# Patient Record
Sex: FEMALE | Race: WHITE | NOT HISPANIC OR LATINO | URBAN - METROPOLITAN AREA
[De-identification: names, ages, dates, MRNs, and addresses within clinical notes are randomized per-mention and may not be internally consistent; named-entity substitution may affect disease eponyms.]

---

## 2017-01-06 ENCOUNTER — OUTPATIENT (OUTPATIENT)
Dept: OUTPATIENT SERVICES | Facility: HOSPITAL | Age: 77
LOS: 1 days | Discharge: HOME | End: 2017-01-06

## 2017-01-06 DIAGNOSIS — I25.10 ATHEROSCLEROTIC HEART DISEASE OF NATIVE CORONARY ARTERY WITHOUT ANGINA PECTORIS: ICD-10-CM

## 2017-01-06 DIAGNOSIS — E03.9 HYPOTHYROIDISM, UNSPECIFIED: ICD-10-CM

## 2017-01-06 DIAGNOSIS — Z79.84 LONG TERM (CURRENT) USE OF ORAL HYPOGLYCEMIC DRUGS: ICD-10-CM

## 2017-01-06 DIAGNOSIS — E11.9 TYPE 2 DIABETES MELLITUS WITHOUT COMPLICATIONS: ICD-10-CM

## 2017-01-06 DIAGNOSIS — Z01.818 ENCOUNTER FOR OTHER PREPROCEDURAL EXAMINATION: ICD-10-CM

## 2017-01-13 ENCOUNTER — OUTPATIENT (OUTPATIENT)
Dept: OUTPATIENT SERVICES | Facility: HOSPITAL | Age: 77
LOS: 1 days | Discharge: HOME | End: 2017-01-13

## 2017-01-21 DIAGNOSIS — I25.10 ATHEROSCLEROTIC HEART DISEASE OF NATIVE CORONARY ARTERY WITHOUT ANGINA PECTORIS: ICD-10-CM

## 2017-01-21 DIAGNOSIS — Z79.84 LONG TERM (CURRENT) USE OF ORAL HYPOGLYCEMIC DRUGS: ICD-10-CM

## 2017-01-21 DIAGNOSIS — E11.9 TYPE 2 DIABETES MELLITUS WITHOUT COMPLICATIONS: ICD-10-CM

## 2017-01-21 DIAGNOSIS — R94.39 ABNORMAL RESULT OF OTHER CARDIOVASCULAR FUNCTION STUDY: ICD-10-CM

## 2017-01-21 DIAGNOSIS — E03.9 HYPOTHYROIDISM, UNSPECIFIED: ICD-10-CM

## 2017-07-03 ENCOUNTER — OUTPATIENT (OUTPATIENT)
Dept: OUTPATIENT SERVICES | Facility: HOSPITAL | Age: 77
LOS: 1 days | Discharge: HOME | End: 2017-07-03

## 2017-07-03 VITALS
DIASTOLIC BLOOD PRESSURE: 77 MMHG | HEIGHT: 62 IN | TEMPERATURE: 97 F | RESPIRATION RATE: 18 BRPM | OXYGEN SATURATION: 100 % | WEIGHT: 147.27 LBS | SYSTOLIC BLOOD PRESSURE: 137 MMHG | HEART RATE: 73 BPM

## 2017-07-03 DIAGNOSIS — Z98.890 OTHER SPECIFIED POSTPROCEDURAL STATES: Chronic | ICD-10-CM

## 2017-07-03 DIAGNOSIS — M48.06 SPINAL STENOSIS, LUMBAR REGION: ICD-10-CM

## 2017-07-03 DIAGNOSIS — M48.05 SPINAL STENOSIS, THORACOLUMBAR REGION: ICD-10-CM

## 2017-07-03 DIAGNOSIS — M40.15 OTHER SECONDARY KYPHOSIS, THORACOLUMBAR REGION: ICD-10-CM

## 2017-07-03 DIAGNOSIS — M43.16 SPONDYLOLISTHESIS, LUMBAR REGION: ICD-10-CM

## 2017-07-03 DIAGNOSIS — E03.9 HYPOTHYROIDISM, UNSPECIFIED: ICD-10-CM

## 2017-07-03 DIAGNOSIS — Z95.828 PRESENCE OF OTHER VASCULAR IMPLANTS AND GRAFTS: Chronic | ICD-10-CM

## 2017-07-03 DIAGNOSIS — E87.1 HYPO-OSMOLALITY AND HYPONATREMIA: ICD-10-CM

## 2017-07-03 DIAGNOSIS — Z96.659 PRESENCE OF UNSPECIFIED ARTIFICIAL KNEE JOINT: Chronic | ICD-10-CM

## 2017-07-03 DIAGNOSIS — M96.3 POSTLAMINECTOMY KYPHOSIS: ICD-10-CM

## 2017-07-03 DIAGNOSIS — D64.9 ANEMIA, UNSPECIFIED: ICD-10-CM

## 2017-07-03 DIAGNOSIS — Z01.818 ENCOUNTER FOR OTHER PREPROCEDURAL EXAMINATION: ICD-10-CM

## 2017-07-03 DIAGNOSIS — B96.5 PSEUDOMONAS (AERUGINOSA) (MALLEI) (PSEUDOMALLEI) AS THE CAUSE OF DISEASES CLASSIFIED ELSEWHERE: ICD-10-CM

## 2017-07-03 DIAGNOSIS — E11.9 TYPE 2 DIABETES MELLITUS WITHOUT COMPLICATIONS: ICD-10-CM

## 2017-07-03 LAB
ANION GAP SERPL CALC-SCNC: 7 MMOL/L — SIGNIFICANT CHANGE UP (ref 5–17)
APPEARANCE UR: CLEAR — SIGNIFICANT CHANGE UP
APTT BLD: 33 SEC — SIGNIFICANT CHANGE UP (ref 27.5–37.4)
BACTERIA # UR AUTO: (no result)
BASOPHILS # BLD AUTO: 0.1 K/UL — SIGNIFICANT CHANGE UP (ref 0–0.2)
BASOPHILS NFR BLD AUTO: 0.8 % — SIGNIFICANT CHANGE UP (ref 0–2)
BILIRUB UR-MCNC: NEGATIVE — SIGNIFICANT CHANGE UP
BLD GP AB SCN SERPL QL: SIGNIFICANT CHANGE UP
BUN SERPL-MCNC: 28 MG/DL — HIGH (ref 7–23)
CALCIUM SERPL-MCNC: 8.8 MG/DL — SIGNIFICANT CHANGE UP (ref 8.5–10.1)
CHLORIDE SERPL-SCNC: 106 MMOL/L — SIGNIFICANT CHANGE UP (ref 96–108)
CO2 SERPL-SCNC: 27 MMOL/L — SIGNIFICANT CHANGE UP (ref 22–31)
COLOR SPEC: YELLOW — SIGNIFICANT CHANGE UP
CREAT SERPL-MCNC: 0.94 MG/DL — SIGNIFICANT CHANGE UP (ref 0.5–1.3)
DIFF PNL FLD: NEGATIVE — SIGNIFICANT CHANGE UP
EOSINOPHIL # BLD AUTO: 0.1 K/UL — SIGNIFICANT CHANGE UP (ref 0–0.5)
EOSINOPHIL NFR BLD AUTO: 0.8 % — SIGNIFICANT CHANGE UP (ref 0–6)
EPI CELLS # UR: SIGNIFICANT CHANGE UP
GLUCOSE SERPL-MCNC: 107 MG/DL — HIGH (ref 70–99)
GLUCOSE UR QL: NEGATIVE MG/DL — SIGNIFICANT CHANGE UP
HCT VFR BLD CALC: 47.3 % — HIGH (ref 34.5–45)
HGB BLD-MCNC: 15.2 G/DL — SIGNIFICANT CHANGE UP (ref 11.5–15.5)
INR BLD: 1.02 RATIO — SIGNIFICANT CHANGE UP (ref 0.88–1.16)
KETONES UR-MCNC: NEGATIVE — SIGNIFICANT CHANGE UP
LEUKOCYTE ESTERASE UR-ACNC: NEGATIVE — SIGNIFICANT CHANGE UP
LYMPHOCYTES # BLD AUTO: 1.1 K/UL — SIGNIFICANT CHANGE UP (ref 1–3.3)
LYMPHOCYTES # BLD AUTO: 14.4 % — SIGNIFICANT CHANGE UP (ref 13–44)
MCHC RBC-ENTMCNC: 31.1 PG — SIGNIFICANT CHANGE UP (ref 27–34)
MCHC RBC-ENTMCNC: 32.2 GM/DL — SIGNIFICANT CHANGE UP (ref 32–36)
MCV RBC AUTO: 96.6 FL — SIGNIFICANT CHANGE UP (ref 80–100)
MONOCYTES # BLD AUTO: 0.2 K/UL — SIGNIFICANT CHANGE UP (ref 0–0.9)
MONOCYTES NFR BLD AUTO: 2.4 % — SIGNIFICANT CHANGE UP (ref 2–14)
MRSA PCR RESULT.: SIGNIFICANT CHANGE UP
NEUTROPHILS # BLD AUTO: 6 K/UL — SIGNIFICANT CHANGE UP (ref 1.8–7.4)
NEUTROPHILS NFR BLD AUTO: 81.6 % — HIGH (ref 43–77)
NITRITE UR-MCNC: NEGATIVE — SIGNIFICANT CHANGE UP
PH UR: 7 — SIGNIFICANT CHANGE UP (ref 5–8)
PLATELET # BLD AUTO: 414 K/UL — HIGH (ref 150–400)
POTASSIUM SERPL-MCNC: 4.4 MMOL/L — SIGNIFICANT CHANGE UP (ref 3.5–5.3)
POTASSIUM SERPL-SCNC: 4.4 MMOL/L — SIGNIFICANT CHANGE UP (ref 3.5–5.3)
PROT UR-MCNC: 15 MG/DL
PROTHROM AB SERPL-ACNC: 11 SEC — SIGNIFICANT CHANGE UP (ref 9.8–12.7)
RBC # BLD: 4.89 M/UL — SIGNIFICANT CHANGE UP (ref 3.8–5.2)
RBC # FLD: 12.8 % — SIGNIFICANT CHANGE UP (ref 10.3–14.5)
RBC CASTS # UR COMP ASSIST: SIGNIFICANT CHANGE UP /HPF (ref 0–4)
S AUREUS DNA NOSE QL NAA+PROBE: SIGNIFICANT CHANGE UP
SODIUM SERPL-SCNC: 140 MMOL/L — SIGNIFICANT CHANGE UP (ref 135–145)
SP GR SPEC: 1.01 — SIGNIFICANT CHANGE UP (ref 1.01–1.02)
TYPE + AB SCN PNL BLD: SIGNIFICANT CHANGE UP
UROBILINOGEN FLD QL: NEGATIVE MG/DL — SIGNIFICANT CHANGE UP
WBC # BLD: 7.3 K/UL — SIGNIFICANT CHANGE UP (ref 3.8–10.5)
WBC # FLD AUTO: 7.3 K/UL — SIGNIFICANT CHANGE UP (ref 3.8–10.5)
WBC UR QL: SIGNIFICANT CHANGE UP

## 2017-07-03 NOTE — H&P PST ADULT - HISTORY OF PRESENT ILLNESS
This is a 78 y/o female with PMH of hypothyroidism, diabetes, constipation, chronic lower back pain who is scheduled for a revision of laminectomy. She denies any injury ot her back reports pain is all due to arthritis. Pain at its worst is 10/10 and she takes Tylenol and prednisone relief. She also uses a TEMS machine. This is a 78 y/o female with PMH of hypothyroidism, diabetes, constipation, chronic lower back pain who is scheduled for a revision of laminectomy. She denies any injury ot her back and reports pain is all due to arthritis. Pain at its worst is 10/10 and she takes Tylenol and Prednisone for relief. She also uses a TEMS machine for pain relief and a rolling walker for ambulation This is a 78 y/o female with PMH of hypothyroidism, diabetes, constipation, chronic lower back pain who is scheduled for a revision of laminectomy. She denies any injury to her back and reports pain is all due to arthritis. Pain at its worst is 10/10 and she takes Tylenol and Prednisone for relief. She also uses a TEMS machine for pain relief and a rolling walker for ambulation

## 2017-07-03 NOTE — H&P PST ADULT - ASSESSMENT
This is a 76 y/o female who is scheduled for a laminectomy due to chronic back pain      Patient instructed on     1. NPO post midnight of surgery  2. On the use of EZ sponges  3. Mupirocin use  4. Aware that she needs medical clearance  5. May take synthroid with a sip of water on day of procedure

## 2017-07-03 NOTE — H&P PST ADULT - PMH
Cataract    Chronic Back Pain    Constipation    Edema    Hypothyroidism    Lumbar back pain    Osteoarthritis    Spastic colon Cataract    Chronic Back Pain    Constipation    Edema    Hypothyroidism    Lumbar back pain    MRSA colonization    Osteoarthritis    Spastic colon Cataract    Chronic Back Pain    Constipation    Diabetes    Edema    Hypothyroidism    Lumbar back pain    MRSA colonization    Osteoarthritis    Spastic colon

## 2017-07-11 ENCOUNTER — INPATIENT (INPATIENT)
Facility: HOSPITAL | Age: 77
LOS: 4 days | Discharge: HOME | End: 2017-07-16
Attending: ORTHOPAEDIC SURGERY | Admitting: ORTHOPAEDIC SURGERY
Payer: MEDICARE

## 2017-07-11 VITALS
RESPIRATION RATE: 16 BRPM | DIASTOLIC BLOOD PRESSURE: 87 MMHG | OXYGEN SATURATION: 100 % | HEIGHT: 62 IN | WEIGHT: 140.21 LBS | HEART RATE: 75 BPM | SYSTOLIC BLOOD PRESSURE: 145 MMHG | TEMPERATURE: 98 F

## 2017-07-11 DIAGNOSIS — E03.9 HYPOTHYROIDISM, UNSPECIFIED: ICD-10-CM

## 2017-07-11 DIAGNOSIS — Z98.890 OTHER SPECIFIED POSTPROCEDURAL STATES: Chronic | ICD-10-CM

## 2017-07-11 DIAGNOSIS — E11.9 TYPE 2 DIABETES MELLITUS WITHOUT COMPLICATIONS: ICD-10-CM

## 2017-07-11 DIAGNOSIS — Z95.828 PRESENCE OF OTHER VASCULAR IMPLANTS AND GRAFTS: Chronic | ICD-10-CM

## 2017-07-11 DIAGNOSIS — Z98.890 OTHER SPECIFIED POSTPROCEDURAL STATES: ICD-10-CM

## 2017-07-11 DIAGNOSIS — Z96.659 PRESENCE OF UNSPECIFIED ARTIFICIAL KNEE JOINT: Chronic | ICD-10-CM

## 2017-07-11 DIAGNOSIS — D72.829 ELEVATED WHITE BLOOD CELL COUNT, UNSPECIFIED: ICD-10-CM

## 2017-07-11 DIAGNOSIS — Z22.39 CARRIER OF OTHER SPECIFIED BACTERIAL DISEASES: ICD-10-CM

## 2017-07-11 LAB
ANION GAP SERPL CALC-SCNC: 8 MMOL/L — SIGNIFICANT CHANGE UP (ref 5–17)
BASOPHILS # BLD AUTO: 0 K/UL — SIGNIFICANT CHANGE UP (ref 0–0.2)
BASOPHILS NFR BLD AUTO: 0.3 % — SIGNIFICANT CHANGE UP (ref 0–2)
BUN SERPL-MCNC: 27 MG/DL — HIGH (ref 7–23)
CALCIUM SERPL-MCNC: 8.2 MG/DL — LOW (ref 8.5–10.1)
CHLORIDE SERPL-SCNC: 103 MMOL/L — SIGNIFICANT CHANGE UP (ref 96–108)
CO2 SERPL-SCNC: 28 MMOL/L — SIGNIFICANT CHANGE UP (ref 22–31)
CREAT SERPL-MCNC: 0.66 MG/DL — SIGNIFICANT CHANGE UP (ref 0.5–1.3)
EOSINOPHIL # BLD AUTO: 0 K/UL — SIGNIFICANT CHANGE UP (ref 0–0.5)
EOSINOPHIL NFR BLD AUTO: 0.1 % — SIGNIFICANT CHANGE UP (ref 0–6)
GLUCOSE SERPL-MCNC: 155 MG/DL — HIGH (ref 70–99)
HCT VFR BLD CALC: 37.6 % — SIGNIFICANT CHANGE UP (ref 34.5–45)
HGB BLD-MCNC: 12.5 G/DL — SIGNIFICANT CHANGE UP (ref 11.5–15.5)
LYMPHOCYTES # BLD AUTO: 1.1 K/UL — SIGNIFICANT CHANGE UP (ref 1–3.3)
LYMPHOCYTES # BLD AUTO: 6.5 % — LOW (ref 13–44)
MCHC RBC-ENTMCNC: 31.5 PG — SIGNIFICANT CHANGE UP (ref 27–34)
MCHC RBC-ENTMCNC: 33.4 GM/DL — SIGNIFICANT CHANGE UP (ref 32–36)
MCV RBC AUTO: 94.6 FL — SIGNIFICANT CHANGE UP (ref 80–100)
MONOCYTES # BLD AUTO: 0.9 K/UL — SIGNIFICANT CHANGE UP (ref 0–0.9)
MONOCYTES NFR BLD AUTO: 5.3 % — SIGNIFICANT CHANGE UP (ref 2–14)
NEUTROPHILS # BLD AUTO: 14.4 K/UL — HIGH (ref 1.8–7.4)
NEUTROPHILS NFR BLD AUTO: 87.8 % — HIGH (ref 43–77)
PLATELET # BLD AUTO: 346 K/UL — SIGNIFICANT CHANGE UP (ref 150–400)
POTASSIUM SERPL-MCNC: 3.3 MMOL/L — LOW (ref 3.5–5.3)
POTASSIUM SERPL-SCNC: 3.3 MMOL/L — LOW (ref 3.5–5.3)
RBC # BLD: 3.98 M/UL — SIGNIFICANT CHANGE UP (ref 3.8–5.2)
RBC # FLD: 12.4 % — SIGNIFICANT CHANGE UP (ref 10.3–14.5)
SODIUM SERPL-SCNC: 139 MMOL/L — SIGNIFICANT CHANGE UP (ref 135–145)
WBC # BLD: 16.4 K/UL — HIGH (ref 3.8–10.5)
WBC # FLD AUTO: 16.4 K/UL — HIGH (ref 3.8–10.5)

## 2017-07-11 RX ORDER — INSULIN LISPRO 100/ML
VIAL (ML) SUBCUTANEOUS
Qty: 0 | Refills: 0 | Status: DISCONTINUED | OUTPATIENT
Start: 2017-07-11 | End: 2017-07-16

## 2017-07-11 RX ORDER — MAGNESIUM HYDROXIDE 400 MG/1
30 TABLET, CHEWABLE ORAL EVERY 12 HOURS
Qty: 0 | Refills: 0 | Status: DISCONTINUED | OUTPATIENT
Start: 2017-07-11 | End: 2017-07-16

## 2017-07-11 RX ORDER — DOCUSATE SODIUM 100 MG
100 CAPSULE ORAL THREE TIMES A DAY
Qty: 0 | Refills: 0 | Status: DISCONTINUED | OUTPATIENT
Start: 2017-07-11 | End: 2017-07-16

## 2017-07-11 RX ORDER — CEFTAZIDIME 6 G/30ML
2 INJECTION, POWDER, FOR SOLUTION INTRAVENOUS ONCE
Qty: 0 | Refills: 0 | Status: COMPLETED | OUTPATIENT
Start: 2017-07-11 | End: 2017-07-11

## 2017-07-11 RX ORDER — DEXTROSE 50 % IN WATER 50 %
1 SYRINGE (ML) INTRAVENOUS ONCE
Qty: 0 | Refills: 0 | Status: DISCONTINUED | OUTPATIENT
Start: 2017-07-11 | End: 2017-07-16

## 2017-07-11 RX ORDER — DEXTROSE 50 % IN WATER 50 %
25 SYRINGE (ML) INTRAVENOUS ONCE
Qty: 0 | Refills: 0 | Status: DISCONTINUED | OUTPATIENT
Start: 2017-07-11 | End: 2017-07-16

## 2017-07-11 RX ORDER — NALOXONE HYDROCHLORIDE 4 MG/.1ML
0.1 SPRAY NASAL
Qty: 0 | Refills: 0 | Status: DISCONTINUED | OUTPATIENT
Start: 2017-07-11 | End: 2017-07-16

## 2017-07-11 RX ORDER — DEXTROSE 50 % IN WATER 50 %
12.5 SYRINGE (ML) INTRAVENOUS ONCE
Qty: 0 | Refills: 0 | Status: DISCONTINUED | OUTPATIENT
Start: 2017-07-11 | End: 2017-07-16

## 2017-07-11 RX ORDER — CEFEPIME 1 G/1
2000 INJECTION, POWDER, FOR SOLUTION INTRAMUSCULAR; INTRAVENOUS ONCE
Qty: 0 | Refills: 0 | Status: DISCONTINUED | OUTPATIENT
Start: 2017-07-11 | End: 2017-07-11

## 2017-07-11 RX ORDER — CEFEPIME 1 G/1
2000 INJECTION, POWDER, FOR SOLUTION INTRAMUSCULAR; INTRAVENOUS EVERY 8 HOURS
Qty: 0 | Refills: 0 | Status: DISCONTINUED | OUTPATIENT
Start: 2017-07-11 | End: 2017-07-11

## 2017-07-11 RX ORDER — CEFEPIME 1 G/1
2000 INJECTION, POWDER, FOR SOLUTION INTRAMUSCULAR; INTRAVENOUS EVERY 12 HOURS
Qty: 0 | Refills: 0 | Status: COMPLETED | OUTPATIENT
Start: 2017-07-11 | End: 2017-07-12

## 2017-07-11 RX ORDER — SENNA PLUS 8.6 MG/1
2 TABLET ORAL AT BEDTIME
Qty: 0 | Refills: 0 | Status: DISCONTINUED | OUTPATIENT
Start: 2017-07-11 | End: 2017-07-16

## 2017-07-11 RX ORDER — SODIUM CHLORIDE 9 MG/ML
1000 INJECTION, SOLUTION INTRAVENOUS
Qty: 0 | Refills: 0 | Status: DISCONTINUED | OUTPATIENT
Start: 2017-07-11 | End: 2017-07-11

## 2017-07-11 RX ORDER — SODIUM CHLORIDE 9 MG/ML
1000 INJECTION, SOLUTION INTRAVENOUS
Qty: 0 | Refills: 0 | Status: DISCONTINUED | OUTPATIENT
Start: 2017-07-11 | End: 2017-07-16

## 2017-07-11 RX ORDER — ONDANSETRON 8 MG/1
4 TABLET, FILM COATED ORAL EVERY 6 HOURS
Qty: 0 | Refills: 0 | Status: DISCONTINUED | OUTPATIENT
Start: 2017-07-11 | End: 2017-07-16

## 2017-07-11 RX ORDER — ACETAMINOPHEN 500 MG
650 TABLET ORAL EVERY 6 HOURS
Qty: 0 | Refills: 0 | Status: DISCONTINUED | OUTPATIENT
Start: 2017-07-11 | End: 2017-07-16

## 2017-07-11 RX ORDER — HYDROMORPHONE HYDROCHLORIDE 2 MG/ML
30 INJECTION INTRAMUSCULAR; INTRAVENOUS; SUBCUTANEOUS
Qty: 0 | Refills: 0 | Status: DISCONTINUED | OUTPATIENT
Start: 2017-07-11 | End: 2017-07-14

## 2017-07-11 RX ORDER — HYDROMORPHONE HYDROCHLORIDE 2 MG/ML
0.5 INJECTION INTRAMUSCULAR; INTRAVENOUS; SUBCUTANEOUS
Qty: 0 | Refills: 0 | Status: DISCONTINUED | OUTPATIENT
Start: 2017-07-11 | End: 2017-07-14

## 2017-07-11 RX ORDER — GLUCAGON INJECTION, SOLUTION 0.5 MG/.1ML
1 INJECTION, SOLUTION SUBCUTANEOUS ONCE
Qty: 0 | Refills: 0 | Status: DISCONTINUED | OUTPATIENT
Start: 2017-07-11 | End: 2017-07-16

## 2017-07-11 RX ORDER — LEVOTHYROXINE SODIUM 125 MCG
100 TABLET ORAL DAILY
Qty: 0 | Refills: 0 | Status: DISCONTINUED | OUTPATIENT
Start: 2017-07-11 | End: 2017-07-16

## 2017-07-11 RX ORDER — DEXTROSE MONOHYDRATE, SODIUM CHLORIDE, AND POTASSIUM CHLORIDE 50; .745; 4.5 G/1000ML; G/1000ML; G/1000ML
1000 INJECTION, SOLUTION INTRAVENOUS
Qty: 0 | Refills: 0 | Status: DISCONTINUED | OUTPATIENT
Start: 2017-07-11 | End: 2017-07-16

## 2017-07-11 RX ORDER — CEFAZOLIN SODIUM 1 G
2000 VIAL (EA) INJECTION ONCE
Qty: 0 | Refills: 0 | Status: DISCONTINUED | OUTPATIENT
Start: 2017-07-11 | End: 2017-07-11

## 2017-07-11 RX ADMIN — HYDROMORPHONE HYDROCHLORIDE 30 MILLILITER(S): 2 INJECTION INTRAMUSCULAR; INTRAVENOUS; SUBCUTANEOUS at 15:50

## 2017-07-11 RX ADMIN — SODIUM CHLORIDE 75 MILLILITER(S): 9 INJECTION, SOLUTION INTRAVENOUS at 17:20

## 2017-07-11 RX ADMIN — DEXTROSE MONOHYDRATE, SODIUM CHLORIDE, AND POTASSIUM CHLORIDE 120 MILLILITER(S): 50; .745; 4.5 INJECTION, SOLUTION INTRAVENOUS at 18:45

## 2017-07-11 RX ADMIN — ONDANSETRON 4 MILLIGRAM(S): 8 TABLET, FILM COATED ORAL at 16:26

## 2017-07-11 RX ADMIN — CEFTAZIDIME 100 GRAM(S): 6 INJECTION, POWDER, FOR SOLUTION INTRAVENOUS at 09:08

## 2017-07-11 RX ADMIN — CEFEPIME 100 MILLIGRAM(S): 1 INJECTION, POWDER, FOR SOLUTION INTRAMUSCULAR; INTRAVENOUS at 18:06

## 2017-07-11 NOTE — PATIENT PROFILE ADULT. - PMH
Cataract    Chronic Back Pain    Constipation    Diabetes    Edema    Hypothyroidism    Lumbar back pain    MRSA colonization    Osteoarthritis    Spastic colon

## 2017-07-11 NOTE — BRIEF OPERATIVE NOTE - PROCEDURE
Thoracic laminectomy with fusion at 3 or more levels  07/11/2017  T11-L1 laminectomy, L4-5 osteotomy, T10 to S1, S2 iliac fusion/ instrumentation  Active  LMERMEL

## 2017-07-11 NOTE — CONSULT NOTE ADULT - SUBJECTIVE AND OBJECTIVE BOX
HPI: 76 y/o female with hx of pseudomonas colonization, hypothyroidism, Diabetes, OA, previous lumbar spine surgeries s/p lumbar lami/ fusion revision.  Medical consult requested for post op medical management.  17: Pt is currently in PACU and easily arousable - states she is nauseated. Denies any cp, sob, She c/o back pain - able to move her toes and lower extremities.  Reports some back spasms      PAST MEDICAL & SURGICAL HISTORY:  Diabetes  MRSA colonization  Osteoarthritis  Constipation  Lumbar back pain  Spastic colon  Cataract  Edema  Hypothyroidism  Chronic Back Pain  [place] filter in place:   H/O sinus surgery: 2017  H/O Spinal surgery:   S/P TKR (total knee replacement): Left   S/P TKR (Total Knee Replacement): Right   S/P Appendectomy:   S/P Cholecystectomy:   S/P Hysterectomy:       FAMILY HISTORY:     No pertinent family history in first degree relatives      SOCIAL HISTORY:  no smoking, no alcohol, no drugs    REVIEW OF SYSTEMS:   All 10 systems reviewed in detailed and found to be negative with the exception of what has already been described above    MEDICATIONS  (STANDING):  HYDROmorphone PCA (1 mG/mL) 30 milliLiter(s) PCA Continuous PCA Continuous  lactated ringers. 1000 milliLiter(s) (75 mL/Hr) IV Continuous <Continuous>  cefTAZidime  IVPB 2 Gram(s) IV Intermittent once  cefepime  IVPB 2000 milliGRAM(s) IV Intermittent every 12 hours    MEDICATIONS  (PRN):  HYDROmorphone PCA (1 mG/mL) Rescue Clinician Bolus 0.5 milliGRAM(s) IV Push every 15 minutes PRN for Pain Scale GREATER THAN 6  naloxone Injectable 0.1 milliGRAM(s) IV Push every 3 minutes PRN For ANY of the following changes in patient status:  A. RR LESS THAN 10 breaths per minute, B. Oxygen saturation LESS THAN 90%, C. Sedation score of 6  ondansetron Injectable 4 milliGRAM(s) IV Push every 6 hours PRN Nausea      Allergies    Advil (Unknown)  allergic to food coloring (Unknown)  Ceclor (Other)  cephalosporins (Other)  ciprofloxacin (Other)  Compazine (Other)  food coloring red/blue, wheat, tide, morphine, ceclor, compazine, advil, mortim, iv contrast, shellfish, oregano, tomoatoes, pork, peas, mold/spores (Unknown)  morphine (Unknown)  Motrin (Unknown)  Originally Entered as [Rash] reaction to [oregano spice] (Unknown)  peanuts (Other)  pork (Unknown)  shellfish (Unknown)    Intolerances          PHYSICAL EXAM:    Vital Signs Last 24 Hrs  T(C): 37.2 (2017 17:00), Max: 37.2 (2017 17:00)  T(F): 99 (2017 17:00), Max: 99 (2017 17:00)  HR: 77 (2017 18:00) (72 - 77)  BP: 115/62 (2017 18:00) (114/50 - 145/87)  BP(mean): --  RR: 12 (2017 18:00) (12 - 16)  SpO2: 99% (2017 18:00) (97% - 100%)    GEN: EASILY AROUSABLE, NAD, mood stable  HEENT:   NC/AT, EOMI, no oropharyngeal lesions, erythema, exudates, oral thrush    NECK:   supple,  no palpable lymph nodes, no thyromegaly    CV:  +S1, +S2, regular, no murmurs or rubs    RESP:   lungs clear to auscultation bilaterally, no wheezing, rales, rhonchi, good air entry bilaterally    GI:  abdomen soft, non-tender, non-distended, normal BS, no bruits, no abdominal masses, no palpable masses    BACK: DRESSING NOT EXAMINED - BUT HEMOVAC IN PLACE    RECTAL:  not examined    :  POS HOOKS    MSK:   normal muscle tone, no atrophy, no rigidity, no contractions    EXT:   no clubbing, no cyanosis, no edema, no calf pain, swelling or erythema    VASCULAR:  pulses equal and symmetric in the upper and lower extremities    NEURO:  AAOX3, no focal neurological deficits, follows all commands, able to move extremities spontaneously    SKIN:  no ulcers, lesions or rashes    LABS/IMAGIN.5   16.4  )-----------( 346      ( 2017 15:45 )             37.6     07-11    139  |  103  |  27<H>  ----------------------------<  155<H>  3.3<L>   |  28  |  0.66    Ca    8.2<L>      2017 15:45          EKG: NSR@80BPM

## 2017-07-11 NOTE — BRIEF OPERATIVE NOTE - POST-OP DX
Post laminectomy syndrome  07/11/2017  With sagittal imbalance, kyphosis, adjacent level stenosis T11-L1  Active  Radha Barnett

## 2017-07-11 NOTE — PATIENT PROFILE ADULT. - PSH
[place] filter in place  2009  H/O sinus surgery  2/2017  H/O Spinal surgery  2009  S/P Appendectomy  1968  S/P Cholecystectomy  1968  S/P Hysterectomy  1987  S/P TKR (total knee replacement)  Left 2009  S/P TKR (Total Knee Replacement)  Right 2015

## 2017-07-11 NOTE — CONSULT NOTE ADULT - PROBLEM SELECTOR RECOMMENDATION 9
POD# 0  PAIN COTNROL WITH PCA  CLOSE MONITORING - TO GO TO SDU  INCENTIVE SPIROMETRY ONCE MORE AWAKE  DVT PROPHY - VENODYNES ONLY IN THE SETTING OF RECENT SPIEN SURGERY  MONITOR HEMOVAC OUTPUT

## 2017-07-12 DIAGNOSIS — D50.0 IRON DEFICIENCY ANEMIA SECONDARY TO BLOOD LOSS (CHRONIC): ICD-10-CM

## 2017-07-12 DIAGNOSIS — M25.551 PAIN IN RIGHT HIP: ICD-10-CM

## 2017-07-12 LAB
ANION GAP SERPL CALC-SCNC: 4 MMOL/L — LOW (ref 5–17)
BASOPHILS # BLD AUTO: 0.1 K/UL — SIGNIFICANT CHANGE UP (ref 0–0.2)
BASOPHILS NFR BLD AUTO: 0.5 % — SIGNIFICANT CHANGE UP (ref 0–2)
BUN SERPL-MCNC: 18 MG/DL — SIGNIFICANT CHANGE UP (ref 7–23)
CALCIUM SERPL-MCNC: 8 MG/DL — LOW (ref 8.5–10.1)
CHLORIDE SERPL-SCNC: 103 MMOL/L — SIGNIFICANT CHANGE UP (ref 96–108)
CO2 SERPL-SCNC: 30 MMOL/L — SIGNIFICANT CHANGE UP (ref 22–31)
CREAT SERPL-MCNC: 0.75 MG/DL — SIGNIFICANT CHANGE UP (ref 0.5–1.3)
EOSINOPHIL # BLD AUTO: 0 K/UL — SIGNIFICANT CHANGE UP (ref 0–0.5)
EOSINOPHIL NFR BLD AUTO: 0.1 % — SIGNIFICANT CHANGE UP (ref 0–6)
GLUCOSE SERPL-MCNC: 168 MG/DL — HIGH (ref 70–99)
HBA1C BLD-MCNC: 5.5 % — SIGNIFICANT CHANGE UP (ref 4–5.6)
HCT VFR BLD CALC: 35.1 % — SIGNIFICANT CHANGE UP (ref 34.5–45)
HGB BLD-MCNC: 11.2 G/DL — LOW (ref 11.5–15.5)
LYMPHOCYTES # BLD AUTO: 1.2 K/UL — SIGNIFICANT CHANGE UP (ref 1–3.3)
LYMPHOCYTES # BLD AUTO: 9.9 % — LOW (ref 13–44)
MCHC RBC-ENTMCNC: 31.2 PG — SIGNIFICANT CHANGE UP (ref 27–34)
MCHC RBC-ENTMCNC: 31.9 GM/DL — LOW (ref 32–36)
MCV RBC AUTO: 97.7 FL — SIGNIFICANT CHANGE UP (ref 80–100)
MONOCYTES # BLD AUTO: 1.2 K/UL — HIGH (ref 0–0.9)
MONOCYTES NFR BLD AUTO: 10 % — SIGNIFICANT CHANGE UP (ref 2–14)
NEUTROPHILS # BLD AUTO: 9.8 K/UL — HIGH (ref 1.8–7.4)
NEUTROPHILS NFR BLD AUTO: 79.4 % — HIGH (ref 43–77)
PLATELET # BLD AUTO: 267 K/UL — SIGNIFICANT CHANGE UP (ref 150–400)
POTASSIUM SERPL-MCNC: 4 MMOL/L — SIGNIFICANT CHANGE UP (ref 3.5–5.3)
POTASSIUM SERPL-SCNC: 4 MMOL/L — SIGNIFICANT CHANGE UP (ref 3.5–5.3)
RBC # BLD: 3.59 M/UL — LOW (ref 3.8–5.2)
RBC # FLD: 12.6 % — SIGNIFICANT CHANGE UP (ref 10.3–14.5)
SODIUM SERPL-SCNC: 137 MMOL/L — SIGNIFICANT CHANGE UP (ref 135–145)
WBC # BLD: 12.4 K/UL — HIGH (ref 3.8–10.5)
WBC # FLD AUTO: 12.4 K/UL — HIGH (ref 3.8–10.5)

## 2017-07-12 PROCEDURE — 93970 EXTREMITY STUDY: CPT | Mod: 26

## 2017-07-12 RX ORDER — ONDANSETRON 8 MG/1
4 TABLET, FILM COATED ORAL ONCE
Qty: 0 | Refills: 0 | Status: COMPLETED | OUTPATIENT
Start: 2017-07-12 | End: 2017-07-12

## 2017-07-12 RX ADMIN — ONDANSETRON 4 MILLIGRAM(S): 8 TABLET, FILM COATED ORAL at 00:13

## 2017-07-12 RX ADMIN — DEXTROSE MONOHYDRATE, SODIUM CHLORIDE, AND POTASSIUM CHLORIDE 120 MILLILITER(S): 50; .745; 4.5 INJECTION, SOLUTION INTRAVENOUS at 05:44

## 2017-07-12 RX ADMIN — DEXTROSE MONOHYDRATE, SODIUM CHLORIDE, AND POTASSIUM CHLORIDE 120 MILLILITER(S): 50; .745; 4.5 INJECTION, SOLUTION INTRAVENOUS at 11:37

## 2017-07-12 RX ADMIN — Medication 20 MILLIGRAM(S): at 05:45

## 2017-07-12 RX ADMIN — ONDANSETRON 4 MILLIGRAM(S): 8 TABLET, FILM COATED ORAL at 05:44

## 2017-07-12 RX ADMIN — Medication 100 MICROGRAM(S): at 05:46

## 2017-07-12 RX ADMIN — CEFEPIME 100 MILLIGRAM(S): 1 INJECTION, POWDER, FOR SOLUTION INTRAMUSCULAR; INTRAVENOUS at 05:44

## 2017-07-12 NOTE — PHYSICAL THERAPY INITIAL EVALUATION ADULT - ADDITIONAL COMMENTS
Pt. resides in house w/  and son in one level house w/ CEASAR. Pt. has a hospital bed and wheelchair. Pt. drives.

## 2017-07-12 NOTE — PROGRESS NOTE ADULT - ASSESSMENT
A/P: s/p T11-L1 laminectomy, T10-S2 fusion - stable  1.  STAT b/l LE Dopplers to r/o DVT  2.  Continue PCA/rene  3.  PT/mobilization pending Doppler results and pending delivery of TLSO (confirmed to be delivered this am by [facility])

## 2017-07-12 NOTE — PHYSICAL THERAPY INITIAL EVALUATION ADULT - MANUAL MUSCLE TESTING RESULTS, REHAB EVAL
5/5 except L shoulder flexion 0/5 due to rotator cuff tear and RLE not tested due to pt. in pain. Dopplers (-) for DVT.

## 2017-07-12 NOTE — PROGRESS NOTE ADULT - SUBJECTIVE AND OBJECTIVE BOX
[facility]                                                           Orthopedic Spine Progress Note      POST OPERATIVE DAY # 1  STATUS POST: T11-L1 laminectomy, T10-S2 fusion    Pre-Op Dx: Post laminectomy syndrome    Post-Op Dx:  Post laminectomy syndrome    SUBJECTIVE: Patient seen and examined, AOx3, c/o new onset moderate to severe right groin pain.     Pain (0-10): 10  Current Pain Management:  [x] PCA   [ ] Po Analgesics [ ] IM /IV Anagesics     Vital Signs Last 24 Hrs  T(C): 36.4 (12 Jul 2017 09:16), Max: 37.2 (11 Jul 2017 17:00)  T(F): 97.6 (12 Jul 2017 09:16), Max: 99 (11 Jul 2017 17:00)  HR: 74 (12 Jul 2017 06:00) (70 - 78)  BP: 111/53 (12 Jul 2017 06:00) (98/80 - 121/57)  BP(mean): 69 (12 Jul 2017 06:00) (56 - 84)  RR: 13 (12 Jul 2017 06:00) (11 - 19)  SpO2: 98% (12 Jul 2017 06:00) (97% - 100%)  I&O's Detail    11 Jul 2017 07:01  -  12 Jul 2017 07:00  --------------------------------------------------------  IN:    dextrose 5% + sodium chloride 0.45% with potassium chloride 20 mEq/L: 1260 mL    IV PiggyBack: 50 mL    lactated ringers.: 226 mL    Other: 2500 mL  Total IN: 4036 mL    OUT:    Accordian: 390 mL    Indwelling Catheter - Urethral: 845 mL  Total OUT: 1235 mL    Total NET: 2801 mL          OBJECTIVE:         Wound /Dressing: dressing intact, drain 390cc - kept  Cervical ROM: full  Lumbar: ROM : not tested  Neurological: A/O x 3              Sensation: [x] intact to light touch  [ ] decreased:          Motor exam: [x]                 [x] Lower ext.     Hip Flx   Quad   Hamstrg   TA       EHL      GS                                 R        4+/5      4+/5       5/5        5/5      5/5        5/5                                        L         5/5        5/5        5/5        5/5      5/5        5/5                                                                [x] Vascular:  intact           Tension Signs: none          Long Tract Findings: none     RADIOLOGY & ADDITIONAL STUDIES:                                               LABS:                        11.2   12.4  )-----------( 267      ( 12 Jul 2017 06:16 )             35.1     07-12    137  |  103  |  18  ----------------------------<  168<H>  4.0   |  30  |  0.75    Ca    8.0<L>      12 Jul 2017 06:16          Blood Culture: n/a  Wound Culture: n/a

## 2017-07-12 NOTE — PROGRESS NOTE ADULT - SUBJECTIVE AND OBJECTIVE BOX
HPI: 78 y/o female with hx of pseudomonas colonization, hypothyroidism, Diabetes, OA, previous lumbar spine surgeries s/p lumbar lami/ fusion revision.  Medical consult requested for post op medical management.    17: Pt is currently in PACU and easily arousable - states she is nauseated. Denies any cp, sob, She c/o back pain - able to move her toes and lower extremities.  Reports some back spasms  17: Nausea resolved nowl c/o some right hip/pelvic pain when moves; no cp,s ob, n/v/f/c; back pain at site of surgery      REVIEW OF SYSTEMS:   All 10 systems reviewed in detailed and found to be negative with the exception of what has already been described above      PHYSICAL EXAM:    Vital Signs Last 24 Hrs  T(C): 36.4 (2017 09:16), Max: 37.2 (2017 17:00)  T(F): 97.6 (2017 09:16), Max: 99 (2017 17:00)  HR: 75 (2017 09:00) (70 - 78)  BP: 132/45 (2017 09:00) (98/80 - 132/45)  BP(mean): 68 (2017 09:00) (56 - 84)  RR: 16 (2017 09:00) (11 - 22)  SpO2: 99% (2017 09:00) (97% - 100%)    GEN: A AND O, NAD, mood stable  HEENT:   NC/AT, EOMI, no oropharyngeal lesions    NECK:   supple,  no palpable lymph nodes, no thyromegaly    CV:  +S1, +S2, regular, no murmurs or rubs    RESP:   lungs clear to auscultation bilaterally, no wheezing, rales, rhonchi, good air entry bilaterally    GI:  abdomen soft, non-tender, non-distended, normal BS,  no abdominal masses, no palpable masses    BACK: DRESSING C/D/I  - HEMOVAC IN PLACE    RECTAL:  not examined    :  POS HOOKS    MSK:   normal muscle tone, no atrophy, no rigidity, no contractions, PAIN WITH MOVEMENT OF RIGHT LE    EXT:   no clubbing, no cyanosis, no edema, no calf pain, swelling or erythema    VASCULAR:  pulses equal and symmetric in the upper and lower extremities    NEURO:  AAOX3, no focal neurological deficits, follows all commands, able to move extremities spontaneously    SKIN:  no ulcers, lesions or rashes    LABS/IMAGIN.2   12.4  )-----------( 267      ( 2017 06:16 )             35.1     07-12    137  |  103  |  18  ----------------------------<  168<H>  4.0   |  30  |  0.75    Ca    8.0<L>      2017 06:16                MEDICATIONS  (STANDING):  HYDROmorphone PCA (1 mG/mL) 30 milliLiter(s) PCA Continuous PCA Continuous  predniSONE   Tablet 20 milliGRAM(s) Oral daily  levothyroxine 100 MICROGram(s) Oral daily  dextrose 5% + sodium chloride 0.45% with potassium chloride 20 mEq/L 1000 milliLiter(s) (120 mL/Hr) IV Continuous <Continuous>  docusate sodium 100 milliGRAM(s) Oral three times a day  senna 2 Tablet(s) Oral at bedtime  insulin lispro (HumaLOG) corrective regimen sliding scale   SubCutaneous three times a day before meals  dextrose 5%. 1000 milliLiter(s) (50 mL/Hr) IV Continuous <Continuous>  dextrose 50% Injectable 12.5 Gram(s) IV Push once  dextrose 50% Injectable 25 Gram(s) IV Push once  dextrose 50% Injectable 25 Gram(s) IV Push once    MEDICATIONS  (PRN):  HYDROmorphone PCA (1 mG/mL) Rescue Clinician Bolus 0.5 milliGRAM(s) IV Push every 15 minutes PRN for Pain Scale GREATER THAN 6  naloxone Injectable 0.1 milliGRAM(s) IV Push every 3 minutes PRN For ANY of the following changes in patient status:  A. RR LESS THAN 10 breaths per minute, B. Oxygen saturation LESS THAN 90%, C. Sedation score of 6  ondansetron Injectable 4 milliGRAM(s) IV Push every 6 hours PRN Nausea  acetaminophen   Tablet 650 milliGRAM(s) Oral every 6 hours PRN For Temp greater than 38.5 C (101.3 F)  acetaminophen   Tablet. 650 milliGRAM(s) Oral every 6 hours PRN Mild Pain (1 - 3)  aluminum hydroxide/magnesium hydroxide/simethicone Suspension 30 milliLiter(s) Oral every 12 hours PRN Indigestion  magnesium hydroxide Suspension 30 milliLiter(s) Oral every 12 hours PRN Constipation  dextrose Gel 1 Dose(s) Oral once PRN Blood Glucose LESS THAN 70 milliGRAM(s)/deciliter  glucagon  Injectable 1 milliGRAM(s) IntraMuscular once PRN Glucose LESS THAN 70 milligrams/deciliter

## 2017-07-12 NOTE — PHYSICAL THERAPY INITIAL EVALUATION ADULT - ACTIVE RANGE OF MOTION EXAMINATION, REHAB EVAL
deficits as listed below/except L shoulder flexion and RLE knee and hip flexion due to pain/Left UE Active ROM was WFL (within functional limits)/Left LE Active ROM was WFL (within functional limits)/Right UE Active ROM was WFL (within functional limits)

## 2017-07-13 LAB
ANION GAP SERPL CALC-SCNC: 5 MMOL/L — SIGNIFICANT CHANGE UP (ref 5–17)
BASOPHILS # BLD AUTO: 0.1 K/UL — SIGNIFICANT CHANGE UP (ref 0–0.2)
BASOPHILS NFR BLD AUTO: 0.9 % — SIGNIFICANT CHANGE UP (ref 0–2)
BUN SERPL-MCNC: 8 MG/DL — SIGNIFICANT CHANGE UP (ref 7–23)
CALCIUM SERPL-MCNC: 8.2 MG/DL — LOW (ref 8.5–10.1)
CHLORIDE SERPL-SCNC: 99 MMOL/L — SIGNIFICANT CHANGE UP (ref 96–108)
CO2 SERPL-SCNC: 30 MMOL/L — SIGNIFICANT CHANGE UP (ref 22–31)
CREAT SERPL-MCNC: 0.51 MG/DL — SIGNIFICANT CHANGE UP (ref 0.5–1.3)
EOSINOPHIL # BLD AUTO: 0 K/UL — SIGNIFICANT CHANGE UP (ref 0–0.5)
EOSINOPHIL NFR BLD AUTO: 0.1 % — SIGNIFICANT CHANGE UP (ref 0–6)
GLUCOSE SERPL-MCNC: 132 MG/DL — HIGH (ref 70–99)
HCT VFR BLD CALC: 34 % — LOW (ref 34.5–45)
HGB BLD-MCNC: 10.7 G/DL — LOW (ref 11.5–15.5)
LYMPHOCYTES # BLD AUTO: 1.5 K/UL — SIGNIFICANT CHANGE UP (ref 1–3.3)
LYMPHOCYTES # BLD AUTO: 10.8 % — LOW (ref 13–44)
MCHC RBC-ENTMCNC: 30.9 PG — SIGNIFICANT CHANGE UP (ref 27–34)
MCHC RBC-ENTMCNC: 31.4 GM/DL — LOW (ref 32–36)
MCV RBC AUTO: 98.2 FL — SIGNIFICANT CHANGE UP (ref 80–100)
MONOCYTES # BLD AUTO: 1.6 K/UL — HIGH (ref 0–0.9)
MONOCYTES NFR BLD AUTO: 11.6 % — SIGNIFICANT CHANGE UP (ref 2–14)
NEUTROPHILS # BLD AUTO: 10.5 K/UL — HIGH (ref 1.8–7.4)
NEUTROPHILS NFR BLD AUTO: 76.7 % — SIGNIFICANT CHANGE UP (ref 43–77)
PLATELET # BLD AUTO: 251 K/UL — SIGNIFICANT CHANGE UP (ref 150–400)
POTASSIUM SERPL-MCNC: 4.1 MMOL/L — SIGNIFICANT CHANGE UP (ref 3.5–5.3)
POTASSIUM SERPL-SCNC: 4.1 MMOL/L — SIGNIFICANT CHANGE UP (ref 3.5–5.3)
RBC # BLD: 3.46 M/UL — LOW (ref 3.8–5.2)
RBC # FLD: 11.9 % — SIGNIFICANT CHANGE UP (ref 10.3–14.5)
SODIUM SERPL-SCNC: 134 MMOL/L — LOW (ref 135–145)
WBC # BLD: 13.7 K/UL — HIGH (ref 3.8–10.5)
WBC # FLD AUTO: 13.7 K/UL — HIGH (ref 3.8–10.5)

## 2017-07-13 PROCEDURE — 72128 CT CHEST SPINE W/O DYE: CPT | Mod: 26

## 2017-07-13 PROCEDURE — 72131 CT LUMBAR SPINE W/O DYE: CPT | Mod: 26

## 2017-07-13 RX ADMIN — SENNA PLUS 2 TABLET(S): 8.6 TABLET ORAL at 21:33

## 2017-07-13 RX ADMIN — Medication 100 MILLIGRAM(S): at 21:33

## 2017-07-13 RX ADMIN — Medication 100 MICROGRAM(S): at 05:02

## 2017-07-13 RX ADMIN — DEXTROSE MONOHYDRATE, SODIUM CHLORIDE, AND POTASSIUM CHLORIDE 120 MILLILITER(S): 50; .745; 4.5 INJECTION, SOLUTION INTRAVENOUS at 05:02

## 2017-07-13 RX ADMIN — Medication 1: at 08:06

## 2017-07-13 RX ADMIN — DEXTROSE MONOHYDRATE, SODIUM CHLORIDE, AND POTASSIUM CHLORIDE 120 MILLILITER(S): 50; .745; 4.5 INJECTION, SOLUTION INTRAVENOUS at 12:06

## 2017-07-13 RX ADMIN — Medication 650 MILLIGRAM(S): at 11:18

## 2017-07-13 RX ADMIN — Medication 1: at 12:57

## 2017-07-13 RX ADMIN — Medication 20 MILLIGRAM(S): at 05:02

## 2017-07-13 RX ADMIN — Medication 650 MILLIGRAM(S): at 08:07

## 2017-07-13 NOTE — PROGRESS NOTE ADULT - SUBJECTIVE AND OBJECTIVE BOX
HPI: 76 y/o female with hx of pseudomonas colonization, hypothyroidism, Diabetes, OA, previous lumbar spine surgeries s/p lumbar lami/ fusion revision.  Medical consult requested for post op medical management.    7/11/17: Pt is currently in PACU and easily arousable - states she is nauseated. Denies any cp, sob, She c/o back pain - able to move her toes and lower extremities.  Reports some back spasms  7/12/17: Nausea resolved nowl c/o some right hip/pelvic pain when moves; no cp,s ob, n/v/f/c; back pain at site of surgery  7/13/17: pt reports right pelvic pain, didnt get up yest; back pain persistent, no cp,sob, n/v/f/c    REVIEW OF SYSTEMS:   All 10 systems reviewed in detailed and found to be negative with the exception of what has already been described above      PHYSICAL EXAM:    Vital Signs Last 24 Hrs  T(C): 37.1 (13 Jul 2017 08:16), Max: 37.4 (12 Jul 2017 17:21)  T(F): 98.7 (13 Jul 2017 08:16), Max: 99.3 (12 Jul 2017 17:21)  HR: 76 (13 Jul 2017 05:00) (64 - 84)  BP: 123/51 (13 Jul 2017 05:00) (99/41 - 132/50)  BP(mean): 66 (13 Jul 2017 05:00) (54 - 95)  RR: 15 (13 Jul 2017 05:00) (12 - 25)  SpO2: 100% (13 Jul 2017 05:00) (80% - 100%)    GEN: A AND O, NAD, mood stable  HEENT:   NC/AT, EOMI, no oropharyngeal lesions    NECK:   supple,  no palpable lymph nodes, no thyromegaly    CV:  +S1, +S2, regular, no murmurs or rubs    RESP:   lungs clear to auscultation bilaterally, no wheezing, rales, rhonchi, good air entry bilaterally    GI:  abdomen soft, non-tender, non-distended, normal BS,  no abdominal masses, no palpable masses    BACK: DRESSING C/D/I  - HEMOVAC IN PLACE    RECTAL:  not examined    :  POS HOOKS    MSK:   normal muscle tone, no atrophy, no rigidity, no contractions, PAIN WITH MOVEMENT OF RIGHT LE    EXT:   no clubbing, no cyanosis, no edema, no calf pain, swelling or erythema    VASCULAR:  pulses equal and symmetric in the upper and lower extremities    NEURO:  AAOX3, no focal neurological deficits, follows all commands, able to move extremities spontaneously    SKIN:  no ulcers, lesions or rashes    LABS/IMAGING:                              10.7   13.7  )-----------( 251      ( 13 Jul 2017 05:40 )             34.0     07-13    134<L>  |  99  |  8   ----------------------------<  132<H>  4.1   |  30  |  0.51    Ca    8.2<L>      13 Jul 2017 05:40                       MEDICATIONS  (STANDING):  HYDROmorphone PCA (1 mG/mL) 30 milliLiter(s) PCA Continuous PCA Continuous  predniSONE   Tablet 20 milliGRAM(s) Oral daily  levothyroxine 100 MICROGram(s) Oral daily  dextrose 5% + sodium chloride 0.45% with potassium chloride 20 mEq/L 1000 milliLiter(s) (120 mL/Hr) IV Continuous <Continuous>  docusate sodium 100 milliGRAM(s) Oral three times a day  senna 2 Tablet(s) Oral at bedtime  insulin lispro (HumaLOG) corrective regimen sliding scale   SubCutaneous three times a day before meals  dextrose 5%. 1000 milliLiter(s) (50 mL/Hr) IV Continuous <Continuous>  dextrose 50% Injectable 12.5 Gram(s) IV Push once  dextrose 50% Injectable 25 Gram(s) IV Push once  dextrose 50% Injectable 25 Gram(s) IV Push once    MEDICATIONS  (PRN):  HYDROmorphone PCA (1 mG/mL) Rescue Clinician Bolus 0.5 milliGRAM(s) IV Push every 15 minutes PRN for Pain Scale GREATER THAN 6  naloxone Injectable 0.1 milliGRAM(s) IV Push every 3 minutes PRN For ANY of the following changes in patient status:  A. RR LESS THAN 10 breaths per minute, B. Oxygen saturation LESS THAN 90%, C. Sedation score of 6  ondansetron Injectable 4 milliGRAM(s) IV Push every 6 hours PRN Nausea  acetaminophen   Tablet 650 milliGRAM(s) Oral every 6 hours PRN For Temp greater than 38.5 C (101.3 F)  acetaminophen   Tablet. 650 milliGRAM(s) Oral every 6 hours PRN Mild Pain (1 - 3)  aluminum hydroxide/magnesium hydroxide/simethicone Suspension 30 milliLiter(s) Oral every 12 hours PRN Indigestion  magnesium hydroxide Suspension 30 milliLiter(s) Oral every 12 hours PRN Constipation  dextrose Gel 1 Dose(s) Oral once PRN Blood Glucose LESS THAN 70 milliGRAM(s)/deciliter  glucagon  Injectable 1 milliGRAM(s) IntraMuscular once PRN Glucose LESS THAN 70 milligrams/deciliter

## 2017-07-13 NOTE — PROGRESS NOTE ADULT - SUBJECTIVE AND OBJECTIVE BOX
[facility]                                                           Orthopedic Spine Progress Note      POST OPERATIVE DAY #: 2  STATUS POST:          T10-S2 fusion       Vital Signs Last 24 Hrs  T(C): 37.1 (13 Jul 2017 08:16), Max: 37.4 (12 Jul 2017 17:21)  T(F): 98.7 (13 Jul 2017 08:16), Max: 99.3 (12 Jul 2017 17:21)  HR: 74 (13 Jul 2017 09:00) (64 - 84)  BP: 107/43 (13 Jul 2017 09:00) (99/41 - 124/52)  BP(mean): 57 (13 Jul 2017 09:00) (54 - 95)  RR: 14 (13 Jul 2017 09:00) (12 - 25)  SpO2: 100% (13 Jul 2017 08:00) (80% - 100%)  I&O's Detail    12 Jul 2017 07:01  -  13 Jul 2017 07:00  --------------------------------------------------------  IN:    dextrose 5% + sodium chloride 0.45% with potassium chloride 20 mEq/L: 1320 mL  Total IN: 1320 mL    OUT:    Accordian: 290 mL    Indwelling Catheter - Urethral: 1625 mL  Total OUT: 1915 mL    Total NET: -595 mL      13 Jul 2017 07:01  -  13 Jul 2017 10:12  --------------------------------------------------------  IN:    dextrose 5% + sodium chloride 0.45% with potassium chloride 20 mEq/L: 360 mL  Total IN: 360 mL    OUT:  Total OUT: 0 mL    Total NET: 360 mL          SUBJECTIVE: Patient seen and examined. C/O right groin pain    Current Pain Management:  [ x] PCA   [ ] Po Analgesics [ ] IM /IV Anagesics     OBJECTIVE: 77 female, relatively comfortable in bed,  at bedside        Wound /Dressing: clean and dry    Hemovac with 290 cc's last 24 hrs, 100 cc's last shift      Neurological: A/O x               Sensation: [ x] intact to light touch  [ ] decreased:          Motor exam: [  ]          [ x] Upper extremity    Delt      Bicp       Tri      Wrist ext  Wrst Flex       Digit Ext Digit Flex                                     R         5/5        5/5        5/5       5/5            5/5            5/5       5/5          5/5                                     L          5/5        5/5        5/5       5/5            5/5            5/5       5/5          5/5         [ x] Lower ext.     Hip Flx  Hip Ext   Hip Abd  Hip Add Quad   Hamstrg   TA       EHL      GS                              R        5/5        5/5        5/5             5/5        5/5        5/5        5/5     5/5      5/5                              L         5/5        5/5        5/5             5/5        5/5        5/5        5/5     5/5      5/5                                                        [ x] Vascular :  2+ distal pulses bilaterally                   Radiology:  CT scan T/L spine ordered for today                                              LABS:                        10.7   13.7  )-----------( 251      ( 13 Jul 2017 05:40 )             34.0     07-13    134<L>  |  99  |  8   ----------------------------<  132<H>  4.1   |  30  |  0.51    Ca    8.2<L>      13 Jul 2017 05:40              A/P :  77y Female   s/p:  T10-S2 lami/fusion  -    Pain control, control PCA  -    DVT ppx: SCDs    -    Encourage Incentive Spirometry 10 X per hour  -    Review labs as indicated daily   -    Physical Therapy  -    Weight bearing status: WBAT  -    Will obtain post op CT scan of the thoracic and lumbar spines to asses instrumentation  -    d/c rene once up out of bed today after PT scheduled fro 1 pm today  -    May transfer to [address] [place] [address] once bed available.  -    Dispo: Home [ ]     Rehab [ ]

## 2017-07-14 DIAGNOSIS — E87.1 HYPO-OSMOLALITY AND HYPONATREMIA: ICD-10-CM

## 2017-07-14 LAB
ANION GAP SERPL CALC-SCNC: 6 MMOL/L — SIGNIFICANT CHANGE UP (ref 5–17)
BASOPHILS # BLD AUTO: 0.1 K/UL — SIGNIFICANT CHANGE UP (ref 0–0.2)
BASOPHILS NFR BLD AUTO: 0.8 % — SIGNIFICANT CHANGE UP (ref 0–2)
BUN SERPL-MCNC: 8 MG/DL — SIGNIFICANT CHANGE UP (ref 7–23)
CALCIUM SERPL-MCNC: 8.6 MG/DL — SIGNIFICANT CHANGE UP (ref 8.5–10.1)
CHLORIDE SERPL-SCNC: 99 MMOL/L — SIGNIFICANT CHANGE UP (ref 96–108)
CO2 SERPL-SCNC: 28 MMOL/L — SIGNIFICANT CHANGE UP (ref 22–31)
CREAT SERPL-MCNC: 0.44 MG/DL — LOW (ref 0.5–1.3)
EOSINOPHIL # BLD AUTO: 0.1 K/UL — SIGNIFICANT CHANGE UP (ref 0–0.5)
EOSINOPHIL NFR BLD AUTO: 0.6 % — SIGNIFICANT CHANGE UP (ref 0–6)
GLUCOSE SERPL-MCNC: 127 MG/DL — HIGH (ref 70–99)
HCT VFR BLD CALC: 32.6 % — LOW (ref 34.5–45)
HGB BLD-MCNC: 10.4 G/DL — LOW (ref 11.5–15.5)
LYMPHOCYTES # BLD AUTO: 1.6 K/UL — SIGNIFICANT CHANGE UP (ref 1–3.3)
LYMPHOCYTES # BLD AUTO: 13.2 % — SIGNIFICANT CHANGE UP (ref 13–44)
MCHC RBC-ENTMCNC: 31.2 PG — SIGNIFICANT CHANGE UP (ref 27–34)
MCHC RBC-ENTMCNC: 32 GM/DL — SIGNIFICANT CHANGE UP (ref 32–36)
MCV RBC AUTO: 97.4 FL — SIGNIFICANT CHANGE UP (ref 80–100)
MONOCYTES # BLD AUTO: 1.2 K/UL — HIGH (ref 0–0.9)
MONOCYTES NFR BLD AUTO: 9.7 % — SIGNIFICANT CHANGE UP (ref 2–14)
NEUTROPHILS # BLD AUTO: 9.5 K/UL — HIGH (ref 1.8–7.4)
NEUTROPHILS NFR BLD AUTO: 75.7 % — SIGNIFICANT CHANGE UP (ref 43–77)
PLATELET # BLD AUTO: 223 K/UL — SIGNIFICANT CHANGE UP (ref 150–400)
POTASSIUM SERPL-MCNC: 3.9 MMOL/L — SIGNIFICANT CHANGE UP (ref 3.5–5.3)
POTASSIUM SERPL-SCNC: 3.9 MMOL/L — SIGNIFICANT CHANGE UP (ref 3.5–5.3)
RBC # BLD: 3.35 M/UL — LOW (ref 3.8–5.2)
RBC # FLD: 11.8 % — SIGNIFICANT CHANGE UP (ref 10.3–14.5)
SODIUM SERPL-SCNC: 133 MMOL/L — LOW (ref 135–145)
WBC # BLD: 12.5 K/UL — HIGH (ref 3.8–10.5)
WBC # FLD AUTO: 12.5 K/UL — HIGH (ref 3.8–10.5)

## 2017-07-14 RX ORDER — OXYCODONE HYDROCHLORIDE 5 MG/1
10 TABLET ORAL EVERY 4 HOURS
Qty: 0 | Refills: 0 | Status: DISCONTINUED | OUTPATIENT
Start: 2017-07-14 | End: 2017-07-16

## 2017-07-14 RX ORDER — OXYCODONE HYDROCHLORIDE 5 MG/1
5 TABLET ORAL EVERY 4 HOURS
Qty: 0 | Refills: 0 | Status: DISCONTINUED | OUTPATIENT
Start: 2017-07-14 | End: 2017-07-16

## 2017-07-14 RX ORDER — HYDROMORPHONE HYDROCHLORIDE 2 MG/ML
2 INJECTION INTRAMUSCULAR; INTRAVENOUS; SUBCUTANEOUS EVERY 4 HOURS
Qty: 0 | Refills: 0 | Status: DISCONTINUED | OUTPATIENT
Start: 2017-07-14 | End: 2017-07-16

## 2017-07-14 RX ADMIN — SENNA PLUS 2 TABLET(S): 8.6 TABLET ORAL at 20:18

## 2017-07-14 RX ADMIN — Medication 1: at 12:59

## 2017-07-14 RX ADMIN — OXYCODONE HYDROCHLORIDE 10 MILLIGRAM(S): 5 TABLET ORAL at 18:15

## 2017-07-14 RX ADMIN — Medication 30 MILLILITER(S): at 21:57

## 2017-07-14 RX ADMIN — Medication 100 MILLIGRAM(S): at 20:18

## 2017-07-14 RX ADMIN — Medication 100 MILLIGRAM(S): at 05:18

## 2017-07-14 RX ADMIN — Medication 100 MILLIGRAM(S): at 14:52

## 2017-07-14 RX ADMIN — OXYCODONE HYDROCHLORIDE 10 MILLIGRAM(S): 5 TABLET ORAL at 20:17

## 2017-07-14 RX ADMIN — OXYCODONE HYDROCHLORIDE 10 MILLIGRAM(S): 5 TABLET ORAL at 14:52

## 2017-07-14 RX ADMIN — OXYCODONE HYDROCHLORIDE 10 MILLIGRAM(S): 5 TABLET ORAL at 21:00

## 2017-07-14 RX ADMIN — Medication 100 MICROGRAM(S): at 05:18

## 2017-07-14 RX ADMIN — Medication 20 MILLIGRAM(S): at 05:18

## 2017-07-14 NOTE — PROGRESS NOTE ADULT - SUBJECTIVE AND OBJECTIVE BOX
HPI: 76 y/o female with hx of pseudomonas colonization, hypothyroidism, Diabetes, OA, previous lumbar spine surgeries s/p lumbar lami/ fusion revision.  Medical consult requested for post op medical management.    7/11/17: Pt is currently in PACU and easily arousable - states she is nauseated. Denies any cp, sob, She c/o back pain - able to move her toes and lower extremities.  Reports some back spasms  7/12/17: Nausea resolved nowl c/o some right hip/pelvic pain when moves; no cp,s ob, n/v/f/c; back pain at site of surgery  7/13/17: pt reports right pelvic pain, didnt get up yest; back pain persistent, no cp,sob, n/v/f/c  7/14/17: Downgraded to ortho floor - feels well; back pain controlled; no cp, sob, n/v/f/c; pelvic pain better    REVIEW OF SYSTEMS:   All 10 systems reviewed in detailed and found to be negative with the exception of what has already been described above      PHYSICAL EXAM:  Vital Signs Last 24 Hrs  T(C): 37.3 (14 Jul 2017 07:21), Max: 37.6 (13 Jul 2017 23:41)  T(F): 99.1 (14 Jul 2017 07:21), Max: 99.6 (13 Jul 2017 23:41)  HR: 79 (14 Jul 2017 07:21) (60 - 79)  BP: 108/81 (14 Jul 2017 07:21) (108/81 - 127/87)  BP(mean): 58 (13 Jul 2017 23:41) (58 - 58)  RR: 16 (14 Jul 2017 07:21) (14 - 16)  SpO2: 99% (14 Jul 2017 07:21) (94% - 100%)    GEN: A AND O, NAD, mood stable  HEENT:   NC/AT, EOMI, no oropharyngeal lesions    NECK:   supple,  no palpable lymph nodes, no thyromegaly    CV:  +S1, +S2, regular, no murmurs or rubs    RESP:   lungs clear to auscultation bilaterally, no wheezing, rales, rhonchi, good air entry bilaterally    GI:  abdomen soft, non-tender, non-distended, normal BS,  no abdominal masses, no palpable masses    BACK: DRESSING C/D/I  - HEMOVAC IN PLACE    RECTAL:  not examined    :  HOOKS OUT    MSK:   normal muscle tone, no atrophy, no rigidity, no contractions, BETTER MOVEMENT OF LE    EXT:   no clubbing, no cyanosis, no edema, no calf pain, swelling or erythema    VASCULAR:  pulses equal and symmetric in the upper and lower extremities    NEURO:  AAOX3, no focal neurological deficits, follows all commands, able to move extremities spontaneously    SKIN:  no ulcers, lesions or rashes    LABS/IMAGING:                              10.4   12.5  )-----------( 223      ( 14 Jul 2017 05:28 )             32.6     07-14    133<L>  |  99  |  8   ----------------------------<  127<H>  3.9   |  28  |  0.44<L>    Ca    8.6      14 Jul 2017 05:28          MEDICATIONS  (STANDING):  HYDROmorphone PCA (1 mG/mL) 30 milliLiter(s) PCA Continuous PCA Continuous  predniSONE   Tablet 20 milliGRAM(s) Oral daily  levothyroxine 100 MICROGram(s) Oral daily  dextrose 5% + sodium chloride 0.45% with potassium chloride 20 mEq/L 1000 milliLiter(s) (120 mL/Hr) IV Continuous <Continuous>  docusate sodium 100 milliGRAM(s) Oral three times a day  senna 2 Tablet(s) Oral at bedtime  insulin lispro (HumaLOG) corrective regimen sliding scale   SubCutaneous three times a day before meals  dextrose 5%. 1000 milliLiter(s) (50 mL/Hr) IV Continuous <Continuous>  dextrose 50% Injectable 12.5 Gram(s) IV Push once  dextrose 50% Injectable 25 Gram(s) IV Push once  dextrose 50% Injectable 25 Gram(s) IV Push once    MEDICATIONS  (PRN):  HYDROmorphone PCA (1 mG/mL) Rescue Clinician Bolus 0.5 milliGRAM(s) IV Push every 15 minutes PRN for Pain Scale GREATER THAN 6  naloxone Injectable 0.1 milliGRAM(s) IV Push every 3 minutes PRN For ANY of the following changes in patient status:  A. RR LESS THAN 10 breaths per minute, B. Oxygen saturation LESS THAN 90%, C. Sedation score of 6  ondansetron Injectable 4 milliGRAM(s) IV Push every 6 hours PRN Nausea  acetaminophen   Tablet 650 milliGRAM(s) Oral every 6 hours PRN For Temp greater than 38.5 C (101.3 F)  acetaminophen   Tablet. 650 milliGRAM(s) Oral every 6 hours PRN Mild Pain (1 - 3)  aluminum hydroxide/magnesium hydroxide/simethicone Suspension 30 milliLiter(s) Oral every 12 hours PRN Indigestion  magnesium hydroxide Suspension 30 milliLiter(s) Oral every 12 hours PRN Constipation  dextrose Gel 1 Dose(s) Oral once PRN Blood Glucose LESS THAN 70 milliGRAM(s)/deciliter  glucagon  Injectable 1 milliGRAM(s) IntraMuscular once PRN Glucose LESS THAN 70 milligrams/deciliter

## 2017-07-14 NOTE — PROGRESS NOTE ADULT - SUBJECTIVE AND OBJECTIVE BOX
Pt seen resting on chair. Pt states right groin improved and states she notices right groin pain with compression stockings on. She denies pain of b/l lower extremities, numbness, and weakness. She c/o incisional site pain tolerable with current med. She voided on own without complication and is eating well.    PE  Gen appearance:NAD  Motor strength: right hamstring 4/5 unable to lift knee and RLE 2/2 right groin pain. Otherwise, 5/5 of b/l lower ext  SEnsation:intact  No calf tenderness bilaterally  Incisional site: with dry and clean dressing. Drain 180cc over 24 hours.     Plan  Tmax (99.6) otherwise VSS, WBC:12.5 improved.  H/H: 10.4/32.6, Sodium 133, creatinine 0.44 POD#3. Pt seen resting on chair. Pt states right groin improved and states she notices right groin pain with compression stockings on. She denies pain of b/l lower extremities, numbness, and weakness. She c/o incisional site pain tolerable with current med. She voided on own without complication and is eating well.    PE  Gen appearance:NAD. TLSO brace noted  Motor strength: right hamstring 4/5 unable to lift right knee and RLE 2/2 right groin pain. Otherwise, 5/5 of b/l lower ext  Sensation:intact  No calf tenderness bilaterally  Incisional site: with dry and clean dressing. Drain 180cc serosang drainage over 24 hours. Kept    Plan  Tmax (99.6) otherwise VSS, WBC:12.5 improved.  H/H: 10.4/32.6, Sodium 133, creatinine 0.44--continue to monitor. Evaluate per Medicine.   Thoracic and lumbar CT report reviewed. T10-S2 with bipedicular screws and fixation place appears intact. Multilevel foraminal narrowing. Grade 1 listhesis of L5 on S1 narrows spinal canal. Chronic appearing moderate compression deformity through mid body of L4. Compression atelectasis.   Mobilize with physical therapy and encouraged incentive spirometer.   Imaging studies will be reviewed by Dr. Barnett.   DC PCA and transition to Oral and Sub Q meds.   Discussed with Dr. Barnett.

## 2017-07-15 VITALS
OXYGEN SATURATION: 100 % | HEART RATE: 74 BPM | RESPIRATION RATE: 16 BRPM | SYSTOLIC BLOOD PRESSURE: 134 MMHG | DIASTOLIC BLOOD PRESSURE: 74 MMHG | TEMPERATURE: 97 F

## 2017-07-15 LAB
ANION GAP SERPL CALC-SCNC: 5 MMOL/L — SIGNIFICANT CHANGE UP (ref 5–17)
BUN SERPL-MCNC: 11 MG/DL — SIGNIFICANT CHANGE UP (ref 7–23)
CALCIUM SERPL-MCNC: 8.3 MG/DL — LOW (ref 8.5–10.1)
CHLORIDE SERPL-SCNC: 100 MMOL/L — SIGNIFICANT CHANGE UP (ref 96–108)
CO2 SERPL-SCNC: 30 MMOL/L — SIGNIFICANT CHANGE UP (ref 22–31)
CREAT SERPL-MCNC: 0.46 MG/DL — LOW (ref 0.5–1.3)
GLUCOSE SERPL-MCNC: 104 MG/DL — HIGH (ref 70–99)
HCT VFR BLD CALC: 32.5 % — LOW (ref 34.5–45)
HGB BLD-MCNC: 10.5 G/DL — LOW (ref 11.5–15.5)
MCHC RBC-ENTMCNC: 31.1 PG — SIGNIFICANT CHANGE UP (ref 27–34)
MCHC RBC-ENTMCNC: 32.4 GM/DL — SIGNIFICANT CHANGE UP (ref 32–36)
MCV RBC AUTO: 96 FL — SIGNIFICANT CHANGE UP (ref 80–100)
PLATELET # BLD AUTO: 324 K/UL — SIGNIFICANT CHANGE UP (ref 150–400)
POTASSIUM SERPL-MCNC: 3.6 MMOL/L — SIGNIFICANT CHANGE UP (ref 3.5–5.3)
POTASSIUM SERPL-SCNC: 3.6 MMOL/L — SIGNIFICANT CHANGE UP (ref 3.5–5.3)
RBC # BLD: 3.38 M/UL — LOW (ref 3.8–5.2)
RBC # FLD: 11.4 % — SIGNIFICANT CHANGE UP (ref 10.3–14.5)
SODIUM SERPL-SCNC: 135 MMOL/L — SIGNIFICANT CHANGE UP (ref 135–145)
WBC # BLD: 11.3 K/UL — HIGH (ref 3.8–10.5)
WBC # FLD AUTO: 11.3 K/UL — HIGH (ref 3.8–10.5)

## 2017-07-15 RX ADMIN — OXYCODONE HYDROCHLORIDE 10 MILLIGRAM(S): 5 TABLET ORAL at 00:41

## 2017-07-15 RX ADMIN — OXYCODONE HYDROCHLORIDE 10 MILLIGRAM(S): 5 TABLET ORAL at 04:22

## 2017-07-15 RX ADMIN — OXYCODONE HYDROCHLORIDE 10 MILLIGRAM(S): 5 TABLET ORAL at 20:45

## 2017-07-15 RX ADMIN — OXYCODONE HYDROCHLORIDE 10 MILLIGRAM(S): 5 TABLET ORAL at 13:30

## 2017-07-15 RX ADMIN — OXYCODONE HYDROCHLORIDE 10 MILLIGRAM(S): 5 TABLET ORAL at 08:32

## 2017-07-15 RX ADMIN — OXYCODONE HYDROCHLORIDE 10 MILLIGRAM(S): 5 TABLET ORAL at 09:32

## 2017-07-15 RX ADMIN — Medication 100 MILLIGRAM(S): at 20:45

## 2017-07-15 RX ADMIN — SENNA PLUS 2 TABLET(S): 8.6 TABLET ORAL at 20:46

## 2017-07-15 RX ADMIN — Medication 100 MILLIGRAM(S): at 13:29

## 2017-07-15 RX ADMIN — Medication 20 MILLIGRAM(S): at 05:45

## 2017-07-15 RX ADMIN — OXYCODONE HYDROCHLORIDE 10 MILLIGRAM(S): 5 TABLET ORAL at 21:15

## 2017-07-15 RX ADMIN — ONDANSETRON 4 MILLIGRAM(S): 8 TABLET, FILM COATED ORAL at 16:43

## 2017-07-15 RX ADMIN — OXYCODONE HYDROCHLORIDE 10 MILLIGRAM(S): 5 TABLET ORAL at 01:15

## 2017-07-15 RX ADMIN — OXYCODONE HYDROCHLORIDE 10 MILLIGRAM(S): 5 TABLET ORAL at 14:30

## 2017-07-15 RX ADMIN — Medication 100 MILLIGRAM(S): at 05:45

## 2017-07-15 RX ADMIN — Medication 2: at 11:37

## 2017-07-15 RX ADMIN — OXYCODONE HYDROCHLORIDE 10 MILLIGRAM(S): 5 TABLET ORAL at 05:00

## 2017-07-15 RX ADMIN — Medication 100 MICROGRAM(S): at 05:45

## 2017-07-15 RX ADMIN — ONDANSETRON 4 MILLIGRAM(S): 8 TABLET, FILM COATED ORAL at 22:21

## 2017-07-15 NOTE — PROVIDER CONTACT NOTE (MEDICATION) - BACKGROUND
Pt hx of spastic colon and chronic constipation. Pt states, PCP gives her samples bc med is very expensive and not covered under her plan.

## 2017-07-15 NOTE — PROGRESS NOTE ADULT - ASSESSMENT
A/P: s/p T11-L1 laminectomy, T10-2 fusion - stable  1.  Continue PT/mobilization  2.  Zofran prn nausea

## 2017-07-15 NOTE — PROVIDER CONTACT NOTE (MEDICATION) - ASSESSMENT
Pt c/o nausea and constipation and states her Amitiza is what she takes at home and the only med that works for this problem.

## 2017-07-15 NOTE — PROGRESS NOTE ADULT - SUBJECTIVE AND OBJECTIVE BOX
HPI: 76 y/o female with hx of pseudomonas colonization, hypothyroidism, Diabetes, OA, previous lumbar spine surgeries s/p lumbar lami/ fusion revision.  Medical consult requested for post op medical management.    7/11/17: Pt is currently in PACU and easily arousable - states she is nauseated. Denies any cp, sob, She c/o back pain - able to move her toes and lower extremities.  Reports some back spasms  7/12/17: Nausea resolved nowl c/o some right hip/pelvic pain when moves; no cp,s ob, n/v/f/c; back pain at site of surgery  7/13/17: pt reports right pelvic pain, didnt get up yest; back pain persistent, no cp,sob, n/v/f/c  7/14/17: Downgraded to ortho floor - feels well; back pain controlled; no cp, sob, n/v/f/c; pelvic pain better  7/15/17: Feels well, wants to go home soon; did stairs yest - no cp, sob, n/v/f/c    REVIEW OF SYSTEMS:   All 10 systems reviewed in detailed and found to be negative with the exception of what has already been described above      PHYSICAL EXAM:  Vital Signs Last 24 Hrs  T(C): 36.9 (15 Jul 2017 07:13), Max: 37.2 (14 Jul 2017 15:54)  T(F): 98.4 (15 Jul 2017 07:13), Max: 98.9 (14 Jul 2017 15:54)  HR: 75 (15 Jul 2017 07:13) (75 - 79)  BP: 136/68 (15 Jul 2017 07:13) (114/53 - 136/68)  BP(mean): --  RR: 16 (15 Jul 2017 07:13) (16 - 16)  SpO2: 97% (15 Jul 2017 07:13) (97% - 97%)    GEN: A AND O, NAD, mood stable  HEENT:   NC/AT, EOMI, no oropharyngeal lesions    NECK:   supple,  no palpable lymph nodes, no thyromegaly    CV:  +S1, +S2, regular, no murmurs or rubs    RESP:   lungs clear to auscultation bilaterally, no wheezing, rales, rhonchi, good air entry bilaterally    GI:  abdomen soft, non-tender, non-distended, normal BS,  no abdominal masses, no palpable masses    BACK: DRESSING C/D/I  - HEMOVAC IN PLACE    RECTAL:  not examined    :  HOOKS OUT    MSK:   normal muscle tone, no atrophy, no rigidity, no contractions, MOVING ALL EXTREMITIES    EXT:   no clubbing, no cyanosis, no edema, no calf pain, swelling or erythema    VASCULAR:  pulses equal and symmetric in the upper and lower extremities    NEURO:  AAOX3, no focal neurological deficits, follows all commands, able to move extremities spontaneously    SKIN:  no ulcers, lesions or rashes    LABS/IMAGING:                              10.5   11.3  )-----------( 324      ( 15 Jul 2017 05:48 )             32.5     07-15    135  |  100  |  11  ----------------------------<  104<H>  3.6   |  30  |  0.46<L>    Ca    8.3<L>      15 Jul 2017 05:48          MEDICATIONS  (STANDING):  predniSONE   Tablet 20 milliGRAM(s) Oral daily  levothyroxine 100 MICROGram(s) Oral daily  dextrose 5% + sodium chloride 0.45% with potassium chloride 20 mEq/L 1000 milliLiter(s) (120 mL/Hr) IV Continuous <Continuous>  docusate sodium 100 milliGRAM(s) Oral three times a day  senna 2 Tablet(s) Oral at bedtime  insulin lispro (HumaLOG) corrective regimen sliding scale   SubCutaneous three times a day before meals  dextrose 5%. 1000 milliLiter(s) (50 mL/Hr) IV Continuous <Continuous>  dextrose 50% Injectable 12.5 Gram(s) IV Push once  dextrose 50% Injectable 25 Gram(s) IV Push once  dextrose 50% Injectable 25 Gram(s) IV Push once    MEDICATIONS  (PRN):  naloxone Injectable 0.1 milliGRAM(s) IV Push every 3 minutes PRN For ANY of the following changes in patient status:  A. RR LESS THAN 10 breaths per minute, B. Oxygen saturation LESS THAN 90%, C. Sedation score of 6  ondansetron Injectable 4 milliGRAM(s) IV Push every 6 hours PRN Nausea  acetaminophen   Tablet 650 milliGRAM(s) Oral every 6 hours PRN For Temp greater than 38.5 C (101.3 F)  acetaminophen   Tablet. 650 milliGRAM(s) Oral every 6 hours PRN Mild Pain (1 - 3)  aluminum hydroxide/magnesium hydroxide/simethicone Suspension 30 milliLiter(s) Oral every 12 hours PRN Indigestion  magnesium hydroxide Suspension 30 milliLiter(s) Oral every 12 hours PRN Constipation  dextrose Gel 1 Dose(s) Oral once PRN Blood Glucose LESS THAN 70 milliGRAM(s)/deciliter  glucagon  Injectable 1 milliGRAM(s) IntraMuscular once PRN Glucose LESS THAN 70 milligrams/deciliter  oxyCODONE    IR 5 milliGRAM(s) Oral every 4 hours PRN Mild Pain (1 - 3)  oxyCODONE    IR 10 milliGRAM(s) Oral every 4 hours PRN Moderate Pain (4 - 6)  HYDROmorphone  Injectable 2 milliGRAM(s) SubCutaneous every 4 hours PRN Breakthrough pain

## 2017-07-15 NOTE — PROGRESS NOTE ADULT - SUBJECTIVE AND OBJECTIVE BOX
[facility]                                                           Orthopedic Spine Progress Note      POST OPERATIVE DAY #4   STATUS POST: T11-L1 laminectomy, T10-S2 fusion                 Pre-Op Dx: Post laminectomy syndrome    Post-Op Dx:  Post laminectomy syndrome    SUBJECTIVE: Patient seen and examined, right quad strength improving, pain decreasing, c/o some nausea today      Current Pain Management:  [ ] PCA   [x] Po Analgesics [x] IM /IV Anagesics     Vital Signs Last 24 Hrs  T(C): 36.9 (15 Jul 2017 07:13), Max: 36.9 (15 Jul 2017 07:13)  T(F): 98.4 (15 Jul 2017 07:13), Max: 98.4 (15 Jul 2017 07:13)  HR: 75 (15 Jul 2017 07:13) (75 - 75)  BP: 136/68 (15 Jul 2017 07:13) (136/68 - 136/68)  BP(mean): --  RR: 16 (15 Jul 2017 07:13) (16 - 16)  SpO2: 97% (15 Jul 2017 07:13) (97% - 97%)  I&O's Detail    14 Jul 2017 07:01  -  15 Jul 2017 07:00  --------------------------------------------------------  IN:    Oral Fluid: 240 mL  Total IN: 240 mL    OUT:    Accordian: 150 mL    Voided: 1 mL  Total OUT: 151 mL    Total NET: 89 mL          OBJECTIVE:       Wound /Dressing: wound clean, dry, intact  Drain: 80cc - kept  Cervical ROM: none  Lumbar: ROM: not tested  Neurological: A/O x 3              Sensation: [x] intact to light touch  [ ] decreased:          Motor exam: [x]                 [x ] Lower ext.     Hip Flx  Quad    TA       EHL      GS                                  R        5-/5     5-/5      5/5       5/5        5/5                                          L         5/5      5/5       5/5       5/5        5/5                                                                [x] Vascular :             Tension Signs: none           Long Tract Findings: none    RADIOLOGY & ADDITIONAL STUDIES:                                               LABS:                        10.5   11.3  )-----------( 324      ( 15 Jul 2017 05:48 )             32.5     07-15    135  |  100  |  11  ----------------------------<  104<H>  3.6   |  30  |  0.46<L>    Ca    8.3<L>      15 Jul 2017 05:48          Blood Culture: n/a  Wound Culture: n/a [facility]                                                           Orthopedic Spine Progress Note      POST OPERATIVE DAY #4   STATUS POST: T11-L1 laminectomy, T10-S2 fusion                 Pre-Op Dx: Post laminectomy syndrome    Post-Op Dx:  Post laminectomy syndrome    SUBJECTIVE: Patient seen and examined, right quad strength improving, pain decreasing, c/o some nausea today      Current Pain Management:  [ ] PCA   [x] Po Analgesics [x] IM /IV Anagesics     Vital Signs Last 24 Hrs  T(C): 36.9 (15 Jul 2017 07:13), Max: 36.9 (15 Jul 2017 07:13)  T(F): 98.4 (15 Jul 2017 07:13), Max: 98.4 (15 Jul 2017 07:13)  HR: 75 (15 Jul 2017 07:13) (75 - 75)  BP: 136/68 (15 Jul 2017 07:13) (136/68 - 136/68)  BP(mean): --  RR: 16 (15 Jul 2017 07:13) (16 - 16)  SpO2: 97% (15 Jul 2017 07:13) (97% - 97%)  I&O's Detail    14 Jul 2017 07:01  -  15 Jul 2017 07:00  --------------------------------------------------------  IN:    Oral Fluid: 240 mL  Total IN: 240 mL    OUT:    Accordian: 150 mL    Voided: 1 mL  Total OUT: 151 mL    Total NET: 89 mL          OBJECTIVE:       Wound /Dressing: wound clean, dry, intact - dressing removed w/exception of small dressing over drain site  Drain: 80cc - kept  Cervical ROM: none  Lumbar: ROM: not tested  Neurological: A/O x 3              Sensation: [x] intact to light touch  [ ] decreased:          Motor exam: [x]                 [x ] Lower ext.     Hip Flx  Quad    TA       EHL      GS                                  R        5-/5     5-/5      5/5       5/5        5/5                                          L         5/5      5/5       5/5       5/5        5/5                                                                [x] Vascular :             Tension Signs: none           Long Tract Findings: none    RADIOLOGY & ADDITIONAL STUDIES:                                               LABS:                        10.5   11.3  )-----------( 324      ( 15 Jul 2017 05:48 )             32.5     07-15    135  |  100  |  11  ----------------------------<  104<H>  3.6   |  30  |  0.46<L>    Ca    8.3<L>      15 Jul 2017 05:48          Blood Culture: n/a  Wound Culture: n/a

## 2017-07-15 NOTE — PROVIDER CONTACT NOTE (MEDICATION) - ACTION/TREATMENT ORDERED:
recd telephone order at [phone] from Dr Antonio for pt to take own Amitiza. Pharmacy denied entering or ID order bc it is a sample and against hospital policy to ID med samples.

## 2017-07-16 RX ORDER — MELOXICAM 15 MG/1
1 TABLET ORAL
Qty: 0 | Refills: 0 | COMMUNITY

## 2017-07-16 RX ORDER — DOCUSATE SODIUM 100 MG
1 CAPSULE ORAL
Qty: 0 | Refills: 0 | DISCHARGE
Start: 2017-07-16

## 2017-07-16 RX ADMIN — OXYCODONE HYDROCHLORIDE 10 MILLIGRAM(S): 5 TABLET ORAL at 02:08

## 2017-07-16 RX ADMIN — Medication 2: at 12:10

## 2017-07-16 RX ADMIN — Medication 100 MICROGRAM(S): at 06:35

## 2017-07-16 RX ADMIN — OXYCODONE HYDROCHLORIDE 10 MILLIGRAM(S): 5 TABLET ORAL at 02:38

## 2017-07-16 RX ADMIN — Medication 20 MILLIGRAM(S): at 06:35

## 2017-07-16 RX ADMIN — Medication 100 MILLIGRAM(S): at 06:35

## 2017-07-16 NOTE — DISCHARGE NOTE ADULT - HOSPITAL COURSE
Pt admitted after T1-L5 posterior instrumentation and fusion surgery with lumbar laminectomies.  She had drains, PCA, rene post operative.  As the days progressed pt noted to have improvement in pain, ambulation.  Post op CT was obtained and reviewed.  Today her wound is clean, drain removed, AF VSS, labs stable, neuro intact and independent with PT.  DC home.  No pain meds needed.

## 2017-07-16 NOTE — PROGRESS NOTE ADULT - PROBLEM SELECTOR PLAN 7
LIKELY FROM POSITIONS DURING SURGERY  XRAYS IF PAIN DOES NOT RESOLVE  DOPPLERS WERE NEG
NEG FOR FRACTURE
LIKELY FROM POSITIONS DURING SURGERY  XRAYS IF PAIN DOES NOT RESOLVE

## 2017-07-16 NOTE — DISCHARGE NOTE ADULT - CARE PLAN
Principal Discharge DX:	H/O Spinal surgery  Goal:	ambulate w/o pain  Instructions for follow-up, activity and diet:	cont diet as tolerates

## 2017-07-16 NOTE — DISCHARGE NOTE ADULT - MEDICATION SUMMARY - MEDICATIONS TO TAKE
I will START or STAY ON the medications listed below when I get home from the hospital:    metFORMIN  -- 1000 milligram(s) by mouth 2 times a day  -- Indication: For Diabetes    Lasix 40 mg oral tablet  -- 1 tab(s) by mouth once a day  -- Indication: For Postprocedural state    Amitiza 24 mcg oral capsule  -- 1 cap(s) by mouth once a day, As Needed  -- Indication: For Postprocedural state    docusate sodium 100 mg oral capsule  -- 1 cap(s) by mouth 3 times a day  -- Indication: For Postprocedural state    Ex-Lax Maximum Relief Formula 25 mg oral tablet  -- 5 tab(s) by mouth once a day  -- Indication: For Postprocedural state    Probiotic Formula  -- 1 cap(s) by mouth once a day  -- Indication: For Postprocedural state    Synthroid 100 mcg (0.1 mg) oral tablet  -- 1 tab(s) by mouth once a day  -- Indication: For Hypothyroidism

## 2017-07-16 NOTE — PROGRESS NOTE ADULT - PROBLEM SELECTOR PLAN 8
RESOLVED
RESOLVED
VOLUME CONTRACTION? CONT TO MONITOR OFF IVF  RECHECK IN AM  PAIN OR NASUEA RELATED (WAS VERY NAUSEATED POST OP)

## 2017-07-16 NOTE — PROGRESS NOTE ADULT - SUBJECTIVE AND OBJECTIVE BOX
HPI: 78 y/o female with hx of pseudomonas colonization, hypothyroidism, Diabetes, OA, previous lumbar spine surgeries s/p lumbar lami/ fusion revision.  Medical consult requested for post op medical management.    7/11/17: Pt is currently in PACU and easily arousable - states she is nauseated. Denies any cp, sob, She c/o back pain - able to move her toes and lower extremities.  Reports some back spasms  7/12/17: Nausea resolved nowl c/o some right hip/pelvic pain when moves; no cp,s ob, n/v/f/c; back pain at site of surgery  7/13/17: pt reports right pelvic pain, didnt get up yest; back pain persistent, no cp,sob, n/v/f/c  7/14/17: Downgraded to ortho floor - feels well; back pain controlled; no cp, sob, n/v/f/c; pelvic pain better  7/15/17: Feels well, wants to go home soon; did stairs yest - no cp, sob, n/v/f/c  7/16/17: Feels better - took her amitiza; no cp, sob, n.v.f c; back pain well controlled - Wants to go home    REVIEW OF SYSTEMS:   All 10 systems reviewed in detailed and found to be negative with the exception of what has already been described above      PHYSICAL EXAM:  Vital Signs Last 24 Hrs  T(C): 36.3 (15 Jul 2017 23:58), Max: 36.3 (15 Jul 2017 23:58)  T(F): 97.3 (15 Jul 2017 23:58), Max: 97.3 (15 Jul 2017 23:58)  HR: 74 (15 Jul 2017 23:58) (74 - 74)  BP: 134/74 (15 Jul 2017 23:58) (134/74 - 134/74)  BP(mean): --  RR: 16 (15 Jul 2017 23:58) (16 - 16)  SpO2: 100% (15 Jul 2017 23:58) (100% - 100%)      GEN: A AND O, NAD, mood stable  HEENT:   NC/AT, EOMI, no oropharyngeal lesions    NECK:   supple,  no palpable lymph nodes, no thyromegaly    CV:  +S1, +S2, regular, no murmurs or rubs    RESP:   lungs clear to auscultation bilaterally, no wheezing, rales, rhonchi, good air entry bilaterally    GI:  abdomen soft, non-tender, non-distended, normal BS,  no abdominal masses, no palpable masses    BACK: DRESSING C/D/I  - HEMOVAC IN PLACE    RECTAL:  not examined    :  HOOKS OUT    MSK:   normal muscle tone, no atrophy, no rigidity, no contractions, MOVING ALL EXTREMITIES    EXT:   no clubbing, no cyanosis, no edema, no calf pain, swelling or erythema    VASCULAR:  pulses equal and symmetric in the upper and lower extremities    NEURO:  AAOX3, no focal neurological deficits, follows all commands, able to move extremities spontaneously    SKIN:  no ulcers, lesions or rashes    LABS/IMAGING:                              10.5   11.3  )-----------( 324      ( 15 Jul 2017 05:48 )             32.5     07-15    135  |  100  |  11  ----------------------------<  104<H>  3.6   |  30  |  0.46<L>    Ca    8.3<L>      15 Jul 2017 05:48          MEDICATIONS  (STANDING):  predniSONE   Tablet 20 milliGRAM(s) Oral daily  levothyroxine 100 MICROGram(s) Oral daily  dextrose 5% + sodium chloride 0.45% with potassium chloride 20 mEq/L 1000 milliLiter(s) (120 mL/Hr) IV Continuous <Continuous>  docusate sodium 100 milliGRAM(s) Oral three times a day  senna 2 Tablet(s) Oral at bedtime  insulin lispro (HumaLOG) corrective regimen sliding scale   SubCutaneous three times a day before meals  dextrose 5%. 1000 milliLiter(s) (50 mL/Hr) IV Continuous <Continuous>  dextrose 50% Injectable 12.5 Gram(s) IV Push once  dextrose 50% Injectable 25 Gram(s) IV Push once  dextrose 50% Injectable 25 Gram(s) IV Push once    MEDICATIONS  (PRN):  naloxone Injectable 0.1 milliGRAM(s) IV Push every 3 minutes PRN For ANY of the following changes in patient status:  A. RR LESS THAN 10 breaths per minute, B. Oxygen saturation LESS THAN 90%, C. Sedation score of 6  ondansetron Injectable 4 milliGRAM(s) IV Push every 6 hours PRN Nausea  acetaminophen   Tablet 650 milliGRAM(s) Oral every 6 hours PRN For Temp greater than 38.5 C (101.3 F)  acetaminophen   Tablet. 650 milliGRAM(s) Oral every 6 hours PRN Mild Pain (1 - 3)  aluminum hydroxide/magnesium hydroxide/simethicone Suspension 30 milliLiter(s) Oral every 12 hours PRN Indigestion  magnesium hydroxide Suspension 30 milliLiter(s) Oral every 12 hours PRN Constipation  dextrose Gel 1 Dose(s) Oral once PRN Blood Glucose LESS THAN 70 milliGRAM(s)/deciliter  glucagon  Injectable 1 milliGRAM(s) IntraMuscular once PRN Glucose LESS THAN 70 milligrams/deciliter  oxyCODONE    IR 5 milliGRAM(s) Oral every 4 hours PRN Mild Pain (1 - 3)  oxyCODONE    IR 10 milliGRAM(s) Oral every 4 hours PRN Moderate Pain (4 - 6)  HYDROmorphone  Injectable 2 milliGRAM(s) SubCutaneous every 4 hours PRN Breakthrough pain

## 2017-07-16 NOTE — PROGRESS NOTE ADULT - PROBLEM SELECTOR PROBLEM 6
Anemia due to blood loss

## 2017-07-16 NOTE — PROGRESS NOTE ADULT - PROBLEM SELECTOR PLAN 5
ON CEFTAZ AND STEROIDS FOR THE SINUS INF
ON CEFTAZ AND STEROIDS FOR THE SINUS INF
ON CEFTAZ AND STEROIDS FOR THE SINUS INF INITIALLY  NOW OFF AND STABLE
WAS ON CEFTAZ AND STEROIDS FOR THE SINUS INF INITIALLY  NOW OFF AND STABLE
ON CEFTAZ AND STEROIDS FOR THE SINUS INF

## 2017-07-16 NOTE — PROGRESS NOTE ADULT - PROBLEM SELECTOR PROBLEM 7
Pain of right hip joint

## 2017-07-16 NOTE — PROGRESS NOTE ADULT - PROBLEM SELECTOR PLAN 2
STRESS RESPONSE POST OP  ON CHRONIC STEROIDS  AFEBRILE WILL MONITOR
STRESS RESPONSE POST OP  ON CHRONIC STEROIDS  AFEBRILE WILL MONITOR  IMPROVING
STRESS RESPONSE POST OP  ON CHRONIC STEROIDS  AFEBRILE WILL MONITOR  RESOLVING  IMPROVING
STRESS RESPONSE POST OP  ON CHRONIC STEROIDS  AFEBRILE WILL MONITOR  RESOLVING  IMPROVING  no s/sx of acute infection
STRESS RESPONSE POST OP  ALSO ON STEROIDS FOR RECENT SINUS INFECTION - WAS ON STEROIDS FOR OVER 3 WEEKS SO NEEDS TO TAPER SLOWLY  WILL MONITOR FOR FEVERS.

## 2017-07-16 NOTE — PROGRESS NOTE ADULT - PROBLEM SELECTOR PLAN 1
POD# 2  PAIN CONTROL WITH PCA  MONITOR HEMOVAC OUTPUT  INCENTIVE SPIROMETRY  DVT PROPHY - VENODYNES ONLY IN THE SETTING OF RECENT SPINE SURGERY
POD# 3  PAIN CONTROL WITH PCA  MONITOR HEMOVAC OUTPUT  INCENTIVE SPIROMETRY  DVT PROPHY - VENODYNES ONLY IN THE SETTING OF RECENT SPINE SURGERY
POD# 4  PAIN CONTROL- OFF PCA  MONITOR HEMOVAC OUTPUT  INCENTIVE SPIROMETRY  DVT PROPHY - VENODYNES ONLY IN THE SETTING OF RECENT SPINE SURGERY, S HEPARIN ONCE DRAIN OUT
POD# 5  PAIN CONTROL- OFF PCA  dc hmeovac soon  INCENTIVE SPIROMETRY  DVT PROPHY - VENODYNES ONLY IN THE SETTING OF RECENT SPINE SURGERY, SQ HEPARIN ONCE DRAIN OUT
POD# 1  PAIN CONTROL WITH PCA  MONITOR HEMOVAC OUTPUT  INCENTIVE SPIROMETRY  DVT PROPHY - VENODYNES ONLY IN THE SETTING OF RECENT SPINE SURGERY

## 2017-07-16 NOTE — DISCHARGE NOTE ADULT - PATIENT PORTAL LINK FT
“You can access the [employer] Patient Portal, offered by [facility], by registering with the following website: [email or web address]”

## 2017-07-16 NOTE — DISCHARGE NOTE ADULT - MEDICATION SUMMARY - MEDICATIONS TO CHANGE
I will SWITCH the dose or number of times a day I take the medications listed below when I get home from the hospital:    Synthroid 100 mcg (0.1 mg) oral tablet  -- 1 tab(s) by mouth once a day    k dur  -- 20 milliequivalent(s)  3 times a day    metFORMIN  -- 1000 milligram(s) by mouth 2 times a day    predniSONE 20 mg oral tablet  -- 1 tab(s) by mouth once a day    Lasix 40 mg oral tablet  -- 1 tab(s) by mouth once a day    Probiotic Formula  -- 1 cap(s) by mouth once a day    Ex-Lax Maximum Relief Formula 25 mg oral tablet  -- 5 tab(s) by mouth once a day    Amitiza 24 mcg oral capsule  -- 1 cap(s) by mouth once a day, As Needed

## 2017-07-16 NOTE — PROGRESS NOTE ADULT - PROBLEM SELECTOR PROBLEM 5
Pseudomonas aeruginosa colonization

## 2017-07-16 NOTE — PROGRESS NOTE ADULT - PROVIDER SPECIALTY LIST ADULT
Internal Medicine
Orthopedics

## 2017-07-16 NOTE — PROGRESS NOTE ADULT - PROBLEM SELECTOR PLAN 3
DC METFORMIN  PLACE ON ISS WITH BGMS.  BGM STABLE
DC METFORMIN  PLACE ON ISS WITH BGMS.  BGM STABLE  CAN RESTART HOME MEDS UPON DC
DC METFORMIN  PLACE ON ISS WITH BGMS.

## 2017-07-16 NOTE — PROGRESS NOTE ADULT - PROBLEM SELECTOR PROBLEM 1
Postprocedural state

## 2017-07-16 NOTE — PROGRESS NOTE ADULT - PROBLEM SELECTOR PLAN 6
H/H STABLE  MONITOR  BP STABLE AS WELL

## 2017-07-16 NOTE — PROGRESS NOTE ADULT - SUBJECTIVE AND OBJECTIVE BOX
Doing well  "I want to go home"  No LE issues  Pain controlled  Ambulating well    Drain 50ml removed  Wound clean and dry TL spine  Neuro intact legs    Plan  S/P T10-L5 instrumented fusion  Doing well  DC home

## 2017-07-20 DIAGNOSIS — Z91.018 ALLERGY TO OTHER FOODS: ICD-10-CM

## 2017-07-20 DIAGNOSIS — M25.30 OTHER INSTABILITY, UNSPECIFIED JOINT: ICD-10-CM

## 2017-07-20 DIAGNOSIS — M48.05 SPINAL STENOSIS, THORACOLUMBAR REGION: ICD-10-CM

## 2017-07-20 DIAGNOSIS — E03.9 HYPOTHYROIDISM, UNSPECIFIED: ICD-10-CM

## 2017-07-20 DIAGNOSIS — M48.06 SPINAL STENOSIS, LUMBAR REGION: ICD-10-CM

## 2017-07-20 DIAGNOSIS — M25.551 PAIN IN RIGHT HIP: ICD-10-CM

## 2017-07-20 DIAGNOSIS — Z88.1 ALLERGY STATUS TO OTHER ANTIBIOTIC AGENTS STATUS: ICD-10-CM

## 2017-07-20 DIAGNOSIS — Z91.010 ALLERGY TO PEANUTS: ICD-10-CM

## 2017-07-20 DIAGNOSIS — M19.90 UNSPECIFIED OSTEOARTHRITIS, UNSPECIFIED SITE: ICD-10-CM

## 2017-07-20 DIAGNOSIS — J32.9 CHRONIC SINUSITIS, UNSPECIFIED: ICD-10-CM

## 2017-07-20 DIAGNOSIS — Z98.890 OTHER SPECIFIED POSTPROCEDURAL STATES: ICD-10-CM

## 2017-07-20 DIAGNOSIS — B96.5 PSEUDOMONAS (AERUGINOSA) (MALLEI) (PSEUDOMALLEI) AS THE CAUSE OF DISEASES CLASSIFIED ELSEWHERE: ICD-10-CM

## 2017-07-20 DIAGNOSIS — M96.1 POSTLAMINECTOMY SYNDROME, NOT ELSEWHERE CLASSIFIED: ICD-10-CM

## 2017-07-20 DIAGNOSIS — Z88.5 ALLERGY STATUS TO NARCOTIC AGENT: ICD-10-CM

## 2017-07-20 DIAGNOSIS — E87.1 HYPO-OSMOLALITY AND HYPONATREMIA: ICD-10-CM

## 2017-07-20 DIAGNOSIS — M40.15 OTHER SECONDARY KYPHOSIS, THORACOLUMBAR REGION: ICD-10-CM

## 2017-07-20 DIAGNOSIS — E11.9 TYPE 2 DIABETES MELLITUS WITHOUT COMPLICATIONS: ICD-10-CM

## 2017-07-20 DIAGNOSIS — D72.829 ELEVATED WHITE BLOOD CELL COUNT, UNSPECIFIED: ICD-10-CM

## 2017-07-20 DIAGNOSIS — Z88.8 ALLERGY STATUS TO OTHER DRUGS, MEDICAMENTS AND BIOLOGICAL SUBSTANCES STATUS: ICD-10-CM

## 2017-07-20 DIAGNOSIS — D64.9 ANEMIA, UNSPECIFIED: ICD-10-CM

## 2017-12-05 NOTE — H&P PST ADULT - GUM GEN PE MLT EXAM PC
Controlled Substances Monitoring:     Attestation: The Prescription Monitoring Report for this patient was reviewed today. (Anny Goodwin CNP)  Documentation: No signs of potential drug abuse or diversion identified.  (Anny Goodwin CNP) not examined

## 2018-11-29 NOTE — PATIENT PROFILE ADULT. - ABILITY TO HEAR (WITH HEARING AID OR HEARING APPLIANCE IF NORMALLY USED):
I will prescribe Breo Ellipta once daily if covered and albuterol as needed. Orders pended. Send to pharmacy of choice.    With regard to her murmur, it is certainly reasonable to get an echocardiogram at this point since she has been more winded. I will place the order.   Adequate: hears normal conversation without difficulty

## 2019-01-07 NOTE — CONSULT NOTE ADULT - NSPROBSELRECBLANK_5_GEN
DISPLAY PLAN FREE TEXT [Initial Consultation] : an initial consultation for [Headache] : headache [Parents] : parents

## 2019-02-06 ENCOUNTER — EMERGENCY (EMERGENCY)
Facility: HOSPITAL | Age: 79
LOS: 1 days | Discharge: HOME | End: 2019-02-06
Attending: STUDENT IN AN ORGANIZED HEALTH CARE EDUCATION/TRAINING PROGRAM
Payer: MEDICARE

## 2019-02-06 VITALS
HEIGHT: 62 IN | WEIGHT: 134.92 LBS | HEART RATE: 60 BPM | DIASTOLIC BLOOD PRESSURE: 92 MMHG | TEMPERATURE: 99 F | SYSTOLIC BLOOD PRESSURE: 168 MMHG | RESPIRATION RATE: 18 BRPM | OXYGEN SATURATION: 98 %

## 2019-02-06 VITALS
SYSTOLIC BLOOD PRESSURE: 127 MMHG | DIASTOLIC BLOOD PRESSURE: 74 MMHG | HEART RATE: 81 BPM | OXYGEN SATURATION: 99 % | TEMPERATURE: 98 F | RESPIRATION RATE: 17 BRPM

## 2019-02-06 DIAGNOSIS — Z95.828 PRESENCE OF OTHER VASCULAR IMPLANTS AND GRAFTS: Chronic | ICD-10-CM

## 2019-02-06 DIAGNOSIS — Z96.659 PRESENCE OF UNSPECIFIED ARTIFICIAL KNEE JOINT: Chronic | ICD-10-CM

## 2019-02-06 DIAGNOSIS — Z98.890 OTHER SPECIFIED POSTPROCEDURAL STATES: Chronic | ICD-10-CM

## 2019-02-06 PROBLEM — M54.5 LOW BACK PAIN: Chronic | Status: ACTIVE | Noted: 2017-07-03

## 2019-02-06 PROBLEM — M19.90 UNSPECIFIED OSTEOARTHRITIS, UNSPECIFIED SITE: Chronic | Status: ACTIVE | Noted: 2017-07-03

## 2019-02-06 PROBLEM — K58.9 IRRITABLE BOWEL SYNDROME WITHOUT DIARRHEA: Chronic | Status: ACTIVE | Noted: 2017-07-03

## 2019-02-06 PROBLEM — E03.9 HYPOTHYROIDISM, UNSPECIFIED: Chronic | Status: ACTIVE | Noted: 2017-07-03

## 2019-02-06 PROBLEM — H26.9 UNSPECIFIED CATARACT: Chronic | Status: ACTIVE | Noted: 2017-07-03

## 2019-02-06 PROBLEM — K59.00 CONSTIPATION, UNSPECIFIED: Chronic | Status: ACTIVE | Noted: 2017-07-03

## 2019-02-06 PROBLEM — R60.9 EDEMA, UNSPECIFIED: Chronic | Status: ACTIVE | Noted: 2017-07-03

## 2019-02-06 PROBLEM — Z22.322 CARRIER OR SUSPECTED CARRIER OF METHICILLIN RESISTANT STAPHYLOCOCCUS AUREUS: Chronic | Status: ACTIVE | Noted: 2017-07-03

## 2019-02-06 PROBLEM — E11.9 TYPE 2 DIABETES MELLITUS WITHOUT COMPLICATIONS: Chronic | Status: ACTIVE | Noted: 2017-07-03

## 2019-02-06 LAB
ALBUMIN SERPL ELPH-MCNC: 3.7 G/DL — SIGNIFICANT CHANGE UP (ref 3.5–5)
ALP SERPL-CCNC: 99 U/L — SIGNIFICANT CHANGE UP (ref 40–120)
ALT FLD-CCNC: 25 U/L DA — SIGNIFICANT CHANGE UP (ref 10–60)
ANION GAP SERPL CALC-SCNC: 9 MMOL/L — SIGNIFICANT CHANGE UP (ref 5–17)
APTT BLD: 25.7 SEC — LOW (ref 27.5–36.3)
AST SERPL-CCNC: 13 U/L — SIGNIFICANT CHANGE UP (ref 10–40)
BASOPHILS # BLD AUTO: 0.1 K/UL — SIGNIFICANT CHANGE UP (ref 0–0.2)
BASOPHILS NFR BLD AUTO: 0.4 % — SIGNIFICANT CHANGE UP (ref 0–2)
BILIRUB SERPL-MCNC: 0.7 MG/DL — SIGNIFICANT CHANGE UP (ref 0.2–1.2)
BUN SERPL-MCNC: 17 MG/DL — SIGNIFICANT CHANGE UP (ref 7–18)
CALCIUM SERPL-MCNC: 9 MG/DL — SIGNIFICANT CHANGE UP (ref 8.4–10.5)
CHLORIDE SERPL-SCNC: 106 MMOL/L — SIGNIFICANT CHANGE UP (ref 96–108)
CO2 SERPL-SCNC: 24 MMOL/L — SIGNIFICANT CHANGE UP (ref 22–31)
CREAT SERPL-MCNC: 0.64 MG/DL — SIGNIFICANT CHANGE UP (ref 0.5–1.3)
EOSINOPHIL # BLD AUTO: 0 K/UL — SIGNIFICANT CHANGE UP (ref 0–0.5)
EOSINOPHIL NFR BLD AUTO: 0 % — SIGNIFICANT CHANGE UP (ref 0–6)
GLUCOSE SERPL-MCNC: 130 MG/DL — HIGH (ref 70–99)
HCT VFR BLD CALC: 46.5 % — HIGH (ref 34.5–45)
HGB BLD-MCNC: 14.1 G/DL — SIGNIFICANT CHANGE UP (ref 11.5–15.5)
INR BLD: 1.07 RATIO — SIGNIFICANT CHANGE UP (ref 0.88–1.16)
LYMPHOCYTES # BLD AUTO: 0.5 K/UL — LOW (ref 1–3.3)
LYMPHOCYTES # BLD AUTO: 3.6 % — LOW (ref 13–44)
MCHC RBC-ENTMCNC: 30.2 PG — SIGNIFICANT CHANGE UP (ref 27–34)
MCHC RBC-ENTMCNC: 30.4 GM/DL — LOW (ref 32–36)
MCV RBC AUTO: 99.5 FL — SIGNIFICANT CHANGE UP (ref 80–100)
MONOCYTES # BLD AUTO: 0.3 K/UL — SIGNIFICANT CHANGE UP (ref 0–0.9)
MONOCYTES NFR BLD AUTO: 2 % — SIGNIFICANT CHANGE UP (ref 2–14)
NEUTROPHILS # BLD AUTO: 13.6 K/UL — HIGH (ref 1.8–7.4)
NEUTROPHILS NFR BLD AUTO: 93.9 % — HIGH (ref 43–77)
PLATELET # BLD AUTO: 323 K/UL — SIGNIFICANT CHANGE UP (ref 150–400)
POTASSIUM SERPL-MCNC: 4.3 MMOL/L — SIGNIFICANT CHANGE UP (ref 3.5–5.3)
POTASSIUM SERPL-SCNC: 4.3 MMOL/L — SIGNIFICANT CHANGE UP (ref 3.5–5.3)
PROT SERPL-MCNC: 7 G/DL — SIGNIFICANT CHANGE UP (ref 6–8.3)
PROTHROM AB SERPL-ACNC: 11.9 SEC — SIGNIFICANT CHANGE UP (ref 10–12.9)
RBC # BLD: 4.68 M/UL — SIGNIFICANT CHANGE UP (ref 3.8–5.2)
RBC # FLD: 12 % — SIGNIFICANT CHANGE UP (ref 10.3–14.5)
SODIUM SERPL-SCNC: 139 MMOL/L — SIGNIFICANT CHANGE UP (ref 135–145)
WBC # BLD: 14.5 K/UL — HIGH (ref 3.8–10.5)
WBC # FLD AUTO: 14.5 K/UL — HIGH (ref 3.8–10.5)

## 2019-02-06 PROCEDURE — 85610 PROTHROMBIN TIME: CPT

## 2019-02-06 PROCEDURE — 70450 CT HEAD/BRAIN W/O DYE: CPT | Mod: 26

## 2019-02-06 PROCEDURE — 70450 CT HEAD/BRAIN W/O DYE: CPT

## 2019-02-06 PROCEDURE — 99284 EMERGENCY DEPT VISIT MOD MDM: CPT

## 2019-02-06 PROCEDURE — 96374 THER/PROPH/DIAG INJ IV PUSH: CPT

## 2019-02-06 PROCEDURE — 36415 COLL VENOUS BLD VENIPUNCTURE: CPT

## 2019-02-06 PROCEDURE — 85027 COMPLETE CBC AUTOMATED: CPT

## 2019-02-06 PROCEDURE — 80053 COMPREHEN METABOLIC PANEL: CPT

## 2019-02-06 PROCEDURE — 85730 THROMBOPLASTIN TIME PARTIAL: CPT

## 2019-02-06 PROCEDURE — 99284 EMERGENCY DEPT VISIT MOD MDM: CPT | Mod: 25

## 2019-02-06 PROCEDURE — 96375 TX/PRO/DX INJ NEW DRUG ADDON: CPT

## 2019-02-06 RX ORDER — DIPHENHYDRAMINE HCL 50 MG
50 CAPSULE ORAL ONCE
Qty: 0 | Refills: 0 | Status: COMPLETED | OUTPATIENT
Start: 2019-02-06 | End: 2019-02-06

## 2019-02-06 RX ORDER — METOCLOPRAMIDE HCL 10 MG
10 TABLET ORAL ONCE
Qty: 0 | Refills: 0 | Status: COMPLETED | OUTPATIENT
Start: 2019-02-06 | End: 2019-02-06

## 2019-02-06 RX ORDER — SODIUM CHLORIDE 9 MG/ML
1000 INJECTION INTRAMUSCULAR; INTRAVENOUS; SUBCUTANEOUS ONCE
Qty: 0 | Refills: 0 | Status: COMPLETED | OUTPATIENT
Start: 2019-02-06 | End: 2019-02-06

## 2019-02-06 RX ADMIN — SODIUM CHLORIDE 1000 MILLILITER(S): 9 INJECTION INTRAMUSCULAR; INTRAVENOUS; SUBCUTANEOUS at 11:00

## 2019-02-06 RX ADMIN — SODIUM CHLORIDE 1000 MILLILITER(S): 9 INJECTION INTRAMUSCULAR; INTRAVENOUS; SUBCUTANEOUS at 10:15

## 2019-02-06 RX ADMIN — Medication 50 MILLIGRAM(S): at 10:15

## 2019-02-06 RX ADMIN — Medication 10 MILLIGRAM(S): at 10:15

## 2019-02-06 NOTE — ED PROVIDER NOTE - PROGRESS NOTE DETAILS
patient ct negative for bleed, discussed verbally with radiologist. patient headache improved. remains neurologically intact. patient endorses wanting trial of abx for sinusitis. patient instructed to f.u pmd and return for new or worsening symptoms. patient ct negative for bleed, discussed verbally with radiologist. patient headache improved. remains neurologically intact, afebrile, no nuchalk rigidity. patient endorses wanting trial of abx for sinusitis. patient instructed to f.u pmd and return for new or worsening symptoms.

## 2019-02-06 NOTE — ED PROVIDER NOTE - MEDICAL DECISION MAKING DETAILS
77 y/o F pt w/ headache x 3 days, afebrile, unlikely meningitis, of gradual onset, likely subarachnoid, CT Head, treat headache, and reassess

## 2019-02-06 NOTE — ED ADULT NURSE NOTE - CHPI ED NUR SYMPTOMS NEG
no blurred vision/no change in level of consciousness/no confusion/no dizziness/no loss of consciousness/no fever/no numbness/no weakness

## 2019-02-06 NOTE — ED ADULT NURSE NOTE - NSIMPLEMENTINTERV_GEN_ALL_ED
Implemented All Universal Safety Interventions:  [place] to call system. Call bell, personal items and telephone within reach. Instruct patient to call for assistance. Room bathroom lighting operational. Non-slip footwear when patient is off stretcher. Physically safe environment: no spills, clutter or unnecessary equipment. Stretcher in lowest position, wheels locked, appropriate side rails in place.

## 2019-02-06 NOTE — ED PROVIDER NOTE - PSH
[place] filter in place  2009  H/O sinus surgery  2/2017  H/O Spinal surgery  2009  S/P Appendectomy  1968  S/P Cholecystectomy  1968  S/P Hysterectomy  1987  S/P TKR (total knee replacement)  Left 2009  S/P TKR (Total Knee Replacement)  Right 2015

## 2019-02-06 NOTE — ED PROVIDER NOTE - OBJECTIVE STATEMENT
77 y/o F pt w/ PMHx of DM presents to ED c/o headache x 3 days. Pt reports headache is right sided, of gradual onset, and worsening. Pt also c/o nausea, vomiting x 1 day. Pt denies focal weakness or numbness, neck stiffness, fever and all other complaints. NKDA

## 2019-02-06 NOTE — ED ADULT NURSE NOTE - OBJECTIVE STATEMENT
Pt states has a headache on and off x 3 days,   last week  Denies any medical problems, doesn't take any meds, c/o nausea and vomiting

## 2020-06-24 ENCOUNTER — APPOINTMENT (OUTPATIENT)
Dept: SURGERY | Facility: CLINIC | Age: 80
End: 2020-06-24

## 2021-09-07 ENCOUNTER — INPATIENT (INPATIENT)
Facility: HOSPITAL | Age: 81
LOS: 6 days | Discharge: HOME | DRG: 871 | End: 2021-09-14
Attending: INTERNAL MEDICINE | Admitting: INTERNAL MEDICINE
Payer: MEDICARE

## 2021-09-07 VITALS
WEIGHT: 139.99 LBS | RESPIRATION RATE: 22 BRPM | HEART RATE: 88 BPM | DIASTOLIC BLOOD PRESSURE: 70 MMHG | HEIGHT: 62 IN | TEMPERATURE: 99 F | SYSTOLIC BLOOD PRESSURE: 143 MMHG | OXYGEN SATURATION: 93 %

## 2021-09-07 DIAGNOSIS — Z95.828 PRESENCE OF OTHER VASCULAR IMPLANTS AND GRAFTS: Chronic | ICD-10-CM

## 2021-09-07 DIAGNOSIS — Z98.890 OTHER SPECIFIED POSTPROCEDURAL STATES: Chronic | ICD-10-CM

## 2021-09-07 DIAGNOSIS — U07.1 COVID-19: ICD-10-CM

## 2021-09-07 DIAGNOSIS — Z96.659 PRESENCE OF UNSPECIFIED ARTIFICIAL KNEE JOINT: Chronic | ICD-10-CM

## 2021-09-07 LAB
ALBUMIN SERPL ELPH-MCNC: 2.8 G/DL — LOW (ref 3.3–5)
ALP SERPL-CCNC: 116 U/L — SIGNIFICANT CHANGE UP (ref 40–120)
ALT FLD-CCNC: 67 U/L — SIGNIFICANT CHANGE UP (ref 12–78)
ANION GAP SERPL CALC-SCNC: 5 MMOL/L — SIGNIFICANT CHANGE UP (ref 5–17)
APTT BLD: 34.1 SEC — SIGNIFICANT CHANGE UP (ref 27.5–35.5)
AST SERPL-CCNC: 49 U/L — HIGH (ref 15–37)
BASOPHILS # BLD AUTO: 0.02 K/UL — SIGNIFICANT CHANGE UP (ref 0–0.2)
BASOPHILS NFR BLD AUTO: 0.2 % — SIGNIFICANT CHANGE UP (ref 0–2)
BILIRUB SERPL-MCNC: 0.4 MG/DL — SIGNIFICANT CHANGE UP (ref 0.2–1.2)
BUN SERPL-MCNC: 16 MG/DL — SIGNIFICANT CHANGE UP (ref 7–23)
CALCIUM SERPL-MCNC: 8.8 MG/DL — SIGNIFICANT CHANGE UP (ref 8.5–10.1)
CHLORIDE SERPL-SCNC: 98 MMOL/L — SIGNIFICANT CHANGE UP (ref 96–108)
CO2 SERPL-SCNC: 33 MMOL/L — HIGH (ref 22–31)
CREAT SERPL-MCNC: 0.82 MG/DL — SIGNIFICANT CHANGE UP (ref 0.5–1.3)
EOSINOPHIL # BLD AUTO: 0.01 K/UL — SIGNIFICANT CHANGE UP (ref 0–0.5)
EOSINOPHIL NFR BLD AUTO: 0.1 % — SIGNIFICANT CHANGE UP (ref 0–6)
GLUCOSE SERPL-MCNC: 105 MG/DL — HIGH (ref 70–99)
HCT VFR BLD CALC: 45.5 % — HIGH (ref 34.5–45)
HGB BLD-MCNC: 14.7 G/DL — SIGNIFICANT CHANGE UP (ref 11.5–15.5)
IMM GRANULOCYTES NFR BLD AUTO: 0.5 % — SIGNIFICANT CHANGE UP (ref 0–1.5)
INR BLD: 1.13 RATIO — SIGNIFICANT CHANGE UP (ref 0.88–1.16)
LACTATE SERPL-SCNC: 1.1 MMOL/L — SIGNIFICANT CHANGE UP (ref 0.7–2)
LYMPHOCYTES # BLD AUTO: 0.93 K/UL — LOW (ref 1–3.3)
LYMPHOCYTES # BLD AUTO: 8.6 % — LOW (ref 13–44)
MCHC RBC-ENTMCNC: 30.6 PG — SIGNIFICANT CHANGE UP (ref 27–34)
MCHC RBC-ENTMCNC: 32.3 GM/DL — SIGNIFICANT CHANGE UP (ref 32–36)
MCV RBC AUTO: 94.6 FL — SIGNIFICANT CHANGE UP (ref 80–100)
MONOCYTES # BLD AUTO: 0.37 K/UL — SIGNIFICANT CHANGE UP (ref 0–0.9)
MONOCYTES NFR BLD AUTO: 3.4 % — SIGNIFICANT CHANGE UP (ref 2–14)
NEUTROPHILS # BLD AUTO: 9.38 K/UL — HIGH (ref 1.8–7.4)
NEUTROPHILS NFR BLD AUTO: 87.2 % — HIGH (ref 43–77)
NRBC # BLD: 0 /100 WBCS — SIGNIFICANT CHANGE UP (ref 0–0)
PLATELET # BLD AUTO: 351 K/UL — SIGNIFICANT CHANGE UP (ref 150–400)
POTASSIUM SERPL-MCNC: 4.5 MMOL/L — SIGNIFICANT CHANGE UP (ref 3.5–5.3)
POTASSIUM SERPL-SCNC: 4.5 MMOL/L — SIGNIFICANT CHANGE UP (ref 3.5–5.3)
PROT SERPL-MCNC: 7.2 G/DL — SIGNIFICANT CHANGE UP (ref 6–8.3)
PROTHROM AB SERPL-ACNC: 13.1 SEC — SIGNIFICANT CHANGE UP (ref 10.6–13.6)
RBC # BLD: 4.81 M/UL — SIGNIFICANT CHANGE UP (ref 3.8–5.2)
RBC # FLD: 13.2 % — SIGNIFICANT CHANGE UP (ref 10.3–14.5)
SARS-COV-2 RNA SPEC QL NAA+PROBE: DETECTED
SODIUM SERPL-SCNC: 136 MMOL/L — SIGNIFICANT CHANGE UP (ref 135–145)
WBC # BLD: 10.76 K/UL — HIGH (ref 3.8–10.5)
WBC # FLD AUTO: 10.76 K/UL — HIGH (ref 3.8–10.5)

## 2021-09-07 PROCEDURE — 99285 EMERGENCY DEPT VISIT HI MDM: CPT | Mod: CS

## 2021-09-07 PROCEDURE — 71045 X-RAY EXAM CHEST 1 VIEW: CPT | Mod: 26

## 2021-09-07 PROCEDURE — 93010 ELECTROCARDIOGRAM REPORT: CPT

## 2021-09-07 PROCEDURE — 99223 1ST HOSP IP/OBS HIGH 75: CPT

## 2021-09-07 RX ORDER — POTASSIUM CHLORIDE 20 MEQ
0 PACKET (EA) ORAL
Qty: 0 | Refills: 0 | DISCHARGE

## 2021-09-07 RX ORDER — LEVOTHYROXINE SODIUM 125 MCG
100 TABLET ORAL DAILY
Refills: 0 | Status: DISCONTINUED | OUTPATIENT
Start: 2021-09-07 | End: 2021-09-14

## 2021-09-07 RX ORDER — ONDANSETRON 8 MG/1
4 TABLET, FILM COATED ORAL ONCE
Refills: 0 | Status: COMPLETED | OUTPATIENT
Start: 2021-09-07 | End: 2021-09-07

## 2021-09-07 RX ORDER — LINACLOTIDE 145 UG/1
0 CAPSULE, GELATIN COATED ORAL
Qty: 0 | Refills: 0 | DISCHARGE

## 2021-09-07 RX ORDER — METFORMIN HYDROCHLORIDE 850 MG/1
1000 TABLET ORAL
Qty: 0 | Refills: 0 | DISCHARGE

## 2021-09-07 RX ORDER — LUBIPROSTONE 24 UG/1
1 CAPSULE, GELATIN COATED ORAL
Qty: 0 | Refills: 0 | DISCHARGE

## 2021-09-07 RX ORDER — ACETAMINOPHEN 500 MG
650 TABLET ORAL EVERY 6 HOURS
Refills: 0 | Status: DISCONTINUED | OUTPATIENT
Start: 2021-09-07 | End: 2021-09-14

## 2021-09-07 RX ORDER — ENOXAPARIN SODIUM 100 MG/ML
40 INJECTION SUBCUTANEOUS DAILY
Refills: 0 | Status: DISCONTINUED | OUTPATIENT
Start: 2021-09-07 | End: 2021-09-14

## 2021-09-07 RX ORDER — DEXAMETHASONE 0.5 MG/5ML
6 ELIXIR ORAL ONCE
Refills: 0 | Status: COMPLETED | OUTPATIENT
Start: 2021-09-07 | End: 2021-09-07

## 2021-09-07 RX ORDER — SENNA PLUS 8.6 MG/1
5 TABLET ORAL
Qty: 0 | Refills: 0 | DISCHARGE

## 2021-09-07 RX ORDER — SODIUM CHLORIDE 9 MG/ML
1000 INJECTION INTRAMUSCULAR; INTRAVENOUS; SUBCUTANEOUS ONCE
Refills: 0 | Status: COMPLETED | OUTPATIENT
Start: 2021-09-07 | End: 2021-09-07

## 2021-09-07 RX ORDER — FUROSEMIDE 40 MG
1 TABLET ORAL
Qty: 0 | Refills: 0 | DISCHARGE

## 2021-09-07 RX ORDER — L.ACIDOPH/B.ANIMALIS/B.LONGUM 15B CELL
1 CAPSULE ORAL
Qty: 0 | Refills: 0 | DISCHARGE

## 2021-09-07 RX ADMIN — ENOXAPARIN SODIUM 40 MILLIGRAM(S): 100 INJECTION SUBCUTANEOUS at 13:05

## 2021-09-07 RX ADMIN — Medication 650 MILLIGRAM(S): at 21:18

## 2021-09-07 RX ADMIN — Medication 650 MILLIGRAM(S): at 22:47

## 2021-09-07 RX ADMIN — SODIUM CHLORIDE 1000 MILLILITER(S): 9 INJECTION INTRAMUSCULAR; INTRAVENOUS; SUBCUTANEOUS at 05:17

## 2021-09-07 RX ADMIN — Medication 6 MILLIGRAM(S): at 05:17

## 2021-09-07 RX ADMIN — ONDANSETRON 4 MILLIGRAM(S): 8 TABLET, FILM COATED ORAL at 05:17

## 2021-09-07 RX ADMIN — SODIUM CHLORIDE 1000 MILLILITER(S): 9 INJECTION INTRAMUSCULAR; INTRAVENOUS; SUBCUTANEOUS at 06:17

## 2021-09-07 RX ADMIN — Medication 650 MILLIGRAM(S): at 13:05

## 2021-09-07 NOTE — ED ADULT NURSE NOTE - NSICDXPASTSURGICALHX_GEN_ALL_CORE_FT
PAST SURGICAL HISTORY:  [place] filter in place 2009    H/O sinus surgery 2/2017    H/O Spinal surgery 2009    S/P Appendectomy 1968    S/P Cholecystectomy 1968    S/P Hysterectomy 1987    S/P TKR (Total Knee Replacement) Right 2015    S/P TKR (total knee replacement) Left 2009

## 2021-09-07 NOTE — ED ADULT NURSE NOTE - NSICDXPASTMEDICALHX_GEN_ALL_CORE_FT
PAST MEDICAL HISTORY:  Cataract     Chronic Back Pain     Constipation     Diabetes     Edema     Hypothyroidism     Lumbar back pain     MRSA colonization     Osteoarthritis     Spastic colon

## 2021-09-07 NOTE — H&P ADULT - NSHPPHYSICALEXAM_GEN_ALL_CORE
T(C): 37.2 (09-07-21 @ 07:45), Max: 37.4 (09-07-21 @ 04:02)  HR: 78 (09-07-21 @ 07:45) (77 - 88)  BP: 141/77 (09-07-21 @ 07:45) (141/77 - 151/76)  RR: 16 (09-07-21 @ 07:45) (16 - 22)  SpO2: 96% (09-07-21 @ 07:45) (91% - 97%)  Wt(kg): --    Physical Exam:   GENERAL: well-groomed, well-developed, NAD  HEENT: head NC/AT; EOM intact, conjunctiva & sclera clear; hearing grossly intact, moist mucous membranes  NECK: supple, no JVD  RESPIRATORY: rhonchi b/l lung bases, no rales or wheezing  CARDIOVASCULAR: S1&S2, RRR, no murmurs or gallops  ABDOMEN: soft, non-tender, non-distended, + Bowel sounds x4 quadrants, no guarding, rebound or rigidity  MUSCULOSKELETAL:  no clubbing, cyanosis or edema of all 4 extremities  LYMPH: no cervical lymphadenopathy  VASCULAR: Radial pulses 2+ bilaterally, no varicose veins   SKIN: warm and dry, color normal  NEUROLOGIC: AA&O X3, CN2-12 intact w/ no focal deficits, no sensory loss, motor Strength 5/5 in UE & LE B/L  Psych: Normal mood and affect, normal behavior

## 2021-09-07 NOTE — H&P ADULT - ASSESSMENT
Ms Ferrer is a 82 yo F presenting from home with SOB with a recent COVID-19 exposure [her son]. PMH Hypothyroidism, Irritable Bowel Syndrome      NOTE IN PROGRESS.   Hypothyroidism: Continue Levothyroxine.     Irritable Bowel Syndrome: no active symptoms or flare.   -continue to monitor    DVT ppx: Lovenox Ms Ferrer is a 82 yo F presenting from home with SOB found to have COVID-19 infection. PMH Hypothyroidism, Irritable Bowel Syndrome    COVID-19 Infection: Patient is not currently hypoxic, saturating well on 2L nasal cannula. Patient was not hypoxic. Will wean off oxygen and monitor on continuous pulse oximeter.  -at this time no role for Remdesivir or Decadron.   -CXR shows Right upper lobe infiltrate. Thoracolumbar hardware. No indication for abx as this is a viral process. Do not suspect bacterial superinfection at this time.   -remainder as above.      Hypothyroidism: Continue Levothyroxine.     Irritable Bowel Syndrome: no active symptoms or flare.   -continue to monitor    DVT ppx: Lovenox

## 2021-09-07 NOTE — ED ADULT NURSE NOTE - OBJECTIVE STATEMENT
Patient A/Ox4. C/o fever and vomiting. Son recently had Covid. Patient placed on telemetry monitor and in isolation room. Call light in reach. 2L NC placed as patient was 90% on room air.

## 2021-09-07 NOTE — H&P ADULT - NSHPROSALLOTHERNEGRD_GEN_ALL_CORE
All other review of systems negative, except as noted in HPI Skin Substitute Units (Will Override Primary Defect Units If Greater Than 0): 0

## 2021-09-07 NOTE — H&P ADULT - HISTORY OF PRESENT ILLNESS
Ms Ferrer is a 82 yo F presenting from home with SOB with a recent COVID-19 exposure [her son]. PMH Hypothyroidism, Irritable Bowel Syndrome  Patient seen and examined at bedside. She reports having SOB and cough for about 4 days now. Her son was COVID-19 positive recently and was deemed "not contagious" as of August 29. Patient developed SOB and non-productive cough about 5 days ago, she also had a fever of 101 F yesterday. She is not vaccinated against COVID-19 because she has many allergies and feels she will be allergic to vaccine components. Denies chills, light-headedness, dizziness, headaches, nausea, vomiting, chest pain, palpitations, abdominal pain, constipation, diarrhea, melena, hematochezia, dysuria.   Patients COVID-19 PCR is still pending from the ER. CXR shows elevated R hemidiaphragm, R upper perihilar infiltrate [?] [official read pending]

## 2021-09-07 NOTE — ED PROVIDER NOTE - NSICDXPASTSURGICALHX_GEN_ALL_CORE_FT
PAST SURGICAL HISTORY:  [place] filter in place 2009    H/O sinus surgery 2/2017    H/O Spinal surgery 2009    S/P Appendectomy 1968    S/P Cholecystectomy 1968    S/P Hysterectomy 1987    S/P TKR (Total Knee Replacement) Right 2015    S/P TKR (total knee replacement) Left 2009

## 2021-09-07 NOTE — H&P ADULT - NSICDXPASTSURGICALHX_GEN_ALL_CORE_FT
PAST SURGICAL HISTORY:  [place] filter in place 2009    H/O sinus surgery 2/2017    H/O Spinal surgery 2009    S/P Appendectomy 1968    S/P Cholecystectomy 1968    S/P Hysterectomy 1987    S/P TKR (Total Knee Replacement) Right 2015    S/P TKR (total knee replacement) Left 2009

## 2021-09-07 NOTE — H&P ADULT - NSHPREVIEWOFSYSTEMS_GEN_ALL_CORE
CONSTITUTIONAL: denies chills, admits fatigue and weakness  HEENT: denies, sore throat  CARDIOVASCULAR: denies chest pain, chest pressure, palpitations  RESPIRATORY: see HPI  GASTROINTESTINAL: denies nausea, vomiting, diarrhea, abdominal pain, melena, hematochezia  GENITOURINARY: denies dysuria  NEUROLOGICAL: denies numbness, headache, focal weakness  LYMPHATICS: denies  extremity swelling

## 2021-09-07 NOTE — ED PROVIDER NOTE - OBJECTIVE STATEMENT
82yo female who presents with cough, fever for the last 4-5 days. pt  states her son had covid several weeks ago and was cleared on aug 29 and pt has been sick for the last 4-5 days, dry cough, fever tonite of 101 and vomiting no sob, pt denies any other complaints, pt is not vaccinated due to her multiple allergies

## 2021-09-07 NOTE — ED PROVIDER NOTE - CARE PLAN
Principal Discharge DX:	2019 novel coronavirus disease (COVID-19)  Secondary Diagnosis:	Hypoxemia requiring supplemental oxygen   1

## 2021-09-08 LAB
APTT BLD: 40.6 SEC — HIGH (ref 27.5–35.5)
BASOPHILS # BLD AUTO: 0.01 K/UL — SIGNIFICANT CHANGE UP (ref 0–0.2)
BASOPHILS NFR BLD AUTO: 0.1 % — SIGNIFICANT CHANGE UP (ref 0–2)
EOSINOPHIL # BLD AUTO: 0.01 K/UL — SIGNIFICANT CHANGE UP (ref 0–0.5)
EOSINOPHIL NFR BLD AUTO: 0.1 % — SIGNIFICANT CHANGE UP (ref 0–6)
HCT VFR BLD CALC: 41.8 % — SIGNIFICANT CHANGE UP (ref 34.5–45)
HGB BLD-MCNC: 13.7 G/DL — SIGNIFICANT CHANGE UP (ref 11.5–15.5)
IMM GRANULOCYTES NFR BLD AUTO: 0.5 % — SIGNIFICANT CHANGE UP (ref 0–1.5)
INR BLD: 1.4 RATIO — HIGH (ref 0.88–1.16)
LYMPHOCYTES # BLD AUTO: 0.87 K/UL — LOW (ref 1–3.3)
LYMPHOCYTES # BLD AUTO: 11.7 % — LOW (ref 13–44)
MCHC RBC-ENTMCNC: 30.6 PG — SIGNIFICANT CHANGE UP (ref 27–34)
MCHC RBC-ENTMCNC: 32.8 GM/DL — SIGNIFICANT CHANGE UP (ref 32–36)
MCV RBC AUTO: 93.3 FL — SIGNIFICANT CHANGE UP (ref 80–100)
MONOCYTES # BLD AUTO: 0.41 K/UL — SIGNIFICANT CHANGE UP (ref 0–0.9)
MONOCYTES NFR BLD AUTO: 5.5 % — SIGNIFICANT CHANGE UP (ref 2–14)
NEUTROPHILS # BLD AUTO: 6.11 K/UL — SIGNIFICANT CHANGE UP (ref 1.8–7.4)
NEUTROPHILS NFR BLD AUTO: 82.1 % — HIGH (ref 43–77)
NRBC # BLD: 0 /100 WBCS — SIGNIFICANT CHANGE UP (ref 0–0)
PLATELET # BLD AUTO: 297 K/UL — SIGNIFICANT CHANGE UP (ref 150–400)
PROTHROM AB SERPL-ACNC: 16.1 SEC — HIGH (ref 10.6–13.6)
RBC # BLD: 4.48 M/UL — SIGNIFICANT CHANGE UP (ref 3.8–5.2)
RBC # FLD: 13.2 % — SIGNIFICANT CHANGE UP (ref 10.3–14.5)
WBC # BLD: 7.45 K/UL — SIGNIFICANT CHANGE UP (ref 3.8–10.5)
WBC # FLD AUTO: 7.45 K/UL — SIGNIFICANT CHANGE UP (ref 3.8–10.5)

## 2021-09-08 PROCEDURE — 99233 SBSQ HOSP IP/OBS HIGH 50: CPT

## 2021-09-08 RX ORDER — ONDANSETRON 8 MG/1
4 TABLET, FILM COATED ORAL ONCE
Refills: 0 | Status: COMPLETED | OUTPATIENT
Start: 2021-09-08 | End: 2021-09-08

## 2021-09-08 RX ORDER — ONDANSETRON 8 MG/1
4 TABLET, FILM COATED ORAL EVERY 8 HOURS
Refills: 0 | Status: DISCONTINUED | OUTPATIENT
Start: 2021-09-08 | End: 2021-09-14

## 2021-09-08 RX ORDER — PANTOPRAZOLE SODIUM 20 MG/1
40 TABLET, DELAYED RELEASE ORAL
Refills: 0 | Status: DISCONTINUED | OUTPATIENT
Start: 2021-09-08 | End: 2021-09-14

## 2021-09-08 RX ORDER — REMDESIVIR 5 MG/ML
INJECTION INTRAVENOUS
Refills: 0 | Status: COMPLETED | OUTPATIENT
Start: 2021-09-08 | End: 2021-09-12

## 2021-09-08 RX ORDER — LINACLOTIDE 145 UG/1
290 CAPSULE, GELATIN COATED ORAL
Refills: 0 | Status: DISCONTINUED | OUTPATIENT
Start: 2021-09-09 | End: 2021-09-14

## 2021-09-08 RX ORDER — REMDESIVIR 5 MG/ML
200 INJECTION INTRAVENOUS EVERY 24 HOURS
Refills: 0 | Status: COMPLETED | OUTPATIENT
Start: 2021-09-08 | End: 2021-09-08

## 2021-09-08 RX ORDER — REMDESIVIR 5 MG/ML
100 INJECTION INTRAVENOUS EVERY 24 HOURS
Refills: 0 | Status: COMPLETED | OUTPATIENT
Start: 2021-09-09 | End: 2021-09-12

## 2021-09-08 RX ORDER — DEXAMETHASONE 0.5 MG/5ML
6 ELIXIR ORAL DAILY
Refills: 0 | Status: DISCONTINUED | OUTPATIENT
Start: 2021-09-08 | End: 2021-09-09

## 2021-09-08 RX ADMIN — Medication 100 MICROGRAM(S): at 05:22

## 2021-09-08 RX ADMIN — ENOXAPARIN SODIUM 40 MILLIGRAM(S): 100 INJECTION SUBCUTANEOUS at 13:14

## 2021-09-08 RX ADMIN — Medication 650 MILLIGRAM(S): at 05:22

## 2021-09-08 RX ADMIN — Medication 6 MILLIGRAM(S): at 13:13

## 2021-09-08 RX ADMIN — REMDESIVIR 200 MILLIGRAM(S): 5 INJECTION INTRAVENOUS at 13:13

## 2021-09-08 RX ADMIN — ONDANSETRON 4 MILLIGRAM(S): 8 TABLET, FILM COATED ORAL at 05:21

## 2021-09-08 NOTE — PROGRESS NOTE ADULT - SUBJECTIVE AND OBJECTIVE BOX
Patient is a 81y old  Female who presents with a chief complaint of SOB, Hypoxia, suspected COVID-19 infection (07 Sep 2021 08:38)      INTERVAL HPI: Pt seen and examined. States she still has some sob, dry throat and cough. Has not had a BM and has not received home dose of linzess. Has some b/l knee achiness which she typically gets when she is ill. Denies any other acute complaints at this time. Pt still does not want to vaccinate herself when recovers because she states she has allergies and would want to wait out the three year approval period. States her allergy doctor is hesitant about vaccination as well, this writer reinforced importance of mortality reduction with vaccinated vs nonvaccinated however pt remains reserved on getting vaccinated.     OVERNIGHT EVENTS: none noted  T(F): 100.5 (09-08-21 @ 05:19), Max: 102.8 (09-07-21 @ 21:17)  HR: 99 (09-08-21 @ 05:19) (77 - 99)  BP: 122/66 (09-08-21 @ 05:19) (122/66 - 153/67)  RR: 18 (09-08-21 @ 05:19) (16 - 18)  SpO2: 93% (09-08-21 @ 05:19) (90% - 97%)  I&O's Summary      REVIEW OF SYSTEMS:  comprehensive ros negative except as above in hpi     PHYSICAL EXAM:  GENERAL: NAD, thin, elder  HEAD:  Atraumatic, Normocephalic  EYES: EOMI, PERRLA, conjunctiva and sclera clear  ENMT: No tonsillar erythema, exudates, or enlargement; Moist mucous membranes, Good dentition, No lesions  NERVOUS SYSTEM:  Alert & Oriented X3, Good concentration; Motor Strength 4/5 B/L upper and lower extremities  CHEST/LUNG: diminished b/l bases on auscultation, some sob with extended conversations  HEART: Regular rate and rhythm; No murmurs, rubs, or gallops  ABDOMEN: Soft, Nontender, Nondistended; Bowel sounds present  EXTREMITIES:  2+ Peripheral Pulses, No clubbing, cyanosis, or edema  SKIN: No rashes or lesions    LABS:                        14.7   10.76 )-----------( 351      ( 07 Sep 2021 05:19 )             45.5     09-07    136  |  98  |  16  ----------------------------<  105<H>  4.5   |  33<H>  |  0.82    Ca    8.8      07 Sep 2021 05:19    TPro  7.2  /  Alb  2.8<L>  /  TBili  0.4  /  DBili  x   /  AST  49<H>  /  ALT  67  /  AlkPhos  116  09-07    PT/INR - ( 07 Sep 2021 05:19 )   PT: 13.1 sec;   INR: 1.13 ratio         PTT - ( 07 Sep 2021 05:19 )  PTT:34.1 sec    CAPILLARY BLOOD GLUCOSE      POCT Blood Glucose.: 84 mg/dL (08 Sep 2021 07:34)  POCT Blood Glucose.: 104 mg/dL (07 Sep 2021 21:45)      09-07 @ 08:53   No growth to date.  --  --          MEDICATIONS  (STANDING):  enoxaparin Injectable 40 milliGRAM(s) SubCutaneous daily  levothyroxine 100 MICROGram(s) Oral daily    MEDICATIONS  (PRN):  acetaminophen   Tablet .. 650 milliGRAM(s) Oral every 6 hours PRN Temp greater or equal to 38.5C (101.3F), Mild Pain (1 - 3)       Patient is a 81y old  Female who presents with a chief complaint of SOB, Hypoxia, suspected COVID-19 infection (07 Sep 2021 08:38)      INTERVAL HPI: Pt seen and examined. States she still has some sob, dry throat and cough. Has not had a BM and has not received home dose of linzess. Has some b/l knee achiness which she typically gets when she is ill. Denies any other acute complaints at this time. Pt still does not want to vaccinate herself when recovers because she states she has allergies and would want to wait out the three year approval period. States her allergy doctor is hesitant about vaccination as well, this writer reinforced importance of mortality reduction with vaccinated vs nonvaccinated however pt remains reserved on getting vaccinated.     RN weaned this AM and pt desaturated to 85% on room air at rest.     OVERNIGHT EVENTS: none noted  T(F): 100.5 (09-08-21 @ 05:19), Max: 102.8 (09-07-21 @ 21:17)  HR: 99 (09-08-21 @ 05:19) (77 - 99)  BP: 122/66 (09-08-21 @ 05:19) (122/66 - 153/67)  RR: 18 (09-08-21 @ 05:19) (16 - 18)  SpO2: 93% (09-08-21 @ 05:19) (90% - 97%)  I&O's Summary      REVIEW OF SYSTEMS:  comprehensive ros negative except as above in hpi     PHYSICAL EXAM:  GENERAL: NAD, thin, elder  HEAD:  Atraumatic, Normocephalic  EYES: EOMI, PERRLA, conjunctiva and sclera clear  ENMT: No tonsillar erythema, exudates, or enlargement; Moist mucous membranes, Good dentition, No lesions  NERVOUS SYSTEM:  Alert & Oriented X3, Good concentration; Motor Strength 4/5 B/L upper and lower extremities  CHEST/LUNG: diminished b/l bases on auscultation, some sob with extended conversations  HEART: Regular rate and rhythm; No murmurs, rubs, or gallops  ABDOMEN: Soft, Nontender, Nondistended; Bowel sounds present  EXTREMITIES:  2+ Peripheral Pulses, No clubbing, cyanosis, or edema  SKIN: No rashes or lesions    LABS:                        14.7   10.76 )-----------( 351      ( 07 Sep 2021 05:19 )             45.5     09-07    136  |  98  |  16  ----------------------------<  105<H>  4.5   |  33<H>  |  0.82    Ca    8.8      07 Sep 2021 05:19    TPro  7.2  /  Alb  2.8<L>  /  TBili  0.4  /  DBili  x   /  AST  49<H>  /  ALT  67  /  AlkPhos  116  09-07    PT/INR - ( 07 Sep 2021 05:19 )   PT: 13.1 sec;   INR: 1.13 ratio         PTT - ( 07 Sep 2021 05:19 )  PTT:34.1 sec    CAPILLARY BLOOD GLUCOSE      POCT Blood Glucose.: 84 mg/dL (08 Sep 2021 07:34)  POCT Blood Glucose.: 104 mg/dL (07 Sep 2021 21:45)      09-07 @ 08:53   No growth to date.  --  --          MEDICATIONS  (STANDING):  enoxaparin Injectable 40 milliGRAM(s) SubCutaneous daily  levothyroxine 100 MICROGram(s) Oral daily    MEDICATIONS  (PRN):  acetaminophen   Tablet .. 650 milliGRAM(s) Oral every 6 hours PRN Temp greater or equal to 38.5C (101.3F), Mild Pain (1 - 3)

## 2021-09-08 NOTE — PROGRESS NOTE ADULT - ASSESSMENT
Ms Ferrer is a 82 yo F presenting from home with SOB found to have COVID-19 infection. PMH Hypothyroidism, Irritable Bowel Syndrome    COVID-19 Infection: Patient is not currently hypoxic, saturating well on 2L nasal cannula. Patient was not hypoxic. Will wean off oxygen and monitor on continuous pulse oximeter.  -at this time no role for Remdesivir or Decadron, may consider now starting pending on wean from O2, will f/u with RN, will consider ID and pulm consult if desats  -CXR shows Right upper lobe infiltrate. Thoracolumbar hardware. No indication for abx as this is a viral process. Do not suspect bacterial superinfection at this time.   -remainder as above.      Hypothyroidism:   chronic stable  Continue Levothyroxine.     Irritable Bowel Syndrome: no active symptoms or flare.   -restart linzess, home dose, pt to bring in her own  -continue to monitor    DVT ppx: Lovenox Ms Ferrer is a 80 yo F presenting from home with SOB found to have COVID-19 infection. PMH Hypothyroidism, Irritable Bowel Syndrome    COVID-19 Infection: Sepsis criteria met evolving from admission, with acute respiratory failure with hypoxia  -pt is day 6 from onset of symptoms  -NLR pending labs today, yest was 10.08  -start remdesmivir and decadron  -apprec ID recs Dr Pratt  -Dr Cedeno pulm  -CXR shows Right upper lobe infiltrate. Thoracolumbar hardware. No indication for abx as this is a viral process. Do not suspect bacterial superinfection at this time.   -remainder as above.      Hypothyroidism:   chronic stable  Continue Levothyroxine.     Irritable Bowel Syndrome: no active symptoms or flare.   -restart linzess, home dose, pt to bring in her own  -continue to monitor    DVT ppx: Lovenox

## 2021-09-09 LAB
ALBUMIN SERPL ELPH-MCNC: 2.1 G/DL — LOW (ref 3.3–5)
ALP SERPL-CCNC: 110 U/L — SIGNIFICANT CHANGE UP (ref 40–120)
ALT FLD-CCNC: 172 U/L — HIGH (ref 12–78)
ANION GAP SERPL CALC-SCNC: 9 MMOL/L — SIGNIFICANT CHANGE UP (ref 5–17)
AST SERPL-CCNC: 121 U/L — HIGH (ref 15–37)
BASOPHILS # BLD AUTO: 0.01 K/UL — SIGNIFICANT CHANGE UP (ref 0–0.2)
BASOPHILS NFR BLD AUTO: 0.1 % — SIGNIFICANT CHANGE UP (ref 0–2)
BILIRUB SERPL-MCNC: 0.4 MG/DL — SIGNIFICANT CHANGE UP (ref 0.2–1.2)
BUN SERPL-MCNC: 28 MG/DL — HIGH (ref 7–23)
CALCIUM SERPL-MCNC: 8.5 MG/DL — SIGNIFICANT CHANGE UP (ref 8.5–10.1)
CHLORIDE SERPL-SCNC: 104 MMOL/L — SIGNIFICANT CHANGE UP (ref 96–108)
CO2 SERPL-SCNC: 20 MMOL/L — LOW (ref 22–31)
CREAT SERPL-MCNC: 0.81 MG/DL — SIGNIFICANT CHANGE UP (ref 0.5–1.3)
D DIMER BLD IA.RAPID-MCNC: 231 NG/ML DDU — HIGH
EOSINOPHIL # BLD AUTO: 0 K/UL — SIGNIFICANT CHANGE UP (ref 0–0.5)
EOSINOPHIL NFR BLD AUTO: 0 % — SIGNIFICANT CHANGE UP (ref 0–6)
FERRITIN SERPL-MCNC: 812 NG/ML — HIGH (ref 15–150)
GLUCOSE SERPL-MCNC: 208 MG/DL — HIGH (ref 70–99)
HCT VFR BLD CALC: 42.5 % — SIGNIFICANT CHANGE UP (ref 34.5–45)
HGB BLD-MCNC: 14.1 G/DL — SIGNIFICANT CHANGE UP (ref 11.5–15.5)
IMM GRANULOCYTES NFR BLD AUTO: 0.3 % — SIGNIFICANT CHANGE UP (ref 0–1.5)
INR BLD: 1.28 RATIO — HIGH (ref 0.88–1.16)
LYMPHOCYTES # BLD AUTO: 0.79 K/UL — LOW (ref 1–3.3)
LYMPHOCYTES # BLD AUTO: 6.9 % — LOW (ref 13–44)
MCHC RBC-ENTMCNC: 30.7 PG — SIGNIFICANT CHANGE UP (ref 27–34)
MCHC RBC-ENTMCNC: 33.2 GM/DL — SIGNIFICANT CHANGE UP (ref 32–36)
MCV RBC AUTO: 92.4 FL — SIGNIFICANT CHANGE UP (ref 80–100)
MONOCYTES # BLD AUTO: 0.46 K/UL — SIGNIFICANT CHANGE UP (ref 0–0.9)
MONOCYTES NFR BLD AUTO: 4 % — SIGNIFICANT CHANGE UP (ref 2–14)
NEUTROPHILS # BLD AUTO: 10.23 K/UL — HIGH (ref 1.8–7.4)
NEUTROPHILS NFR BLD AUTO: 88.7 % — HIGH (ref 43–77)
NRBC # BLD: 0 /100 WBCS — SIGNIFICANT CHANGE UP (ref 0–0)
PLATELET # BLD AUTO: 340 K/UL — SIGNIFICANT CHANGE UP (ref 150–400)
POTASSIUM SERPL-MCNC: 4.6 MMOL/L — SIGNIFICANT CHANGE UP (ref 3.5–5.3)
POTASSIUM SERPL-SCNC: 4.6 MMOL/L — SIGNIFICANT CHANGE UP (ref 3.5–5.3)
PROCALCITONIN SERPL-MCNC: 0.98 NG/ML — HIGH (ref 0–0.04)
PROT SERPL-MCNC: 6.9 G/DL — SIGNIFICANT CHANGE UP (ref 6–8.3)
PROTHROM AB SERPL-ACNC: 14.8 SEC — HIGH (ref 10.6–13.6)
RBC # BLD: 4.6 M/UL — SIGNIFICANT CHANGE UP (ref 3.8–5.2)
RBC # FLD: 12.9 % — SIGNIFICANT CHANGE UP (ref 10.3–14.5)
SODIUM SERPL-SCNC: 133 MMOL/L — LOW (ref 135–145)
WBC # BLD: 11.53 K/UL — HIGH (ref 3.8–10.5)
WBC # FLD AUTO: 11.53 K/UL — HIGH (ref 3.8–10.5)

## 2021-09-09 PROCEDURE — 99233 SBSQ HOSP IP/OBS HIGH 50: CPT

## 2021-09-09 RX ORDER — DEXAMETHASONE 0.5 MG/5ML
10 ELIXIR ORAL DAILY
Refills: 0 | Status: DISCONTINUED | OUTPATIENT
Start: 2021-09-09 | End: 2021-09-14

## 2021-09-09 RX ORDER — MEROPENEM 1 G/30ML
1000 INJECTION INTRAVENOUS EVERY 8 HOURS
Refills: 0 | Status: COMPLETED | OUTPATIENT
Start: 2021-09-09 | End: 2021-09-14

## 2021-09-09 RX ADMIN — Medication 200 MILLIGRAM(S): at 03:07

## 2021-09-09 RX ADMIN — LINACLOTIDE 290 MICROGRAM(S): 145 CAPSULE, GELATIN COATED ORAL at 05:27

## 2021-09-09 RX ADMIN — Medication 10 MILLIGRAM(S): at 14:43

## 2021-09-09 RX ADMIN — MEROPENEM 100 MILLIGRAM(S): 1 INJECTION INTRAVENOUS at 14:43

## 2021-09-09 RX ADMIN — ENOXAPARIN SODIUM 40 MILLIGRAM(S): 100 INJECTION SUBCUTANEOUS at 11:17

## 2021-09-09 RX ADMIN — Medication 100 MICROGRAM(S): at 05:27

## 2021-09-09 RX ADMIN — REMDESIVIR 500 MILLIGRAM(S): 5 INJECTION INTRAVENOUS at 11:17

## 2021-09-09 RX ADMIN — Medication 200 MILLIGRAM(S): at 22:03

## 2021-09-09 RX ADMIN — MEROPENEM 100 MILLIGRAM(S): 1 INJECTION INTRAVENOUS at 22:03

## 2021-09-09 RX ADMIN — PANTOPRAZOLE SODIUM 40 MILLIGRAM(S): 20 TABLET, DELAYED RELEASE ORAL at 05:27

## 2021-09-09 RX ADMIN — Medication 200 MILLIGRAM(S): at 11:17

## 2021-09-09 RX ADMIN — Medication 6 MILLIGRAM(S): at 05:27

## 2021-09-09 NOTE — PROGRESS NOTE ADULT - ASSESSMENT
80yo female who presents with cough, fever for the last 4-5 days. pt  states her son had covid several weeks ago and was cleared on aug 29 and pt has been sick for the last 4-5 days, dry cough, fever tonight of 101 and vomiting no sob, pt denies any other complaints, pt is not vaccinated due to her multiple allergies    d dimer pending this am   pt requiring higher fio2 support   VS noted      monitor LFTs  DVT p  Remdesivir and Decadron for covid viral pna  CXR noted - LABS reviewed  monitor VS and HD  titrate fio2 support - keep sat > 88 pct  pronate as tolerated  tylenol - robitussin PRN for sx management  isolation precs  supportive medical regimen - nutrition - assist with needs

## 2021-09-09 NOTE — PROGRESS NOTE ADULT - SUBJECTIVE AND OBJECTIVE BOX
Date/Time Patient Seen:  		  Referring MD:   Data Reviewed	       Patient is a 81y old  Female who presents with a chief complaint of SOB, Hypoxia, suspected COVID-19 infection (08 Sep 2021 14:17)      Subjective/HPI     PAST MEDICAL & SURGICAL HISTORY:  Chronic Back Pain    Hypothyroidism    Edema    Cataract    Spastic colon    Lumbar back pain    Constipation    Osteoarthritis    MRSA cellulitis    MRSA colonization    Diabetes    S/P Hysterectomy  1987    S/P Cholecystectomy  1968    S/P Appendectomy  1968    S/P TKR (Total Knee Replacement)  Right 2015    S/P TKR (total knee replacement)  Left 2009    H/O Spinal surgery  2009    H/O sinus surgery  2/2017    [place] filter in place  2009          Medication list         MEDICATIONS  (STANDING):  dexAMETHasone     Tablet 6 milliGRAM(s) Oral daily  enoxaparin Injectable 40 milliGRAM(s) SubCutaneous daily  levothyroxine 100 MICROGram(s) Oral daily  linaclotide 290 MICROGram(s) Oral before breakfast  pantoprazole    Tablet 40 milliGRAM(s) Oral before breakfast  remdesivir  IVPB 100 milliGRAM(s) IV Intermittent every 24 hours  remdesivir  IVPB   IV Intermittent     MEDICATIONS  (PRN):  acetaminophen   Tablet .. 650 milliGRAM(s) Oral every 6 hours PRN Temp greater or equal to 38.5C (101.3F), Mild Pain (1 - 3)  guaiFENesin Oral Liquid (Sugar-Free) 200 milliGRAM(s) Oral every 6 hours PRN Cough  ondansetron Injectable 4 milliGRAM(s) IV Push every 8 hours PRN Nausea and/or Vomiting         Vitals log        ICU Vital Signs Last 24 Hrs  T(C): 36.9 (09 Sep 2021 04:44), Max: 37.3 (08 Sep 2021 13:47)  T(F): 98.5 (09 Sep 2021 04:44), Max: 99.1 (08 Sep 2021 13:47)  HR: 68 (09 Sep 2021 04:44) (68 - 100)  BP: 111/68 (09 Sep 2021 04:44) (111/68 - 156/74)  BP(mean): --  ABP: --  ABP(mean): --  RR: 18 (09 Sep 2021 04:44) (17 - 18)  SpO2: 95% (09 Sep 2021 04:44) (85% - 95%)           Input and Output:  I&O's Detail    08 Sep 2021 07:01  -  09 Sep 2021 05:51  --------------------------------------------------------  IN:    Oral Fluid: 300 mL  Total IN: 300 mL    OUT:  Total OUT: 0 mL    Total NET: 300 mL          Lab Data                        13.7   7.45  )-----------( 297      ( 08 Sep 2021 11:20 )             41.8     09-08    132<L>  |  100  |  18  ----------------------------<  72  4.3   |  21<L>  |  0.93    Ca    8.4<L>      08 Sep 2021 13:08    TPro  7.1  /  Alb  2.3<L>  /  TBili  0.3  /  DBili  x   /  AST  83<H>  /  ALT  100<H>  /  AlkPhos  107  09-08            Review of Systems	      Objective     Physical Examination    heart s1s2  lung dec BS  abd soft      Pertinent Lab findings & Imaging      Dorothy:  NO   Adequate UO     I&O's Detail    08 Sep 2021 07:01  -  09 Sep 2021 05:51  --------------------------------------------------------  IN:    Oral Fluid: 300 mL  Total IN: 300 mL    OUT:  Total OUT: 0 mL    Total NET: 300 mL               Discussed with:     Cultures:	        Radiology

## 2021-09-09 NOTE — PROGRESS NOTE ADULT - SUBJECTIVE AND OBJECTIVE BOX
Patient is a 81y old  Female who presents with a chief complaint of SOB, Hypoxia, suspected COVID-19 infection (09 Sep 2021 11:19)      INTERVAL HPI: Pt seen and examined. States she is still sob but states she is not going to stay beyond a sunday admission and is she worsens she will go to a  before getting intubated, she understands and verbalizes risks of signing out against medical advice including permanent disability and/or death.     OVERNIGHT EVENTS: pt required venti mask for desaturation overnight on nasal cannula  T(F): 98.1 (09-09-21 @ 12:51), Max: 98.5 (09-09-21 @ 04:44)  HR: 69 (09-09-21 @ 12:51) (68 - 74)  BP: 119/68 (09-09-21 @ 12:51) (111/68 - 124/61)  RR: 18 (09-09-21 @ 12:51) (17 - 18)  SpO2: 92% (09-09-21 @ 12:51) (90% - 95%)  I&O's Summary    08 Sep 2021 07:01  -  09 Sep 2021 07:00  --------------------------------------------------------  IN: 300 mL / OUT: 0 mL / NET: 300 mL        REVIEW OF SYSTEMS:  comprehensive ros negative except as above in hpi     PHYSICAL EXAM:  GENERAL: mild resp distress, thin, elder  HEAD:  Atraumatic, Normocephalic  EYES: EOMI, PERRLA, conjunctiva and sclera clear  ENMT: No tonsillar erythema, exudates, or enlargement; Moist mucous membranes, Good dentition, No lesions  NERVOUS SYSTEM:  Alert & Oriented X3, Good concentration; Motor Strength 4/5 B/L upper and lower extremities  CHEST/LUNG: diminished b/l bases on auscultation,  notable sob with exertion  HEART: Regular rate and rhythm; No murmurs, rubs, or gallops  ABDOMEN: Soft, Nontender, Nondistended; Bowel sounds present  EXTREMITIES:  2+ Peripheral Pulses, No clubbing, cyanosis, or edema  SKIN: No rashes or lesions    LABS:                        14.1   11.53 )-----------( 340      ( 09 Sep 2021 08:22 )             42.5     09-08    132<L>  |  100  |  18  ----------------------------<  72  4.3   |  21<L>  |  0.93    Ca    8.4<L>      08 Sep 2021 13:08    TPro  7.1  /  Alb  2.3<L>  /  TBili  0.3  /  DBili  x   /  AST  83<H>  /  ALT  100<H>  /  AlkPhos  107  09-08    PT/INR - ( 09 Sep 2021 08:22 )   PT: 14.8 sec;   INR: 1.28 ratio         PTT - ( 08 Sep 2021 17:29 )  PTT:40.6 sec    CAPILLARY BLOOD GLUCOSE          09-07 @ 08:53   No growth to date.  --  --          MEDICATIONS  (STANDING):  dexAMETHasone     Tablet 10 milliGRAM(s) Oral daily  enoxaparin Injectable 40 milliGRAM(s) SubCutaneous daily  levothyroxine 100 MICROGram(s) Oral daily  linaclotide 290 MICROGram(s) Oral before breakfast  meropenem  IVPB 1000 milliGRAM(s) IV Intermittent every 8 hours  pantoprazole    Tablet 40 milliGRAM(s) Oral before breakfast  remdesivir  IVPB 100 milliGRAM(s) IV Intermittent every 24 hours  remdesivir  IVPB   IV Intermittent     MEDICATIONS  (PRN):  acetaminophen   Tablet .. 650 milliGRAM(s) Oral every 6 hours PRN Temp greater or equal to 38.5C (101.3F), Mild Pain (1 - 3)  guaiFENesin Oral Liquid (Sugar-Free) 200 milliGRAM(s) Oral every 6 hours PRN Cough  ondansetron Injectable 4 milliGRAM(s) IV Push every 8 hours PRN Nausea and/or Vomiting

## 2021-09-09 NOTE — PROGRESS NOTE ADULT - ASSESSMENT
Ms Ferrer is a 82 yo F PMH Hypothyroidism, Irritable Bowel Syndrome presenting from home with SOB with a recent COVID-19 exposure [her son] who was diagnosed with COVID here at  but then sent home and his unvaccinated mother cared for him.  She reports   symptom onset 8/30.  She is not vaccinated against COVID-19   COVID-19 PCR: Detected (07 Sep 2021 04:49)    RECOMMENDATIONS  9/7- on NC-STEROIDs started, LMWH started  9/8 NC-3L, NLR 6.11/0.87=7, REMDESIVIR started, rec continued steroids and LMWH-continue supportive care, noted asymmetry on CXR so will follow for any indication that abx would be of benefit  9/9 CHLOEURI, NLR10.23/0.79=13, will increase steroids to 10mg/day, follow for any indication that TOCI indicated, noted CXR, will add abx to cover for potential secondary bacterial process (complicated list of allergies so started meropenem) ordered additional testing      TOCILIZUMAB may be considered for patients during the early inflammatory phase (day7-14-and less than 48hrs at ICU level care) progressing due to COVID w increasing oxygen support after start of steroids, would not use without steroids or past the early inflammatory period (use less than 14 days since symptom onset or initial +PCR), caution regarding use with active infection, other immunosuppressants, GI perforation, ANC<1000, Plts<50k, AST/ALT>5xULN, dosing <40kg-8mg/kg, 20-93iw-033sq, >56-34xt-280og, >90kg-800mg, in studies if fails to respond can give second dose within next 12-24 hours    We will follow along in the care of this patient. Please call us at [phone] or text me directly on my cell# at [phone] with any concerns.

## 2021-09-09 NOTE — PROGRESS NOTE ADULT - ASSESSMENT
Ms Ferrer is a 82 yo F presenting from home with SOB found to have COVID-19 infection. PMH Hypothyroidism, Irritable Bowel Syndrome    COVID-19 Infection: Sepsis criteria met evolving from admission, with acute respiratory failure with hypoxia  -pt is day 7 from onset of symptoms  -NLR: 13  -cont  remdesmivir and decadron to 10mg daily, if worsens consider toci  -empiric antibx coverage of meropenem as per ID  -apprec ID recs Dr Mcmullen  -Dr Cedeno pulm  -CXR shows Right upper lobe infiltrate. Thoracolumbar hardware. No indication for abx as this is a viral process. Do not suspect bacterial superinfection at this time.   -remainder as above.      Hypothyroidism:   chronic stable  Continue Levothyroxine.     Irritable Bowel Syndrome: no active symptoms or flare.   -restart linzess, home dose, pt to bring in her own  -continue to monitor for bms    DVT ppx: Lovenox for dvt ppx

## 2021-09-09 NOTE — PROGRESS NOTE ADULT - SUBJECTIVE AND OBJECTIVE BOX
[facility] DIVISION of INFECTIOUS DISEASE  Pradeep Mcmullen MD PhD, Hortensia Grewal MD, Sasha Ness MD, Meagan Patrick MD  and providing coverage with Lilly Galindo MD and Hailey Geller MD  Providing Infectious Disease Consultations at [facility]      Office# [phone] to schedule follow up appointments  Answering Service for urgent calls or New Consults [phone]  Cell# to text for urgent issues Pradeep Mcmullen [phone]     Infectious diseases progress note:    MICHEL TRENT is a 81y y. o. Female patient    COVID Patient    Allergies    Advil (Unknown)  allergic to food coloring (Unknown)  Ceclor (Other)  cephalosporins (Other)  ciprofloxacin (Other)  Compazine (Other)  food coloring red/blue, wheat, tide, morphine, ceclor, compazine, advil, mortim, iv contrast, shellfish, oregano, tomoatoes, pork, peas, mold/spores (Unknown)  morphine (Unknown)  Motrin (Unknown)  Originally Entered as [Rash] reaction to [oregano spice] (Unknown)  peanuts (Other)  pork (Unknown)  shellfish (Unknown)    Intolerances        ANTIBIOTICS/RELEVANT:  antimicrobials  remdesivir  IVPB 100 milliGRAM(s) IV Intermittent every 24 hours  remdesivir  IVPB   IV Intermittent     immunologic:    OTHER:  acetaminophen   Tablet .. 650 milliGRAM(s) Oral every 6 hours PRN  dexAMETHasone     Tablet 6 milliGRAM(s) Oral daily  enoxaparin Injectable 40 milliGRAM(s) SubCutaneous daily  guaiFENesin Oral Liquid (Sugar-Free) 200 milliGRAM(s) Oral every 6 hours PRN  levothyroxine 100 MICROGram(s) Oral daily  linaclotide 290 MICROGram(s) Oral before breakfast  ondansetron Injectable 4 milliGRAM(s) IV Push every 8 hours PRN  pantoprazole    Tablet 40 milliGRAM(s) Oral before breakfast      Objective:  Vital Signs Last 24 Hrs  T(C): 36.9 (09 Sep 2021 04:44), Max: 37.3 (08 Sep 2021 13:47)  T(F): 98.5 (09 Sep 2021 04:44), Max: 99.1 (08 Sep 2021 13:47)  HR: 68 (09 Sep 2021 04:44) (68 - 100)  BP: 111/68 (09 Sep 2021 04:44) (111/68 - 156/74)  BP(mean): --  RR: 18 (09 Sep 2021 04:44) (17 - 18)  SpO2: 95% (09 Sep 2021 04:44) (90% - 95%)    T(C): 36.9 (09-09-21 @ 04:44), Max: 39.3 (09-07-21 @ 21:17)  T(C): 36.9 (09-09-21 @ 04:44), Max: 39.3 (09-07-21 @ 21:17)  T(C): 36.9 (09-09-21 @ 04:44), Max: 39.3 (09-07-21 @ 21:17)    PHYSICAL EXAM:  HEENT: NC atraumatic  Neck: supple  Respiratory: no accessory muscle use, breathing comfortably  Cardiovascular: distant  Gastrointestinal: normal appearing, nondistended  Extremities: no clubbing, no cyanosis,        LABS:                          14.1   11.53 )-----------( 340      ( 09 Sep 2021 08:22 )             42.5       11.53 09-09 @ 08:22  7.45 09-08 @ 11:20  10.76 09-07 @ 05:19      09-08    132<L>  |  100  |  18  ----------------------------<  72  4.3   |  21<L>  |  0.93    Ca    8.4<L>      08 Sep 2021 13:08    TPro  7.1  /  Alb  2.3<L>  /  TBili  0.3  /  DBili  x   /  AST  83<H>  /  ALT  100<H>  /  AlkPhos  107  09-08      Creatinine, Serum: 0.93 mg/dL (09-08-21 @ 13:08)  Creatinine, Serum: 0.82 mg/dL (09-07-21 @ 05:19)      PT/INR - ( 09 Sep 2021 08:22 )   PT: 14.8 sec;   INR: 1.28 ratio         PTT - ( 08 Sep 2021 17:29 )  PTT:40.6 sec          COVID RISK SCORE  Auto Neutrophil #: 10.23 K/uL (09-09-21 @ 08:22)  Auto Lymphocyte #: 0.79 K/uL (09-09-21 @ 08:22)  Auto Neutrophil #: 6.11 K/uL (09-08-21 @ 11:20)  Auto Lymphocyte #: 0.87 K/uL (09-08-21 @ 11:20)  Auto Neutrophil #: 9.38 K/uL (09-07-21 @ 05:19)  Auto Lymphocyte #: 0.93 K/uL (09-07-21 @ 05:19)    Lactate, Blood: 1.1 mmol/L (09-07-21 @ 05:19)    Auto Eosinophil #: 0.00 K/uL (09-09-21 @ 08:22)  Auto Eosinophil #: 0.01 K/uL (09-08-21 @ 11:20)  Auto Eosinophil #: 0.01 K/uL (09-07-21 @ 05:19)        Procalcitonin, Serum: 0.98 ng/mL (09-09-21 @ 08:22)                  INR: 1.28 ratio (09-09-21 @ 08:22)  INR: 1.40 ratio (09-08-21 @ 17:29)  Activated Partial Thromboplastin Time: 40.6 sec (09-08-21 @ 17:29)  Activated Partial Thromboplastin Time: 34.1 sec (09-07-21 @ 05:19)  INR: 1.13 ratio (09-07-21 @ 05:19)    D-Dimer Assay, Quantitative: 231 ng/mL DDU (09-09-21 @ 08:22)        MICROBIOLOGY:      RADIOLOGY & ADDITIONAL STUDIES:    < from: Xray Chest 1 View-PORTABLE IMMEDIATE (09.07.21 @ 05:20) >    EXAM:  XR CHEST PORTABLE IMMED 1V                            PROCEDURE DATE:  09/07/2021          INTERPRETATION:  AP chest on September 7, 2021 at 5:05 AM. Patient is short of breath with cough and fever.    Heart size is within normal limits. Elevated left hemidiaphragm again noted.    On October 27, 2009 there was some linear atelectasis at the left base.    Present film shows a right upper lobe infiltrate and extensive thoracolumbar hardware.    IMPRESSION: Right upper lobe infiltrate. Thoracolumbar hardware.

## 2021-09-10 LAB
ALBUMIN SERPL ELPH-MCNC: 2.8 G/DL — LOW (ref 3.3–5)
ALP SERPL-CCNC: 127 U/L — HIGH (ref 40–120)
ALT FLD-CCNC: 251 U/L — HIGH (ref 12–78)
ANION GAP SERPL CALC-SCNC: 6 MMOL/L — SIGNIFICANT CHANGE UP (ref 5–17)
ANION GAP SERPL CALC-SCNC: 7 MMOL/L — SIGNIFICANT CHANGE UP (ref 5–17)
AST SERPL-CCNC: 141 U/L — HIGH (ref 15–37)
BASOPHILS # BLD AUTO: 0.01 K/UL — SIGNIFICANT CHANGE UP (ref 0–0.2)
BASOPHILS # BLD AUTO: 0.01 K/UL — SIGNIFICANT CHANGE UP (ref 0–0.2)
BASOPHILS NFR BLD AUTO: 0.1 % — SIGNIFICANT CHANGE UP (ref 0–2)
BASOPHILS NFR BLD AUTO: 0.1 % — SIGNIFICANT CHANGE UP (ref 0–2)
BILIRUB SERPL-MCNC: 0.4 MG/DL — SIGNIFICANT CHANGE UP (ref 0.2–1.2)
BUN SERPL-MCNC: 28 MG/DL — HIGH (ref 7–23)
BUN SERPL-MCNC: 30 MG/DL — HIGH (ref 7–23)
CALCIUM SERPL-MCNC: 9.1 MG/DL — SIGNIFICANT CHANGE UP (ref 8.5–10.1)
CALCIUM SERPL-MCNC: 9.4 MG/DL — SIGNIFICANT CHANGE UP (ref 8.5–10.1)
CHLORIDE SERPL-SCNC: 101 MMOL/L — SIGNIFICANT CHANGE UP (ref 96–108)
CHLORIDE SERPL-SCNC: 104 MMOL/L — SIGNIFICANT CHANGE UP (ref 96–108)
CO2 SERPL-SCNC: 29 MMOL/L — SIGNIFICANT CHANGE UP (ref 22–31)
CO2 SERPL-SCNC: 30 MMOL/L — SIGNIFICANT CHANGE UP (ref 22–31)
CREAT SERPL-MCNC: 0.7 MG/DL — SIGNIFICANT CHANGE UP (ref 0.5–1.3)
CREAT SERPL-MCNC: 0.78 MG/DL — SIGNIFICANT CHANGE UP (ref 0.5–1.3)
D DIMER BLD IA.RAPID-MCNC: 246 NG/ML DDU — HIGH
EOSINOPHIL # BLD AUTO: 0 K/UL — SIGNIFICANT CHANGE UP (ref 0–0.5)
EOSINOPHIL # BLD AUTO: 0 K/UL — SIGNIFICANT CHANGE UP (ref 0–0.5)
EOSINOPHIL NFR BLD AUTO: 0 % — SIGNIFICANT CHANGE UP (ref 0–6)
EOSINOPHIL NFR BLD AUTO: 0 % — SIGNIFICANT CHANGE UP (ref 0–6)
GLUCOSE SERPL-MCNC: 149 MG/DL — HIGH (ref 70–99)
GLUCOSE SERPL-MCNC: 196 MG/DL — HIGH (ref 70–99)
HCT VFR BLD CALC: 44 % — SIGNIFICANT CHANGE UP (ref 34.5–45)
HCT VFR BLD CALC: 46.3 % — HIGH (ref 34.5–45)
HGB BLD-MCNC: 14.3 G/DL — SIGNIFICANT CHANGE UP (ref 11.5–15.5)
HGB BLD-MCNC: 14.9 G/DL — SIGNIFICANT CHANGE UP (ref 11.5–15.5)
IMM GRANULOCYTES NFR BLD AUTO: 0.4 % — SIGNIFICANT CHANGE UP (ref 0–1.5)
IMM GRANULOCYTES NFR BLD AUTO: 0.8 % — SIGNIFICANT CHANGE UP (ref 0–1.5)
INR BLD: 1.27 RATIO — HIGH (ref 0.88–1.16)
LYMPHOCYTES # BLD AUTO: 0.7 K/UL — LOW (ref 1–3.3)
LYMPHOCYTES # BLD AUTO: 0.73 K/UL — LOW (ref 1–3.3)
LYMPHOCYTES # BLD AUTO: 7.1 % — LOW (ref 13–44)
LYMPHOCYTES # BLD AUTO: 7.7 % — LOW (ref 13–44)
MCHC RBC-ENTMCNC: 30.2 PG — SIGNIFICANT CHANGE UP (ref 27–34)
MCHC RBC-ENTMCNC: 30.2 PG — SIGNIFICANT CHANGE UP (ref 27–34)
MCHC RBC-ENTMCNC: 32.2 GM/DL — SIGNIFICANT CHANGE UP (ref 32–36)
MCHC RBC-ENTMCNC: 32.5 GM/DL — SIGNIFICANT CHANGE UP (ref 32–36)
MCV RBC AUTO: 93 FL — SIGNIFICANT CHANGE UP (ref 80–100)
MCV RBC AUTO: 93.9 FL — SIGNIFICANT CHANGE UP (ref 80–100)
MONOCYTES # BLD AUTO: 0.24 K/UL — SIGNIFICANT CHANGE UP (ref 0–0.9)
MONOCYTES # BLD AUTO: 0.4 K/UL — SIGNIFICANT CHANGE UP (ref 0–0.9)
MONOCYTES NFR BLD AUTO: 2.5 % — SIGNIFICANT CHANGE UP (ref 2–14)
MONOCYTES NFR BLD AUTO: 4.1 % — SIGNIFICANT CHANGE UP (ref 2–14)
MRSA PCR RESULT.: SIGNIFICANT CHANGE UP
NEUTROPHILS # BLD AUTO: 8.44 K/UL — HIGH (ref 1.8–7.4)
NEUTROPHILS # BLD AUTO: 8.67 K/UL — HIGH (ref 1.8–7.4)
NEUTROPHILS NFR BLD AUTO: 88.3 % — HIGH (ref 43–77)
NEUTROPHILS NFR BLD AUTO: 88.9 % — HIGH (ref 43–77)
NRBC # BLD: 0 /100 WBCS — SIGNIFICANT CHANGE UP (ref 0–0)
NRBC # BLD: 0 /100 WBCS — SIGNIFICANT CHANGE UP (ref 0–0)
PLATELET # BLD AUTO: 487 K/UL — HIGH (ref 150–400)
PLATELET # BLD AUTO: 529 K/UL — HIGH (ref 150–400)
POTASSIUM SERPL-MCNC: 3.6 MMOL/L — SIGNIFICANT CHANGE UP (ref 3.5–5.3)
POTASSIUM SERPL-MCNC: 4.1 MMOL/L — SIGNIFICANT CHANGE UP (ref 3.5–5.3)
POTASSIUM SERPL-SCNC: 3.6 MMOL/L — SIGNIFICANT CHANGE UP (ref 3.5–5.3)
POTASSIUM SERPL-SCNC: 4.1 MMOL/L — SIGNIFICANT CHANGE UP (ref 3.5–5.3)
PROT SERPL-MCNC: 7.9 G/DL — SIGNIFICANT CHANGE UP (ref 6–8.3)
PROTHROM AB SERPL-ACNC: 14.7 SEC — HIGH (ref 10.6–13.6)
RBC # BLD: 4.73 M/UL — SIGNIFICANT CHANGE UP (ref 3.8–5.2)
RBC # BLD: 4.93 M/UL — SIGNIFICANT CHANGE UP (ref 3.8–5.2)
RBC # FLD: 12.8 % — SIGNIFICANT CHANGE UP (ref 10.3–14.5)
RBC # FLD: 13 % — SIGNIFICANT CHANGE UP (ref 10.3–14.5)
S AUREUS DNA NOSE QL NAA+PROBE: SIGNIFICANT CHANGE UP
SODIUM SERPL-SCNC: 138 MMOL/L — SIGNIFICANT CHANGE UP (ref 135–145)
SODIUM SERPL-SCNC: 139 MMOL/L — SIGNIFICANT CHANGE UP (ref 135–145)
WBC # BLD: 9.5 K/UL — SIGNIFICANT CHANGE UP (ref 3.8–10.5)
WBC # BLD: 9.82 K/UL — SIGNIFICANT CHANGE UP (ref 3.8–10.5)
WBC # FLD AUTO: 9.5 K/UL — SIGNIFICANT CHANGE UP (ref 3.8–10.5)
WBC # FLD AUTO: 9.82 K/UL — SIGNIFICANT CHANGE UP (ref 3.8–10.5)

## 2021-09-10 PROCEDURE — 99232 SBSQ HOSP IP/OBS MODERATE 35: CPT

## 2021-09-10 PROCEDURE — 99233 SBSQ HOSP IP/OBS HIGH 50: CPT

## 2021-09-10 RX ORDER — TOCILIZUMAB 20 MG/ML
500 INJECTION, SOLUTION, CONCENTRATE INTRAVENOUS ONCE
Refills: 0 | Status: DISCONTINUED | OUTPATIENT
Start: 2021-09-10 | End: 2021-09-10

## 2021-09-10 RX ORDER — MULTIVIT WITH MIN/MFOLATE/K2 340-15/3 G
1 POWDER (GRAM) ORAL ONCE
Refills: 0 | Status: COMPLETED | OUTPATIENT
Start: 2021-09-10 | End: 2021-09-10

## 2021-09-10 RX ORDER — LACTULOSE 10 G/15ML
20 SOLUTION ORAL THREE TIMES A DAY
Refills: 0 | Status: DISCONTINUED | OUTPATIENT
Start: 2021-09-10 | End: 2021-09-14

## 2021-09-10 RX ADMIN — MEROPENEM 100 MILLIGRAM(S): 1 INJECTION INTRAVENOUS at 17:01

## 2021-09-10 RX ADMIN — MEROPENEM 100 MILLIGRAM(S): 1 INJECTION INTRAVENOUS at 05:30

## 2021-09-10 RX ADMIN — PANTOPRAZOLE SODIUM 40 MILLIGRAM(S): 20 TABLET, DELAYED RELEASE ORAL at 05:27

## 2021-09-10 RX ADMIN — REMDESIVIR 500 MILLIGRAM(S): 5 INJECTION INTRAVENOUS at 11:09

## 2021-09-10 RX ADMIN — ENOXAPARIN SODIUM 40 MILLIGRAM(S): 100 INJECTION SUBCUTANEOUS at 11:09

## 2021-09-10 RX ADMIN — LACTULOSE 20 GRAM(S): 10 SOLUTION ORAL at 21:25

## 2021-09-10 RX ADMIN — Medication 10 MILLIGRAM(S): at 05:25

## 2021-09-10 RX ADMIN — Medication 1 BOTTLE: at 17:01

## 2021-09-10 RX ADMIN — LACTULOSE 20 GRAM(S): 10 SOLUTION ORAL at 17:01

## 2021-09-10 RX ADMIN — Medication 100 MICROGRAM(S): at 05:26

## 2021-09-10 RX ADMIN — LINACLOTIDE 290 MICROGRAM(S): 145 CAPSULE, GELATIN COATED ORAL at 05:27

## 2021-09-10 RX ADMIN — Medication 200 MILLIGRAM(S): at 05:25

## 2021-09-10 NOTE — PROGRESS NOTE ADULT - TIME BILLING
care coordination, plan of care discussed face to face with patient, KENIA Reeves
discussed with patient face to face, KENIA Reeves
care coordination, plan of care, discussed with Dr Benedict FRAIRE, RN Mirna

## 2021-09-10 NOTE — CONSULT NOTE ADULT - ASSESSMENT
covid 19  IBS  constipation    suspect covid infection worsening her IBS  cont lactulose  agree with mg citrate  no objection to ex lax  cont linzess  if no bm then can repeat above on saturday  she is vegetarian but has been eating meat during this hospitalization, that may be exacerbating her constipation as well  d/w patient  outpatient follow up with her primary GI
Ms Ferrer is a 80 yo F PMH Hypothyroidism, Irritable Bowel Syndrome presenting from home with SOB with a recent COVID-19 exposure [her son] who was diagnosed with COVID here at  but then sent home and his unvaccinated mother cared for him.  She reports   symptom onset 8/30.  She is not vaccinated against COVID-19   COVID-19 PCR: Detected (07 Sep 2021 04:49)    RECOMMENDATIONS  9/7- on NC-STEROIDs started, LMWH started  9/8 NC-3L, NLR 6.11/0.87=7, REMDESIVIR started, rec continued steroids and LMWH-continue supportive care, noted asymmetry on CXR so will follow for any indication that abx would be of benefit    COVID-19-high risk for decompensation EARLY INFLAMMATORY PHASE (7-14 days post symptom onset),    REMDESIVIR-5 day course consider within 10 days of symptom onset, sats<94% on RA and prior to need for high flow oxygen or mech ventilation, Criteria for use include:  • ALT and AST < 10X ULN   STEROIDs recommended AFTER 1st week of symptom onset for any patients that are hypoxic  and  with dexamethasone 6mg  Q-day x 10 days (equivalent to solumedrol 32mg IV or Prednisone 40mg)-higher doses to be considered in more severe disease,   ANTICOAGULATION- most current data suggesting full dose anticoagulation in moderately ill patients with 1mg/kg BID LMWH (crCl >30) but in ICU patients prophylactic dosing w LMWH 40mg SQ Qday or higher if adjusted based on BMI but full dose to be considered in patients with increased risk for thromboembolic complications if bleeding risks are considered acceptable or decrease to prophylactic if elevated bleeding risks -heparin for hemodialysis patients, current HAMLET guidelines suggest to only consider discharge on oral anticoagulation with rivaroxaban (Xarelto) 10mg PO QD or Eliquis (apixaban) 2.5mg PO BID if meeting criteria for use based on non-COVID issues   TOCILIZUMAB may be considered for patients during the early inflammatory phase (day7-14-and less than 48hrs at ICU level care) progressing due to COVID w increasing oxygen support after start of steroids, would not use without steroids or past the early inflammatory period (use less than 14 days since symptom onset or initial +PCR), caution regarding use with active infection, other immunosuppressants, GI perforation, ANC<1000, Plts<50k, AST/ALT>5xULN, dosing <40kg-8mg/kg, 50-92kj-847ib, >43-37nw-376xk, >90kg-800mg, in studies if fails to respond can give second dose within next 12-24 hours  CONVALESCENT PLASMA-no clear benefit in COVID-19 despite extensive investigations  MONOCLONAL ANTIBODY THERAPY- critical role if give within first 7-10 days post symptom onset prior to onset of early inflammatory phase, mortality benefit in RECOVERY Trial only in hospitalized patients still serology negative but may be harmful if given late in hospitalized patients-EUA limits in hospital use  -daily, BMP, CBC w diff to follow NLR and in some contexts daily or periodic D-dimer levels, -other labs to risk stratify or with any decompensation  Procalcitonin,  Ferritin,  CRP, ESR, PT/PTT but limit excessive testing -antibiotics only if there is concern for a bacterial process (noted to be an issue in only a minority on presentation, rec full eval with ferritin procalcitonin ratio (FPR) <1000 suggesting bacterial process  (consider proning and tolerating lower oxygen saturation to avoid intubation).  All guidelines qualified with need to treat each patient based on their individual profile, phase of disease and most current data available    Thank you for consulting us and involving us in the management of this most interesting and challenging case. I certainly appreciate the challenges, stress and sacrifice during these difficult and challenging times.  Please call us at [phone] or text me directly on my cell# [phone] with any further questions or changes in clinical status for which ID input would be helpful  
80yo female who presents with cough, fever for the last 4-5 days. pt  states her son had covid several weeks ago and was cleared on aug 29 and pt has been sick for the last 4-5 days, dry cough, fever tonight of 101 and vomiting no sob, pt denies any other complaints, pt is not vaccinated due to her multiple allergies    monitor LFTs  will check D dimer  DVT p  Remdesivir and Decadron for covid viral pna  CXR noted - LABS reviewed  monitor VS and HD  titrate fio2 support - keep sat > 88 pct  pronate as tolerated  tylenol - robitussin PRN for sx management  isolation precs  supportive medical regimen - nutrition - assist with needs

## 2021-09-10 NOTE — BH CONSULTATION LIAISON ASSESSMENT NOTE - HPI (INCLUDE ILLNESS QUALITY, SEVERITY, DURATION, TIMING, CONTEXT, MODIFYING FACTORS, ASSOCIATED SIGNS AND SYMPTOMS)
Patient seen, evaluated and chart reviewed. Patient is a 80 yo F presenting from home with SOB with a recent COVID-19 exposure [her son]. PMH Hypothyroidism, Irritable Bowel Syndrome. Patient seen and examined at bedside. She reports having SOB and cough for about 4 days now. Her son was COVID-19 positive recently and was deemed "not contagious" as of August 29. Patient developed SOB and non-productive cough about 5 days ago, she also had a fever of 101 F yesterday. She is not vaccinated against COVID-19 because she has many allergies and feels she will be allergic to vaccine components. Denies chills, light-headedness, dizziness, headaches, nausea, vomiting, chest pain, palpitations, abdominal pain, constipation, diarrhea, melena, hematochezia, dysuria. Patient was diagnosed and treated for Covid-19. The issue of decision making capacity was raised. Currently patient is calm and cooperative, denies symptoms of psychosis, april or depression.

## 2021-09-10 NOTE — BH CONSULTATION LIAISON ASSESSMENT NOTE - CURRENT MEDICATION
MEDICATIONS  (STANDING):  dexAMETHasone     Tablet 10 milliGRAM(s) Oral daily  enoxaparin Injectable 40 milliGRAM(s) SubCutaneous daily  lactulose Syrup 20 Gram(s) Oral three times a day  levothyroxine 100 MICROGram(s) Oral daily  linaclotide 290 MICROGram(s) Oral before breakfast  magnesium citrate Oral Solution 1 Bottle Oral once  meropenem  IVPB 1000 milliGRAM(s) IV Intermittent every 8 hours  pantoprazole    Tablet 40 milliGRAM(s) Oral before breakfast  remdesivir  IVPB 100 milliGRAM(s) IV Intermittent every 24 hours  remdesivir  IVPB   IV Intermittent     MEDICATIONS  (PRN):  acetaminophen   Tablet .. 650 milliGRAM(s) Oral every 6 hours PRN Temp greater or equal to 38.5C (101.3F), Mild Pain (1 - 3)  guaiFENesin Oral Liquid (Sugar-Free) 200 milliGRAM(s) Oral every 6 hours PRN Cough  ondansetron Injectable 4 milliGRAM(s) IV Push every 8 hours PRN Nausea and/or Vomiting

## 2021-09-10 NOTE — BH CONSULTATION LIAISON ASSESSMENT NOTE - NSICDXPASTSURGICALHX_GEN_ALL_CORE_FT
PAST SURGICAL HISTORY:  Alton filter in place 2009    H/O sinus surgery 2/2017    H/O Spinal surgery 2009    S/P Appendectomy 1968    S/P Cholecystectomy 1968    S/P Hysterectomy 1987    S/P TKR (Total Knee Replacement) Right 2015    S/P TKR (total knee replacement) Left 2009

## 2021-09-10 NOTE — PROGRESS NOTE ADULT - ASSESSMENT
Ms Ferrer is a 82 yo F PMH Hypothyroidism, Irritable Bowel Syndrome presenting from home with SOB with a recent COVID-19 exposure [her son] who was diagnosed with COVID here at  but then sent home and his unvaccinated mother cared for him.  She reports   symptom onset 8/30.  She is NOT vaccinated against COVID-19   COVID-19 PCR: Detected (07 Sep 2021 04:49)    RECOMMENDATIONS  9/7- on NC-STEROIDs started, LMWH started  9/8 NC-3L, NLR 6.11/0.87=7, REMDESIVIR started, rec continued steroids and LMWH-continue supportive care, noted asymmetry on CXR so will follow for any indication that abx would be of benefit  9/9 VENTURI, NLR10.23/0.79=13, will increase steroids to 10mg/day, follow for any indication that TOCI indicated, noted CXR, will add abx to cover for potential secondary bacterial process (complicated list of allergies so started meropenem) ordered additional testing  9/10 escalated to NRB, NLR 8.44/0.73=11.6, progressing w increasing oxygen support despite increased steroids, low suspicion for active bacterial infection and hihgest suspicion os that this is being driven by COVID in this unvaccinated woman, will order TOCI, concerns in this pt as she reports she is DNI so delay in IL6R blockade in this context high risk and pt reports she does not want to die      TOCILIZUMAB may be considered for patients during the early inflammatory phase (day7-14-and less than 48hrs at ICU level care) progressing due to COVID w increasing oxygen support after start of steroids, would not use without steroids or past the early inflammatory period (use less than 14 days since symptom onset or initial +PCR), caution regarding use with active infection, other immunosuppressants, GI perforation, ANC<1000, Plts<50k, AST/ALT>5xULN, dosing <40kg-8mg/kg, 19-75mq-548jn, >29-59bt-386tf, >90kg-800mg, in studies if fails to respond can give second dose within next 12-24 hours    We will follow along in the care of this patient. Please call us at [phone] or text me directly on my cell# at [phone] with any concerns.   Ms Ferrer is a 82 yo F PMH Hypothyroidism, Irritable Bowel Syndrome presenting from home with SOB with a recent COVID-19 exposure [her son] who was diagnosed with COVID here at  but then sent home and his unvaccinated mother cared for him.  She reports   symptom onset 8/30.  She is NOT vaccinated against COVID-19   COVID-19 PCR: Detected (07 Sep 2021 04:49)    RECOMMENDATIONS  9/7- on NC-STEROIDs started, LMWH started  9/8 NC-3L, NLR 6.11/0.87=7, REMDESIVIR started, rec continued steroids and LMWH-continue supportive care, noted asymmetry on CXR so will follow for any indication that abx would be of benefit  9/9 VENTURI, NLR10.23/0.79=13, will increase steroids to 10mg/day, follow for any indication that TOCI indicated, noted CXR, will add abx to cover for potential secondary bacterial process (complicated list of allergies so started meropenem) ordered additional testing  9/10 escalated to NRB, NLR 8.44/0.73=11.6, progressing w increasing oxygen support despite increased steroids, low suspicion for active bacterial infection and hihgest suspicion os that this is being driven by COVID in this unvaccinated woman, will order TOCI but as discussed w Dr Terence Bello stewardship will only allow if pt needs to be on HFNC, concerns in this pt as she reports she is DNI so delay in IL6R blockade in this context high risk and pt reports she does not want to die. Communicated w Dr Cedeno regarding oxygen deliver. Spoke w daughter 'Oxana Ferrer' same name [phone].    TOCILIZUMAB may be considered for patients during the early inflammatory phase (day7-14-and less than 48hrs at ICU level care) progressing due to COVID w increasing oxygen support after start of steroids, would not use without steroids or past the early inflammatory period (use less than 14 days since symptom onset or initial +PCR), caution regarding use with active infection, other immunosuppressants, GI perforation, ANC<1000, Plts<50k, AST/ALT>5xULN, dosing <40kg-8mg/kg, 95-11qp-640uj, >93-49mt-605gd, >90kg-800mg, in studies if fails to respond can give second dose within next 12-24 hours    We will follow along in the care of this patient. Please call us at [phone] or text me directly on my cell# at [phone] with any concerns.

## 2021-09-10 NOTE — PROGRESS NOTE ADULT - SUBJECTIVE AND OBJECTIVE BOX
Date/Time Patient Seen:  		  Referring MD:   Data Reviewed	       Patient is a 81y old  Female who presents with a chief complaint of SOB, Hypoxia, suspected COVID-19 infection (09 Sep 2021 11:19)      Subjective/HPI     PAST MEDICAL & SURGICAL HISTORY:  Chronic Back Pain    Hypothyroidism    Edema    Cataract    Spastic colon    Lumbar back pain    Constipation    Osteoarthritis    MRSA cellulitis    MRSA colonization    Diabetes    S/P Hysterectomy  1987    S/P Cholecystectomy  1968    S/P Appendectomy  1968    S/P TKR (Total Knee Replacement)  Right 2015    S/P TKR (total knee replacement)  Left 2009    H/O Spinal surgery  2009    H/O sinus surgery  2/2017    [place] filter in place  2009          Medication list         MEDICATIONS  (STANDING):  dexAMETHasone     Tablet 10 milliGRAM(s) Oral daily  enoxaparin Injectable 40 milliGRAM(s) SubCutaneous daily  levothyroxine 100 MICROGram(s) Oral daily  linaclotide 290 MICROGram(s) Oral before breakfast  meropenem  IVPB 1000 milliGRAM(s) IV Intermittent every 8 hours  pantoprazole    Tablet 40 milliGRAM(s) Oral before breakfast  remdesivir  IVPB 100 milliGRAM(s) IV Intermittent every 24 hours  remdesivir  IVPB   IV Intermittent     MEDICATIONS  (PRN):  acetaminophen   Tablet .. 650 milliGRAM(s) Oral every 6 hours PRN Temp greater or equal to 38.5C (101.3F), Mild Pain (1 - 3)  guaiFENesin Oral Liquid (Sugar-Free) 200 milliGRAM(s) Oral every 6 hours PRN Cough  ondansetron Injectable 4 milliGRAM(s) IV Push every 8 hours PRN Nausea and/or Vomiting         Vitals log        ICU Vital Signs Last 24 Hrs  T(C): 36.2 (10 Sep 2021 05:02), Max: 36.7 (09 Sep 2021 12:51)  T(F): 97.1 (10 Sep 2021 05:02), Max: 98.1 (09 Sep 2021 12:51)  HR: 64 (10 Sep 2021 05:02) (64 - 72)  BP: 119/62 (10 Sep 2021 05:02) (119/62 - 138/64)  BP(mean): --  ABP: --  ABP(mean): --  RR: 18 (10 Sep 2021 05:02) (18 - 18)  SpO2: 94% (10 Sep 2021 05:02) (90% - 94%)           Input and Output:  I&O's Detail    08 Sep 2021 07:01  -  09 Sep 2021 07:00  --------------------------------------------------------  IN:    Oral Fluid: 300 mL  Total IN: 300 mL    OUT:  Total OUT: 0 mL    Total NET: 300 mL          Lab Data                        14.1   11.53 )-----------( 340      ( 09 Sep 2021 08:22 )             42.5     09-09    133<L>  |  104  |  28<H>  ----------------------------<  208<H>  4.6   |  20<L>  |  0.81    Ca    8.5      09 Sep 2021 18:36    TPro  6.9  /  Alb  2.1<L>  /  TBili  0.4  /  DBili  x   /  AST  121<H>  /  ALT  172<H>  /  AlkPhos  110  09-09            Review of Systems	      Objective     Physical Examination    heart s1s2  lung dc BS  abd soft  head nc      Pertinent Lab findings & Imaging      Dorothy:  NO   Adequate UO     I&O's Detail    08 Sep 2021 07:01  -  09 Sep 2021 07:00  --------------------------------------------------------  IN:    Oral Fluid: 300 mL  Total IN: 300 mL    OUT:  Total OUT: 0 mL    Total NET: 300 mL               Discussed with:     Cultures:	        Radiology

## 2021-09-10 NOTE — PROGRESS NOTE ADULT - ASSESSMENT
Ms Ferrer is a 80 yo F presenting from home with SOB found to have COVID-19 infection. PMH Hypothyroidism, Irritable Bowel Syndrome    COVID-19 Infection: Sepsis criteria met evolving from admission, with acute respiratory failure with hypoxia  -pt is day 8 from onset of symptoms  -NLR: 12.38  -Pt is requiring O2 supplementation as she desaturred to 86% on room air, at 4L she is 90% and will likely require home oxygen for discharge even if AMA will establish home O2  -apprec psych recs dr segura pt has medical decision making capacity  -cont  remdesmivir and decadron to 10mg daily, if worsens consider toci  -empiric antibx coverage of meropenem as per ID  -apprec ID recs Dr Darnell smith  -Dr Cedeno pulm  -CXR shows Right upper lobe infiltrate. Thoracolumbar hardware. No indication for abx as this is a viral process. Do not suspect bacterial superinfection at this time.   -remainder as above.      Hypothyroidism:   chronic stable  Continue Levothyroxine.     Irritable Bowel Syndrome: no active symptoms or flare.   -restart linzess, home dose, pt to bring in her own  -continue to monitor for bms    DVT ppx: Lovenox for dvt ppx Ms Ferrer is a 82 yo F presenting from home with SOB found to have COVID-19 infection. PMH Hypothyroidism, Irritable Bowel Syndrome    COVID-19 Infection: Sepsis criteria met evolving from admission, with acute respiratory failure with hypoxia  -pt is day 8 from onset of symptoms  -NLR: 12.38  -Pt is requiring O2 supplementation as she desaturred to 86% on room air at rest, at 4L she is 90% and will likely require home oxygen for discharge even if pt decides to leave AMA will establish home O2  -apprec psych recs dr segura pt has medical decision making capacity  -cont  remdesmivir and decadron to 10mg daily, if worsens consider toci  -empiric antibx coverage of meropenem as per ID  -apprec ID recs Dr barboza recs  -Dr Cedeno pulm  -CXR shows Right upper lobe infiltrate. Thoracolumbar hardware. No indication for abx as this is a viral process. Do not suspect bacterial superinfection at this time.   -remainder as above.      Hypothyroidism:   chronic stable  Continue Levothyroxine.     Irritable Bowel Syndrome: with constipation for past 8 days as per patient  -cont linzess  -citrate mag, lactulose ordered and son to bring xlax   -continue to monitor for bms    DVT ppx: Lovenox for dvt ppx Ms Ferrer is a 80 yo F presenting from home with SOB found to have COVID-19 infection. PMH Hypothyroidism, Irritable Bowel Syndrome    COVID-19 Infection: Sepsis criteria met evolving from admission, with acute respiratory failure with hypoxia  -pt is day 8 from onset of symptoms  -NLR: 12.38  -Pt is requiring O2 supplementation as she desaturred to 86% on room air at rest, at 4L she is 90% and will likely require home oxygen for discharge even if pt decides to leave AMA will establish home O2  -apprec psych recs dr segura pt has medical decision making capacity  -cont  remdesmivir and decadron to 10mg daily, if worsens consider toci  -empiric antibx coverage of meropenem as per ID  -apprec ID recs Dr tamra jaimess  -Dr Cedeno pulm  -CXR shows Right upper lobe infiltrate. Thoracolumbar hardware. No indication for abx as this is a viral process. Do not suspect bacterial superinfection at this time.   -remainder as above.      Hypothyroidism:   chronic stable  Continue Levothyroxine.     Irritable Bowel Syndrome: with constipation for past 8 days as per patient  -cont linzess  -citrate mag, lactulose ordered and son to bring xlax   -continue to monitor for bms  -apprec GI recs Dr Farrar     DVT ppx: Lovenox for dvt ppx

## 2021-09-10 NOTE — CONSULT NOTE ADULT - REASON FOR ADMISSION
SOB, Hypoxia, suspected COVID-19 infection

## 2021-09-10 NOTE — PROGRESS NOTE ADULT - SUBJECTIVE AND OBJECTIVE BOX
[facility] DIVISION of INFECTIOUS DISEASE  Pradeep Mcmullen MD PhD, Hortensia Grewal MD, Sasha Ness MD, Meagan Patrick MD  and providing coverage with Lilly Galindo MD and Hailey Geller MD  Providing Infectious Disease Consultations at [facility]'s      Office# [phone] to schedule follow up appointments  Answering Service for urgent calls or New Consults [phone]  Cell# to text for urgent issues Pradeep Mcmullen [phone]     Infectious diseases progress note:    MICHEL TRENT is a 81y y. o. Female patient    COVID Patient    Allergies    Advil (Unknown)  allergic to food coloring (Unknown)  Ceclor (Other)  cephalosporins (Other)  ciprofloxacin (Other)  Compazine (Other)  food coloring red/blue, wheat, tide, morphine, ceclor, compazine, advil, mortim, iv contrast, shellfish, oregano, tomoatoes, pork, peas, mold/spores (Unknown)  morphine (Unknown)  Motrin (Unknown)  Originally Entered as [Rash] reaction to [oregano spice] (Unknown)  peanuts (Other)  pork (Unknown)  shellfish (Unknown)    Intolerances        ANTIBIOTICS/RELEVANT:  antimicrobials  meropenem  IVPB 1000 milliGRAM(s) IV Intermittent every 8 hours  remdesivir  IVPB 100 milliGRAM(s) IV Intermittent every 24 hours  remdesivir  IVPB   IV Intermittent     immunologic:    OTHER:  acetaminophen   Tablet .. 650 milliGRAM(s) Oral every 6 hours PRN  dexAMETHasone     Tablet 10 milliGRAM(s) Oral daily  enoxaparin Injectable 40 milliGRAM(s) SubCutaneous daily  guaiFENesin Oral Liquid (Sugar-Free) 200 milliGRAM(s) Oral every 6 hours PRN  lactulose Syrup 20 Gram(s) Oral three times a day  levothyroxine 100 MICROGram(s) Oral daily  linaclotide 290 MICROGram(s) Oral before breakfast  magnesium citrate Oral Solution 1 Bottle Oral once  ondansetron Injectable 4 milliGRAM(s) IV Push every 8 hours PRN  pantoprazole    Tablet 40 milliGRAM(s) Oral before breakfast      Objective:  Vital Signs Last 24 Hrs  T(C): 36.2 (10 Sep 2021 05:02), Max: 36.7 (09 Sep 2021 12:51)  T(F): 97.1 (10 Sep 2021 05:02), Max: 98.1 (09 Sep 2021 12:51)  HR: 64 (10 Sep 2021 05:02) (64 - 72)  BP: 119/62 (10 Sep 2021 05:02) (119/62 - 138/64)  BP(mean): --  RR: 18 (10 Sep 2021 05:02) (18 - 18)  SpO2: 94% (10 Sep 2021 05:02) (90% - 94%)    T(C): 36.2 (09-10-21 @ 05:02), Max: 37.3 (09-08-21 @ 13:47)  T(C): 36.2 (09-10-21 @ 05:02), Max: 39.3 (09-07-21 @ 21:17)  T(C): 36.2 (09-10-21 @ 05:02), Max: 39.3 (09-07-21 @ 21:17)    PHYSICAL EXAM:  HEENT: NC atraumatic  Neck: supple  Respiratory: no accessory muscle use, breathing comfortably on NRB  Cardiovascular: distant  Gastrointestinal: normal appearing, nondistended  Extremities: no clubbing, no cyanosis,        LABS:                          14.9   9.50  )-----------( 529      ( 10 Sep 2021 09:07 )             46.3       9.50 09-10 @ 09:07  11.53 09-09 @ 08:22  7.45 09-08 @ 11:20  10.76 09-07 @ 05:19      09-10    138  |  101  |  28<H>  ----------------------------<  149<H>  3.6   |  30  |  0.78    Ca    9.4      10 Sep 2021 09:07    TPro  7.9  /  Alb  2.8<L>  /  TBili  0.4  /  DBili  x   /  AST  141<H>  /  ALT  251<H>  /  AlkPhos  127<H>  09-10      Creatinine, Serum: 0.78 mg/dL (09-10-21 @ 09:07)  Creatinine, Serum: 0.81 mg/dL (09-09-21 @ 18:36)  Creatinine, Serum: 0.93 mg/dL (09-08-21 @ 13:08)  Creatinine, Serum: 0.82 mg/dL (09-07-21 @ 05:19)      PT/INR - ( 10 Sep 2021 09:07 )   PT: 14.7 sec;   INR: 1.27 ratio         PTT - ( 08 Sep 2021 17:29 )  PTT:40.6 sec          COVID RISK SCORE  Auto Neutrophil #: 8.44 K/uL (09-10-21 @ 09:07)  Auto Lymphocyte #: 0.73 K/uL (09-10-21 @ 09:07)  Auto Neutrophil #: 10.23 K/uL (09-09-21 @ 08:22)  Auto Lymphocyte #: 0.79 K/uL (09-09-21 @ 08:22)  Auto Neutrophil #: 6.11 K/uL (09-08-21 @ 11:20)  Auto Lymphocyte #: 0.87 K/uL (09-08-21 @ 11:20)  Auto Neutrophil #: 9.38 K/uL (09-07-21 @ 05:19)  Auto Lymphocyte #: 0.93 K/uL (09-07-21 @ 05:19)    Lactate, Blood: 1.1 mmol/L (09-07-21 @ 05:19)    Auto Eosinophil #: 0.00 K/uL (09-10-21 @ 09:07)  Auto Eosinophil #: 0.00 K/uL (09-09-21 @ 08:22)  Auto Eosinophil #: 0.01 K/uL (09-08-21 @ 11:20)  Auto Eosinophil #: 0.01 K/uL (09-07-21 @ 05:19)        Procalcitonin, Serum: 0.98 ng/mL (09-09-21 @ 08:22)            Ferritin, Serum: 812 ng/mL (09-09-21 @ 12:20)        INR: 1.27 ratio (09-10-21 @ 09:07)  INR: 1.28 ratio (09-09-21 @ 08:22)  INR: 1.40 ratio (09-08-21 @ 17:29)  Activated Partial Thromboplastin Time: 40.6 sec (09-08-21 @ 17:29)  Activated Partial Thromboplastin Time: 34.1 sec (09-07-21 @ 05:19)  INR: 1.13 ratio (09-07-21 @ 05:19)    D-Dimer Assay, Quantitative: 246 ng/mL DDU (09-10-21 @ 09:07)  D-Dimer Assay, Quantitative: 231 ng/mL DDU (09-09-21 @ 08:22)        MICROBIOLOGY:              RADIOLOGY & ADDITIONAL STUDIES:

## 2021-09-10 NOTE — CONSULT NOTE ADULT - SUBJECTIVE AND OBJECTIVE BOX
Date/Time Patient Seen:  		  Referring MD:   Data Reviewed	       Patient is a 81y old  Female who presents with a chief complaint of SOB, Hypoxia, suspected COVID-19 infection (08 Sep 2021 11:59)      Subjective/HPI  in bed  on o2 support  vs noted  labs reviewed  H and P reviewed  ER Provider note reviewed       History of Present Illness:  Reason for Admission: SOB, Hypoxia, suspected COVID-19 infection  History of Present Illness:   Ms Ferrer is a 82 yo F presenting from home with SOB with a recent COVID-19 exposure [her son]. PMH Hypothyroidism, Irritable Bowel Syndrome  Patient seen and examined at bedside. She reports having SOB and cough for about 4 days now. Her son was COVID-19 positive recently and was deemed "not contagious" as of August 29. Patient developed SOB and non-productive cough about 5 days ago, she also had a fever of 101 F yesterday. She is not vaccinated against COVID-19 because she has many allergies and feels she will be allergic to vaccine components. Denies chills, light-headedness, dizziness, headaches, nausea, vomiting, chest pain, palpitations, abdominal pain, constipation, diarrhea, melena, hematochezia, dysuria.   Patients COVID-19 PCR is still pending from the ER. CXR shows elevated R hemidiaphragm, R upper perihilar infiltrate [?] [official read pending]  PAST MEDICAL & SURGICAL HISTORY:  Chronic Back Pain    Hypothyroidism    Edema    Cataract    Spastic colon    Lumbar back pain    Constipation    Osteoarthritis    MRSA cellulitis    MRSA colonization    Diabetes    S/P Hysterectomy  1987    S/P Cholecystectomy  1968    S/P Appendectomy  1968    S/P TKR (Total Knee Replacement)  Right 2015    S/P TKR (total knee replacement)  Left 2009    H/O Spinal surgery  2009    H/O sinus surgery  2/2017    [place] filter in place  2009          Medication list         MEDICATIONS  (STANDING):  dexAMETHasone     Tablet 6 milliGRAM(s) Oral daily  enoxaparin Injectable 40 milliGRAM(s) SubCutaneous daily  levothyroxine 100 MICROGram(s) Oral daily  pantoprazole    Tablet 40 milliGRAM(s) Oral before breakfast  remdesivir  IVPB   IV Intermittent     MEDICATIONS  (PRN):  acetaminophen   Tablet .. 650 milliGRAM(s) Oral every 6 hours PRN Temp greater or equal to 38.5C (101.3F), Mild Pain (1 - 3)  ondansetron Injectable 4 milliGRAM(s) IV Push every 8 hours PRN Nausea and/or Vomiting         Vitals log        ICU Vital Signs Last 24 Hrs  T(C): 37.3 (08 Sep 2021 13:47), Max: 39.3 (07 Sep 2021 21:17)  T(F): 99.1 (08 Sep 2021 13:47), Max: 102.8 (07 Sep 2021 21:17)  HR: 100 (08 Sep 2021 13:47) (84 - 100)  BP: 156/74 (08 Sep 2021 13:47) (122/66 - 156/74)  BP(mean): --  ABP: --  ABP(mean): --  RR: 18 (08 Sep 2021 13:47) (17 - 18)  SpO2: 90% (08 Sep 2021 13:47) (85% - 93%)     FAMILY HISTORY:  No pertinent family history in first degree relatives. No pertinent family history of: no lung disease in mother or father.     Social History:  Social History (marital status, living situation, occupation, tobacco use, alcohol and drug use, and sexual history): Denies use of etoh, tobacco and drug use.     Tobacco Screening:  · Core Measure Site	Yes  · Has the patient used tobacco in the past 30 days?	No    Risk Assessment:    Present on Admission:  Deep Venous Thrombosis	no  Pulmonary Embolus	no     Heart Failure:  Does this patient have a history of or has been diagnosed with heart failure? no.      Input and Output:  I&O's Detail    08 Sep 2021 07:01  -  08 Sep 2021 14:18  --------------------------------------------------------  IN:    Oral Fluid: 300 mL  Total IN: 300 mL    OUT:  Total OUT: 0 mL    Total NET: 300 mL          Lab Data                        13.7   7.45  )-----------( 297      ( 08 Sep 2021 11:20 )             41.8     09-08    132<L>  |  100  |  18  ----------------------------<  72  4.3   |  21<L>  |  0.93    Ca    8.4<L>      08 Sep 2021 13:08    TPro  7.1  /  Alb  2.3<L>  /  TBili  0.3  /  DBili  x   /  AST  83<H>  /  ALT  100<H>  /  AlkPhos  107  09-08            Review of Systems	  weakness  cough  sob  dec PO intake    Objective     Physical Examination  on o2 support  head nc  heart s1s2  lung dec BS  weak  verbal      Pertinent Lab findings & Imaging      [place]:  NO   Adequate UO     I&O's Detail    08 Sep 2021 07:01  -  08 Sep 2021 14:18  --------------------------------------------------------  IN:    Oral Fluid: 300 mL  Total IN: 300 mL    OUT:  Total OUT: 0 mL    Total NET: 300 mL               Discussed with:     Cultures:	        Radiology      EXAM:  XR CHEST PORTABLE IMMED 1V                            PROCEDURE DATE:  09/07/2021          INTERPRETATION:  AP chest on September 7, 2021 at 5:05 AM. Patient is short of breath with cough and fever.    Heart size is within normal limits. Elevated left hemidiaphragm again noted.    On October 27, 2009 there was some linear atelectasis at the left base.    Present film shows a right upper lobe infiltrate and extensive thoracolumbar hardware.    IMPRESSION: Right upper lobe infiltrate. Thoracolumbar hardware.    --- End of Report ---            VIRIDIANA FLORES MD; Attending Radiologist  This document has been electronically signed. Sep  7 2021 10:52AM                        
[facility]  Dayo Nath PA-C  [facility] [place] [address]  [phone]      Chief Complaint:  Patient is a 81y old  Female who presents with a chief complaint of SOB, Hypoxia, suspected COVID-19 infection (10 Sep 2021 11:03)      HPI:Ms Ferrer is a 82 yo F presenting from home with SOB found to have COVID-19 infection. PMH Hypothyroidism, Irritable Bowel Syndrome    Allergies:  Advil (Unknown)  allergic to food coloring (Unknown)  Ceclor (Other)  cephalosporins (Other)  ciprofloxacin (Other)  Compazine (Other)  food coloring red/blue, wheat, tide, morphine, ceclor, compazine, advil, mortim, iv contrast, shellfish, oregano, tomoatoes, pork, peas, mold/spores (Unknown)  morphine (Unknown)  Motrin (Unknown)  Originally Entered as [Rash] reaction to [oregano spice] (Unknown)  peanuts (Other)  pork (Unknown)  shellfish (Unknown)      Medications:  acetaminophen   Tablet .. 650 milliGRAM(s) Oral every 6 hours PRN  dexAMETHasone     Tablet 10 milliGRAM(s) Oral daily  enoxaparin Injectable 40 milliGRAM(s) SubCutaneous daily  guaiFENesin Oral Liquid (Sugar-Free) 200 milliGRAM(s) Oral every 6 hours PRN  lactulose Syrup 20 Gram(s) Oral three times a day  levothyroxine 100 MICROGram(s) Oral daily  linaclotide 290 MICROGram(s) Oral before breakfast  meropenem  IVPB 1000 milliGRAM(s) IV Intermittent every 8 hours  ondansetron Injectable 4 milliGRAM(s) IV Push every 8 hours PRN  pantoprazole    Tablet 40 milliGRAM(s) Oral before breakfast  remdesivir  IVPB 100 milliGRAM(s) IV Intermittent every 24 hours  remdesivir  IVPB   IV Intermittent       PMHX/PSHX:  Chronic Back Pain    Hypothyroidism    Edema    Cataract    Spastic colon    Lumbar back pain    Constipation    Osteoarthritis    MRSA cellulitis    MRSA colonization    Diabetes    S/P Hysterectomy    S/P Cholecystectomy    S/P Appendectomy    S/P TKR (Total Knee Replacement)    S/P TKR (total knee replacement)    H/O Spinal surgery    H/O sinus surgery    [place] filter in place        Family history:  No pertinent family history in first degree relatives    No pertinent family history in first degree relatives        Social History:     ROS:     General:  No wt loss, fevers, chills, night sweats, fatigue,   Eyes:  Good vision, no reported pain  ENT:  No sore throat, pain, runny nose, dysphagia  CV:  No pain, palpitations, hypo/hypertension  Resp:  No dyspnea, cough, tachypnea, wheezing  GI:  No pain, No nausea, No vomiting, No diarrhea, No constipation, No weight loss, No fever, No pruritis, No rectal bleeding, No tarry stools, No dysphagia,  :  No pain, bleeding, incontinence, nocturia  Muscle:  No pain, weakness  Neuro:  No weakness, tingling, memory problems  Psych:  No fatigue, insomnia, mood problems, depression  Endocrine:  No polyuria, polydipsia, cold/heat intolerance  Heme:  No petechiae, ecchymosis, easy bruisability  Skin:  No rash, tattoos, scars, edema      PHYSICAL EXAM:   Vital Signs:  Vital Signs Last 24 Hrs  T(C): 36.9 (10 Sep 2021 13:17), Max: 36.9 (10 Sep 2021 13:17)  T(F): 98.5 (10 Sep 2021 13:17), Max: 98.5 (10 Sep 2021 13:17)  HR: 68 (10 Sep 2021 13:17) (64 - 72)  BP: 124/64 (10 Sep 2021 13:17) (119/62 - 138/64)  BP(mean): --  RR: 17 (10 Sep 2021 13:17) (17 - 18)  SpO2: 90% (10 Sep 2021 13:17) (86% - 94%)  Daily     Daily Weight in k.4 (10 Sep 2021 05:02)    GENERAL:  Appears stated age,   HEENT:  NC/AT,    CHEST:  Full & symmetric excursion,   HEART:  Regular rhythm  ABDOMEN:  Soft, non-tender, non-distended,   EXTEREMITIES:  no cyanosis,clubbing or edema  SKIN:  No rash  NEURO:  Alert,    LABS:                        14.3   9.82  )-----------( 487      ( 10 Sep 2021 12:51 )             44.0     09-10    139  |  104  |  30<H>  ----------------------------<  196<H>  4.1   |  29  |  0.70    Ca    9.1      10 Sep 2021 12:51    TPro  7.9  /  Alb  2.8<L>  /  TBili  0.4  /  DBili  x   /  AST  141<H>  /  ALT  251<H>  /  AlkPhos  127<H>  09-10    LIVER FUNCTIONS - ( 10 Sep 2021 09:07 )  Alb: 2.8 g/dL / Pro: 7.9 g/dL / ALK PHOS: 127 U/L / ALT: 251 U/L / AST: 141 U/L / GGT: x           PT/INR - ( 10 Sep 2021 09:07 )   PT: 14.7 sec;   INR: 1.27 ratio         PTT - ( 08 Sep 2021 17:29 )  PTT:40.6 sec        Imaging:          
[facility] DIVISION of  INFECTIOUS DISEASE  Pradeep Mcmullen MD PhD, Hortensia Grewal MD, Sasha Ness MD, Meagan Patrick MD  and providing coverage with Lilly Galindo MD and Hailey Geller MD  Providing Infectious Disease Consultations at [facility], [facility], [facility]'s    Office# [phone] to schedule follow up appointments  Answering Service for urgent calls or New Consults [phone]  Cell# to text for urgent issues Pradeep Mcmullen [phone]     HPI:  Ms Ferrer is a 80 yo F PMH Hypothyroidism, Irritable Bowel Syndrome presenting from home with SOB with a recent COVID-19 exposure [her son] who was diagnosed with COVID here at  but then sent home and his unvaccinated mother cared for him.  She reports having SOB and cough for that started 8/30.  She is not vaccinated against COVID-19 because she has many allergies and feels she will be allergic to vaccine components. Denies chills, light-headedness, dizziness, headaches, nausea, vomiting, chest pain, palpitations, abdominal pain, constipation, diarrhea, melena, hematochezia, dysuria.       PAST MEDICAL & SURGICAL HISTORY:  Chronic Back Pain  Hypothyroidism  Edema  Cataract  Spastic colon  Lumbar back pain  Constipation  Osteoarthritis  MRSA colonization  Diabetes    S/P Hysterectomy  1987    S/P Cholecystectomy  1968    S/P Appendectomy  1968    S/P TKR (Total Knee Replacement)  Right 2015    S/P TKR (total knee replacement)  Left 2009    H/O Spinal surgery  2009    H/O sinus surgery  2/2017    [place] filter in place  2009        Antimicrobials  remdesivir  IVPB 200 milliGRAM(s) IV Intermittent every 24 hours  remdesivir  IVPB   IV Intermittent       Immunological      Other  acetaminophen   Tablet .. 650 milliGRAM(s) Oral every 6 hours PRN  dexAMETHasone     Tablet 6 milliGRAM(s) Oral daily  enoxaparin Injectable 40 milliGRAM(s) SubCutaneous daily  levothyroxine 100 MICROGram(s) Oral daily      Allergies    Advil (Unknown)  allergic to food coloring (Unknown)  Ceclor (Other)  cephalosporins (Other)  ciprofloxacin (Other)  Compazine (Other)  food coloring red/blue, wheat, tide, morphine, ceclor, compazine, advil, mortim, iv contrast, shellfish, oregano, tomoatoes, pork, peas, mold/spores (Unknown)  morphine (Unknown)  Motrin (Unknown)  Originally Entered as [Rash] reaction to [oregano spice] (Unknown)  peanuts (Other)  pork (Unknown)  shellfish (Unknown)    Intolerances        SOCIAL HISTORY:  Denies use of etoh, tobacco and drug use. (07 Sep 2021 08:38)      FAMILY HISTORY:  No pertinent family history in first degree relatives        ROS:    limited    Vital Signs Last 24 Hrs  T(C): 38.1 (08 Sep 2021 05:19), Max: 39.3 (07 Sep 2021 21:17)  T(F): 100.5 (08 Sep 2021 05:19), Max: 102.8 (07 Sep 2021 21:17)  HR: 99 (08 Sep 2021 05:19) (77 - 99)  BP: 122/66 (08 Sep 2021 05:19) (122/66 - 153/67)  BP(mean): --  RR: 18 (08 Sep 2021 10:18) (16 - 18)  SpO2: 91% (08 Sep 2021 10:18) (85% - 97%)    PE:    HEENT:  NC, atraumatic hair thining  Lungs:  No accessory muscle use, breathing comfortably  Cor:  distant  Abd:  Symmetric, appears normal,   Extrem:  No cyanosis        LABS:                        13.7   7.45  )-----------( 297      ( 08 Sep 2021 11:20 )             41.8       WBC Count: 7.45 K/uL (09-08-21 @ 11:20)  WBC Count: 10.76 K/uL (09-07-21 @ 05:19)      09-07    136  |  98  |  16  ----------------------------<  105<H>  4.5   |  33<H>  |  0.82    Ca    8.8      07 Sep 2021 05:19    TPro  7.2  /  Alb  2.8<L>  /  TBili  0.4  /  DBili  x   /  AST  49<H>  /  ALT  67  /  AlkPhos  116  09-07      Creatinine, Serum: 0.82 mg/dL (09-07-21 @ 05:19)              COVID RISK SCORE:  Auto Neutrophil #: 6.11 K/uL (09-08-21 @ 11:20)  Auto Lymphocyte #: 0.87 K/uL (09-08-21 @ 11:20)  Auto Neutrophil #: 9.38 K/uL (09-07-21 @ 05:19)  Auto Lymphocyte #: 0.93 K/uL (09-07-21 @ 05:19)    Lactate, Blood: 1.1 mmol/L (09-07-21 @ 05:19)    Auto Eosinophil #: 0.01 K/uL (09-08-21 @ 11:20)  Auto Eosinophil #: 0.01 K/uL (09-07-21 @ 05:19)      Activated Partial Thromboplastin Time: 34.1 sec (09-07-21 @ 05:19)  INR: 1.13 ratio (09-07-21 @ 05:19)          MICROBIOLOGY:    COVID-19 PCR: Detected (07 Sep 2021 04:49)      RADIOLOGY & ADDITIONAL STUDIES:    --< from: Xray Chest 1 View-PORTABLE IMMEDIATE (09.07.21 @ 05:20) >    EXAM:  XR CHEST PORTABLE IMMED 1V                            PROCEDURE DATE:  09/07/2021          INTERPRETATION:  AP chest on September 7, 2021 at 5:05 AM. Patient is short of breath with cough and fever.    Heart size is within normal limits. Elevated left hemidiaphragm again noted.    On October 27, 2009 there was some linear atelectasis at the left base.    Present film shows a right upper lobe infiltrate and extensive thoracolumbar hardware.    IMPRESSION: Right upper lobe infiltrate. Thoracolumbar hardware.

## 2021-09-10 NOTE — BH CONSULTATION LIAISON ASSESSMENT NOTE - NSBHCHARTREVIEWVS_PSY_A_CORE FT
Vital Signs Last 24 Hrs  T(C): 36.9 (10 Sep 2021 13:17), Max: 36.9 (10 Sep 2021 13:17)  T(F): 98.5 (10 Sep 2021 13:17), Max: 98.5 (10 Sep 2021 13:17)  HR: 68 (10 Sep 2021 13:17) (64 - 72)  BP: 124/64 (10 Sep 2021 13:17) (119/62 - 138/64)  BP(mean): --  RR: 17 (10 Sep 2021 13:17) (17 - 18)  SpO2: 90% (10 Sep 2021 13:17) (86% - 94%)

## 2021-09-10 NOTE — PROGRESS NOTE ADULT - ASSESSMENT
80yo female who presents with cough, fever for the last 4-5 days. pt  states her son had covid several weeks ago and was cleared on aug 29 and pt has been sick for the last 4-5 days, dry cough, fever tonight of 101 and vomiting no sob, pt denies any other complaints, pt is not vaccinated due to her multiple allergies    d dimer noted  ID f/u noted  on Meropenem   on Covid Rx regimen  on o2 support      monitor LFTs  DVT p  Remdesivir and Decadron for covid viral pna  CXR noted - LABS reviewed  monitor VS and HD  titrate fio2 support - keep sat > 88 pct  pronate as tolerated  tylenol - robitussin PRN for sx management  isolation precs  supportive medical regimen - nutrition - assist with needs

## 2021-09-10 NOTE — PROGRESS NOTE ADULT - SUBJECTIVE AND OBJECTIVE BOX
Patient is a 81y old  Female who presents with a chief complaint of SOB, Hypoxia, suspected COVID-19 infection (10 Sep 2021 11:03)      INTERVAL HPI: Pt seen and examined. States she feels like she is improving even though she is requiring venturi mask from last night. Denies any other acute complaints other than not having a bm in 8 days and stating she feels covid is worsening her IBS, despit linzess. Pt states she takes a regimen of magnesium citrate, 4 exlaxx and lactulose every hour until she goes.     OVERNIGHT EVENTS: none noted  T(F): 98.5 (09-10-21 @ 13:17), Max: 98.5 (09-10-21 @ 13:17)  HR: 68 (09-10-21 @ 13:17) (64 - 72)  BP: 124/64 (09-10-21 @ 13:17) (119/62 - 138/64)  RR: 17 (09-10-21 @ 13:17) (17 - 18)  SpO2: 90% (09-10-21 @ 13:17) (86% - 94%)  I&O's Summary      REVIEW OF SYSTEMS:  comprehensive ros negative except as above in hpi     PHYSICAL EXAM:  GENERAL: mild to mod resp distress speaking in shortened sentences, thin, elder  HEAD:  Atraumatic, Normocephalic  EYES: EOMI, PERRLA, conjunctiva and sclera clear  ENMT: No tonsillar erythema, exudates, or enlargement; Moist mucous membranes, Good dentition, No lesions  NERVOUS SYSTEM:  Alert & Oriented X3, Good concentration; Motor Strength 4/5 B/L upper and lower extremities  CHEST/LUNG: diminished b/l bases on auscultation,  prominent sob with exertion  HEART: Regular rate and rhythm; No murmurs, rubs, or gallops  ABDOMEN: Soft, Nontender, Nondistended; Bowel sounds present  EXTREMITIES:  2+ Peripheral Pulses, No clubbing, cyanosis, or edema  SKIN: No rashes or lesions    LABS:                        14.3   9.82  )-----------( 487      ( 10 Sep 2021 12:51 )             44.0     09-10    139  |  104  |  30<H>  ----------------------------<  196<H>  4.1   |  29  |  0.70    Ca    9.1      10 Sep 2021 12:51    TPro  7.9  /  Alb  2.8<L>  /  TBili  0.4  /  DBili  x   /  AST  141<H>  /  ALT  251<H>  /  AlkPhos  127<H>  09-10    PT/INR - ( 10 Sep 2021 09:07 )   PT: 14.7 sec;   INR: 1.27 ratio         PTT - ( 08 Sep 2021 17:29 )  PTT:40.6 sec    CAPILLARY BLOOD GLUCOSE          09-07 @ 08:53   No growth to date.  --  --          MEDICATIONS  (STANDING):  dexAMETHasone     Tablet 10 milliGRAM(s) Oral daily  enoxaparin Injectable 40 milliGRAM(s) SubCutaneous daily  lactulose Syrup 20 Gram(s) Oral three times a day  levothyroxine 100 MICROGram(s) Oral daily  linaclotide 290 MICROGram(s) Oral before breakfast  magnesium citrate Oral Solution 1 Bottle Oral once  meropenem  IVPB 1000 milliGRAM(s) IV Intermittent every 8 hours  pantoprazole    Tablet 40 milliGRAM(s) Oral before breakfast  remdesivir  IVPB 100 milliGRAM(s) IV Intermittent every 24 hours  remdesivir  IVPB   IV Intermittent     MEDICATIONS  (PRN):  acetaminophen   Tablet .. 650 milliGRAM(s) Oral every 6 hours PRN Temp greater or equal to 38.5C (101.3F), Mild Pain (1 - 3)  guaiFENesin Oral Liquid (Sugar-Free) 200 milliGRAM(s) Oral every 6 hours PRN Cough  ondansetron Injectable 4 milliGRAM(s) IV Push every 8 hours PRN Nausea and/or Vomiting       Patient is a 81y old  Female who presents with a chief complaint of SOB, Hypoxia, suspected COVID-19 infection (10 Sep 2021 11:03)      INTERVAL HPI: Pt seen and examined. States she feels like she is improving even though she is requiring venturi mask from last night. Denies any other acute complaints other than not having a bm in 8 days and stating she feels covid is worsening her IBS, despit linzess. Pt states she takes a regimen of magnesium citrate, 4 exlaxx and lactulose every hour until she goes. Pt also called after the encounter to this writer's work cell complaining she is a vegetarian and wants her diet adjusted.     OVERNIGHT EVENTS: none noted  T(F): 98.5 (09-10-21 @ 13:17), Max: 98.5 (09-10-21 @ 13:17)  HR: 68 (09-10-21 @ 13:17) (64 - 72)  BP: 124/64 (09-10-21 @ 13:17) (119/62 - 138/64)  RR: 17 (09-10-21 @ 13:17) (17 - 18)  SpO2: 90% (09-10-21 @ 13:17) (86% - 94%)  I&O's Summary      REVIEW OF SYSTEMS:  comprehensive ros negative except as above in hpi     PHYSICAL EXAM:  GENERAL: mild to mod resp distress speaking in shortened sentences, thin, elder  HEAD:  Atraumatic, Normocephalic  EYES: EOMI, PERRLA, conjunctiva and sclera clear  ENMT: No tonsillar erythema, exudates, or enlargement; Moist mucous membranes, Good dentition, No lesions  NERVOUS SYSTEM:  Alert & Oriented X3, Good concentration; Motor Strength 4/5 B/L upper and lower extremities  CHEST/LUNG: diminished b/l bases on auscultation,  prominent sob with exertion  HEART: Regular rate and rhythm; No murmurs, rubs, or gallops  ABDOMEN: Soft, Nontender, Nondistended; Bowel sounds present  EXTREMITIES:  2+ Peripheral Pulses, No clubbing, cyanosis, or edema  SKIN: No rashes or lesions    LABS:                        14.3   9.82  )-----------( 487      ( 10 Sep 2021 12:51 )             44.0     09-10    139  |  104  |  30<H>  ----------------------------<  196<H>  4.1   |  29  |  0.70    Ca    9.1      10 Sep 2021 12:51    TPro  7.9  /  Alb  2.8<L>  /  TBili  0.4  /  DBili  x   /  AST  141<H>  /  ALT  251<H>  /  AlkPhos  127<H>  09-10    PT/INR - ( 10 Sep 2021 09:07 )   PT: 14.7 sec;   INR: 1.27 ratio         PTT - ( 08 Sep 2021 17:29 )  PTT:40.6 sec    CAPILLARY BLOOD GLUCOSE          09-07 @ 08:53   No growth to date.  --  --          MEDICATIONS  (STANDING):  dexAMETHasone     Tablet 10 milliGRAM(s) Oral daily  enoxaparin Injectable 40 milliGRAM(s) SubCutaneous daily  lactulose Syrup 20 Gram(s) Oral three times a day  levothyroxine 100 MICROGram(s) Oral daily  linaclotide 290 MICROGram(s) Oral before breakfast  magnesium citrate Oral Solution 1 Bottle Oral once  meropenem  IVPB 1000 milliGRAM(s) IV Intermittent every 8 hours  pantoprazole    Tablet 40 milliGRAM(s) Oral before breakfast  remdesivir  IVPB 100 milliGRAM(s) IV Intermittent every 24 hours  remdesivir  IVPB   IV Intermittent     MEDICATIONS  (PRN):  acetaminophen   Tablet .. 650 milliGRAM(s) Oral every 6 hours PRN Temp greater or equal to 38.5C (101.3F), Mild Pain (1 - 3)  guaiFENesin Oral Liquid (Sugar-Free) 200 milliGRAM(s) Oral every 6 hours PRN Cough  ondansetron Injectable 4 milliGRAM(s) IV Push every 8 hours PRN Nausea and/or Vomiting       Patient is a 81y old  Female who presents with a chief complaint of SOB, Hypoxia, suspected COVID-19 infection (10 Sep 2021 11:03)      INTERVAL HPI: Pt seen and examined. States she feels like she is improving even though she is requiring venturi mask from last night. Denies any other acute complaints other than not having a bm in 8 days and stating she feels covid is worsening her IBS, despite linzess. Pt states she takes a regimen of magnesium citrate, 4 exlaxx and lactulose every hour until she goes. Pt also called after the encounter to this writer's work cell complaining she is a vegetarian and wants her diet adjusted.     OVERNIGHT EVENTS: none noted  T(F): 98.5 (09-10-21 @ 13:17), Max: 98.5 (09-10-21 @ 13:17)  HR: 68 (09-10-21 @ 13:17) (64 - 72)  BP: 124/64 (09-10-21 @ 13:17) (119/62 - 138/64)  RR: 17 (09-10-21 @ 13:17) (17 - 18)  SpO2: 90% (09-10-21 @ 13:17) (86% - 94%)  I&O's Summary      REVIEW OF SYSTEMS:  comprehensive ros negative except as above in hpi     PHYSICAL EXAM:  GENERAL: mild to mod resp distress speaking in shortened sentences, thin, elder  HEAD:  Atraumatic, Normocephalic  EYES: EOMI, PERRLA, conjunctiva and sclera clear  ENMT: No tonsillar erythema, exudates, or enlargement; Moist mucous membranes, Good dentition, No lesions  NERVOUS SYSTEM:  Alert & Oriented X3, Good concentration; Motor Strength 4/5 B/L upper and lower extremities  CHEST/LUNG: diminished b/l bases on auscultation,  prominent sob with exertion  HEART: Regular rate and rhythm; No murmurs, rubs, or gallops  ABDOMEN: Soft, Nontender, Nondistended; Bowel sounds present  EXTREMITIES:  2+ Peripheral Pulses, No clubbing, cyanosis, or edema  SKIN: No rashes or lesions    LABS:                        14.3   9.82  )-----------( 487      ( 10 Sep 2021 12:51 )             44.0     09-10    139  |  104  |  30<H>  ----------------------------<  196<H>  4.1   |  29  |  0.70    Ca    9.1      10 Sep 2021 12:51    TPro  7.9  /  Alb  2.8<L>  /  TBili  0.4  /  DBili  x   /  AST  141<H>  /  ALT  251<H>  /  AlkPhos  127<H>  09-10    PT/INR - ( 10 Sep 2021 09:07 )   PT: 14.7 sec;   INR: 1.27 ratio         PTT - ( 08 Sep 2021 17:29 )  PTT:40.6 sec    CAPILLARY BLOOD GLUCOSE          09-07 @ 08:53   No growth to date.  --  --          MEDICATIONS  (STANDING):  dexAMETHasone     Tablet 10 milliGRAM(s) Oral daily  enoxaparin Injectable 40 milliGRAM(s) SubCutaneous daily  lactulose Syrup 20 Gram(s) Oral three times a day  levothyroxine 100 MICROGram(s) Oral daily  linaclotide 290 MICROGram(s) Oral before breakfast  magnesium citrate Oral Solution 1 Bottle Oral once  meropenem  IVPB 1000 milliGRAM(s) IV Intermittent every 8 hours  pantoprazole    Tablet 40 milliGRAM(s) Oral before breakfast  remdesivir  IVPB 100 milliGRAM(s) IV Intermittent every 24 hours  remdesivir  IVPB   IV Intermittent     MEDICATIONS  (PRN):  acetaminophen   Tablet .. 650 milliGRAM(s) Oral every 6 hours PRN Temp greater or equal to 38.5C (101.3F), Mild Pain (1 - 3)  guaiFENesin Oral Liquid (Sugar-Free) 200 milliGRAM(s) Oral every 6 hours PRN Cough  ondansetron Injectable 4 milliGRAM(s) IV Push every 8 hours PRN Nausea and/or Vomiting

## 2021-09-11 LAB
ALBUMIN SERPL ELPH-MCNC: 2.3 G/DL — LOW (ref 3.3–5)
ALP SERPL-CCNC: 118 U/L — SIGNIFICANT CHANGE UP (ref 40–120)
ALT FLD-CCNC: 464 U/L — HIGH (ref 12–78)
ANION GAP SERPL CALC-SCNC: 6 MMOL/L — SIGNIFICANT CHANGE UP (ref 5–17)
AST SERPL-CCNC: 210 U/L — HIGH (ref 15–37)
BASOPHILS # BLD AUTO: 0.01 K/UL — SIGNIFICANT CHANGE UP (ref 0–0.2)
BASOPHILS NFR BLD AUTO: 0.1 % — SIGNIFICANT CHANGE UP (ref 0–2)
BILIRUB SERPL-MCNC: 0.5 MG/DL — SIGNIFICANT CHANGE UP (ref 0.2–1.2)
BUN SERPL-MCNC: 26 MG/DL — HIGH (ref 7–23)
CALCIUM SERPL-MCNC: 8.8 MG/DL — SIGNIFICANT CHANGE UP (ref 8.5–10.1)
CHLORIDE SERPL-SCNC: 105 MMOL/L — SIGNIFICANT CHANGE UP (ref 96–108)
CO2 SERPL-SCNC: 30 MMOL/L — SIGNIFICANT CHANGE UP (ref 22–31)
CREAT SERPL-MCNC: 0.67 MG/DL — SIGNIFICANT CHANGE UP (ref 0.5–1.3)
EOSINOPHIL # BLD AUTO: 0.01 K/UL — SIGNIFICANT CHANGE UP (ref 0–0.5)
EOSINOPHIL NFR BLD AUTO: 0.1 % — SIGNIFICANT CHANGE UP (ref 0–6)
GLUCOSE SERPL-MCNC: 90 MG/DL — SIGNIFICANT CHANGE UP (ref 70–99)
HCT VFR BLD CALC: 42.6 % — SIGNIFICANT CHANGE UP (ref 34.5–45)
HGB BLD-MCNC: 13.8 G/DL — SIGNIFICANT CHANGE UP (ref 11.5–15.5)
IMM GRANULOCYTES NFR BLD AUTO: 0.6 % — SIGNIFICANT CHANGE UP (ref 0–1.5)
INR BLD: 1.23 RATIO — HIGH (ref 0.88–1.16)
LEGIONELLA AG UR QL: NEGATIVE — SIGNIFICANT CHANGE UP
LYMPHOCYTES # BLD AUTO: 0.97 K/UL — LOW (ref 1–3.3)
LYMPHOCYTES # BLD AUTO: 5.5 % — LOW (ref 13–44)
MCHC RBC-ENTMCNC: 30.1 PG — SIGNIFICANT CHANGE UP (ref 27–34)
MCHC RBC-ENTMCNC: 32.4 GM/DL — SIGNIFICANT CHANGE UP (ref 32–36)
MCV RBC AUTO: 92.8 FL — SIGNIFICANT CHANGE UP (ref 80–100)
MONOCYTES # BLD AUTO: 1.13 K/UL — HIGH (ref 0–0.9)
MONOCYTES NFR BLD AUTO: 6.4 % — SIGNIFICANT CHANGE UP (ref 2–14)
NEUTROPHILS # BLD AUTO: 15.51 K/UL — HIGH (ref 1.8–7.4)
NEUTROPHILS NFR BLD AUTO: 87.3 % — HIGH (ref 43–77)
NRBC # BLD: 0 /100 WBCS — SIGNIFICANT CHANGE UP (ref 0–0)
PLATELET # BLD AUTO: 463 K/UL — HIGH (ref 150–400)
POTASSIUM SERPL-MCNC: 4 MMOL/L — SIGNIFICANT CHANGE UP (ref 3.5–5.3)
POTASSIUM SERPL-SCNC: 4 MMOL/L — SIGNIFICANT CHANGE UP (ref 3.5–5.3)
PROT SERPL-MCNC: 6.4 G/DL — SIGNIFICANT CHANGE UP (ref 6–8.3)
PROTHROM AB SERPL-ACNC: 14.3 SEC — HIGH (ref 10.6–13.6)
RBC # BLD: 4.59 M/UL — SIGNIFICANT CHANGE UP (ref 3.8–5.2)
RBC # FLD: 13 % — SIGNIFICANT CHANGE UP (ref 10.3–14.5)
SODIUM SERPL-SCNC: 141 MMOL/L — SIGNIFICANT CHANGE UP (ref 135–145)
WBC # BLD: 17.73 K/UL — HIGH (ref 3.8–10.5)
WBC # FLD AUTO: 17.73 K/UL — HIGH (ref 3.8–10.5)

## 2021-09-11 PROCEDURE — 99232 SBSQ HOSP IP/OBS MODERATE 35: CPT | Mod: GC

## 2021-09-11 RX ADMIN — PANTOPRAZOLE SODIUM 40 MILLIGRAM(S): 20 TABLET, DELAYED RELEASE ORAL at 08:10

## 2021-09-11 RX ADMIN — Medication 100 MICROGRAM(S): at 05:38

## 2021-09-11 RX ADMIN — LACTULOSE 20 GRAM(S): 10 SOLUTION ORAL at 21:56

## 2021-09-11 RX ADMIN — Medication 200 MILLIGRAM(S): at 04:39

## 2021-09-11 RX ADMIN — MEROPENEM 100 MILLIGRAM(S): 1 INJECTION INTRAVENOUS at 16:15

## 2021-09-11 RX ADMIN — LACTULOSE 20 GRAM(S): 10 SOLUTION ORAL at 05:38

## 2021-09-11 RX ADMIN — ENOXAPARIN SODIUM 40 MILLIGRAM(S): 100 INJECTION SUBCUTANEOUS at 11:22

## 2021-09-11 RX ADMIN — LINACLOTIDE 290 MICROGRAM(S): 145 CAPSULE, GELATIN COATED ORAL at 08:10

## 2021-09-11 RX ADMIN — MEROPENEM 100 MILLIGRAM(S): 1 INJECTION INTRAVENOUS at 00:56

## 2021-09-11 RX ADMIN — REMDESIVIR 500 MILLIGRAM(S): 5 INJECTION INTRAVENOUS at 11:21

## 2021-09-11 RX ADMIN — MEROPENEM 100 MILLIGRAM(S): 1 INJECTION INTRAVENOUS at 08:55

## 2021-09-11 RX ADMIN — LACTULOSE 20 GRAM(S): 10 SOLUTION ORAL at 16:15

## 2021-09-11 RX ADMIN — MEROPENEM 100 MILLIGRAM(S): 1 INJECTION INTRAVENOUS at 21:57

## 2021-09-11 RX ADMIN — Medication 10 MILLIGRAM(S): at 07:38

## 2021-09-11 NOTE — PROGRESS NOTE ADULT - SUBJECTIVE AND OBJECTIVE BOX
Date/Time Patient Seen:  		  Referring MD:   Data Reviewed	       Patient is a 81y old  Female who presents with a chief complaint of SOB, Hypoxia, suspected COVID-19 infection (10 Sep 2021 17:13)      Subjective/HPI     PAST MEDICAL & SURGICAL HISTORY:  Chronic Back Pain    Hypothyroidism    Edema    Cataract    Spastic colon    Lumbar back pain    Constipation    Osteoarthritis    MRSA cellulitis    MRSA colonization    Diabetes    S/P Hysterectomy  1987    S/P Cholecystectomy  1968    S/P Appendectomy  1968    S/P TKR (Total Knee Replacement)  Right 2015    S/P TKR (total knee replacement)  Left 2009    H/O Spinal surgery  2009    H/O sinus surgery  2/2017    [place] filter in place  2009          Medication list         MEDICATIONS  (STANDING):  dexAMETHasone     Tablet 10 milliGRAM(s) Oral daily  enoxaparin Injectable 40 milliGRAM(s) SubCutaneous daily  lactulose Syrup 20 Gram(s) Oral three times a day  levothyroxine 100 MICROGram(s) Oral daily  linaclotide 290 MICROGram(s) Oral before breakfast  meropenem  IVPB 1000 milliGRAM(s) IV Intermittent every 8 hours  pantoprazole    Tablet 40 milliGRAM(s) Oral before breakfast  remdesivir  IVPB 100 milliGRAM(s) IV Intermittent every 24 hours  remdesivir  IVPB   IV Intermittent     MEDICATIONS  (PRN):  acetaminophen   Tablet .. 650 milliGRAM(s) Oral every 6 hours PRN Temp greater or equal to 38.5C (101.3F), Mild Pain (1 - 3)  guaiFENesin Oral Liquid (Sugar-Free) 200 milliGRAM(s) Oral every 6 hours PRN Cough  ondansetron Injectable 4 milliGRAM(s) IV Push every 8 hours PRN Nausea and/or Vomiting         Vitals log        ICU Vital Signs Last 24 Hrs  T(C): 36.6 (10 Sep 2021 20:56), Max: 36.9 (10 Sep 2021 13:17)  T(F): 97.8 (10 Sep 2021 20:56), Max: 98.5 (10 Sep 2021 13:17)  HR: 63 (10 Sep 2021 20:56) (63 - 68)  BP: 131/76 (10 Sep 2021 20:56) (124/64 - 131/76)  BP(mean): --  ABP: --  ABP(mean): --  RR: 19 (10 Sep 2021 20:56) (17 - 19)  SpO2: 92% (10 Sep 2021 20:56) (86% - 92%)           Input and Output:  I&O's Detail      Lab Data                        14.3   9.82  )-----------( 487      ( 10 Sep 2021 12:51 )             44.0     09-10    139  |  104  |  30<H>  ----------------------------<  196<H>  4.1   |  29  |  0.70    Ca    9.1      10 Sep 2021 12:51    TPro  7.9  /  Alb  2.8<L>  /  TBili  0.4  /  DBili  x   /  AST  141<H>  /  ALT  251<H>  /  AlkPhos  127<H>  09-10            Review of Systems	      Objective     Physical Examination    heart s1s2  lung dec BS  abd soft  head nc  on o2 support      Pertinent Lab findings & Imaging      Dorothy:  NO   Adequate UO     I&O's Detail           Discussed with:     Cultures:	        Radiology

## 2021-09-11 NOTE — PROGRESS NOTE ADULT - ASSESSMENT
Ms Ferrer is a 82 yo F PMH Hypothyroidism, Irritable Bowel Syndrome presenting from home with SOB with a recent COVID-19 exposure [her son] who was diagnosed with COVID here at  but then sent home and his unvaccinated mother cared for him.  She reports   symptom onset 8/30.  She is NOT vaccinated against COVID-19   COVID-19 PCR: Detected (07 Sep 2021 04:49)    RECOMMENDATIONS  9/7- on NC-STEROIDs started, LMWH started  9/8 NC-3L, NLR 6.11/0.87=7, REMDESIVIR started, rec continued steroids and LMWH-continue supportive care, noted asymmetry on CXR so will follow for any indication that abx would be of benefit  9/9 VENTURI, NLR10.23/0.79=13, will increased steroids to 10mg/day, follow for any indication that TOCI indicated, noted CXR, will add abx to cover for potential secondary bacterial process (complicated list of allergies so started meropenem) ordered additional testing  9/10 escalated to NRB, NLR 8.44/0.73=11.6, progressing w increasing oxygen support despite increased steroids, daughter 'Oxana Ferrer' same name [phone].  9/11-pt de-escalated to NC-5L, noted rise in WBC, pt feels improved, continue meropenem, remdesivir, LMWH, steroids    We will follow along in the care of this patient. Please call us at [phone] or text me directly on my cell# at [phone] with any concerns.

## 2021-09-11 NOTE — PROGRESS NOTE ADULT - ASSESSMENT
12yo female who presents with cough, fever for the last 4-5 days. pt  states her son had covid several weeks ago and was cleared on aug 29 and pt has been sick for the last 4-5 days, dry cough, fever tonight of 101 and vomiting no sob, pt denies any other complaints, pt is not vaccinated due to her multiple allergies    legionella ag neg  remains on Vito  ID f/u  remains on o2 support  GI eval noted -   VS noted  fio2 titration in progress    monitor LFTs  DVT p  Remdesivir and Decadron for covid viral pna  CXR noted - LABS reviewed  monitor VS and HD  titrate fio2 support - keep sat > 88 pct  pronate as tolerated  tylenol - robitussin PRN for sx management  isolation precs  supportive medical regimen - nutrition - assist with needs

## 2021-09-11 NOTE — PROGRESS NOTE ADULT - SUBJECTIVE AND OBJECTIVE BOX
Patient is a 81y old  Female who presents with a chief complaint of SOB, Hypoxia, suspected COVID-19 infection (11 Sep 2021 05:42)        HPI:  Ms Ferrer is a 80 yo F presenting from home with SOB with a recent COVID-19 exposure [her son]. PMH Hypothyroidism, Irritable Bowel Syndrome  Patient seen and examined at bedside. She reports having SOB and cough for about 4 days now. Her son was COVID-19 positive recently and was deemed "not contagious" as of August 29. Patient developed SOB and non-productive cough about 5 days ago, she also had a fever of 101 F yesterday. She is not vaccinated against COVID-19 because she has many allergies and feels she will be allergic to vaccine components. Denies chills, light-headedness, dizziness, headaches, nausea, vomiting, chest pain, palpitations, abdominal pain, constipation, diarrhea, melena, hematochezia, dysuria.   Patients COVID-19 PCR is still pending from the ER. CXR shows elevated R hemidiaphragm, R upper perihilar infiltrate [?] [official read pending] (07 Sep 2021 08:38)      SUBJECTIVE & OBJECTIVE: Pt seen and examined at bedside.     PHYSICAL EXAM:  T(C): 36.7 (09-11-21 @ 05:41), Max: 36.9 (09-10-21 @ 13:17)  HR: 76 (09-11-21 @ 05:41) (63 - 76)  BP: 127/59 (09-11-21 @ 05:41) (124/64 - 131/76)  RR: 18 (09-11-21 @ 05:41) (17 - 19)  SpO2: 92% (09-10-21 @ 20:56) (90% - 92%)  Wt(kg): --   GENERAL: NAD,   NECK: Supple, No JVD  NERVOUS SYSTEM:  Alert & Oriented X3,   CHEST/LUNG: Clear to auscultation bilaterally; No rales, rhonchi, wheezing, or rubs  HEART: Regular rate and rhythm; No murmurs, rubs, or gallops  ABDOMEN: Soft, Nontender, Nondistended; Bowel sounds present  EXTREMITIES:  2+ Peripheral Pulses, No clubbing, cyanosis, or edema        MEDICATIONS  (STANDING):  dexAMETHasone     Tablet 10 milliGRAM(s) Oral daily  enoxaparin Injectable 40 milliGRAM(s) SubCutaneous daily  lactulose Syrup 20 Gram(s) Oral three times a day  levothyroxine 100 MICROGram(s) Oral daily  linaclotide 290 MICROGram(s) Oral before breakfast  meropenem  IVPB 1000 milliGRAM(s) IV Intermittent every 8 hours  pantoprazole    Tablet 40 milliGRAM(s) Oral before breakfast  remdesivir  IVPB 100 milliGRAM(s) IV Intermittent every 24 hours  remdesivir  IVPB   IV Intermittent     MEDICATIONS  (PRN):  acetaminophen   Tablet .. 650 milliGRAM(s) Oral every 6 hours PRN Temp greater or equal to 38.5C (101.3F), Mild Pain (1 - 3)  guaiFENesin Oral Liquid (Sugar-Free) 200 milliGRAM(s) Oral every 6 hours PRN Cough  ondansetron Injectable 4 milliGRAM(s) IV Push every 8 hours PRN Nausea and/or Vomiting      LABS:                        13.8   17.73 )-----------( 463      ( 11 Sep 2021 08:39 )             42.6     09-11    141  |  105  |  26<H>  ----------------------------<  90  4.0   |  30  |  0.67    Ca    8.8      11 Sep 2021 08:39    TPro  6.4  /  Alb  2.3<L>  /  TBili  0.5  /  DBili  x   /  AST  210<H>  /  ALT  464<H>  /  AlkPhos  118  09-11    PT/INR - ( 11 Sep 2021 08:39 )   PT: 14.3 sec;   INR: 1.23 ratio               CAPILLARY BLOOD GLUCOSE          CAPILLARY BLOOD GLUCOSE        CAPILLARY BLOOD GLUCOSE                RECENT CULTURES:      RADIOLOGY & ADDITIONAL TESTS:                        DVT/GI ppx  Discussed with pt @ bedside

## 2021-09-11 NOTE — PROGRESS NOTE ADULT - ASSESSMENT
Ms Ferrer is a 80 yo F presenting from home with SOB found to have COVID-19 infection. PMH Hypothyroidism, Irritable Bowel Syndrome    COVID-19 Infection: Sepsis criteria met evolving from admission, with acute respiratory failure with hypoxia  -pt is day 8 from onset of symptoms  -Pt is requiring O2 supplementation as she desaturred to 86% on room air at rest, at 4L she is 90% and will likely require home oxygen for discharge even if pt decides to leave AMA will establish home O2  -apprec psych recs dr segura pt has medical decision making capacity  -cont  remdesmivir and decadron to 10mg daily, if worsens consider toci  -empiric antibx coverage of meropenem as per ID  -apprec ID recs Dr barboza recs  -Dr Cedeno pulm  -CXR shows Right upper lobe infiltrate. Thoracolumbar hardware.  Do not suspect bacterial superinfection at this time.   -remainder as above.      Hypothyroidism:   chronic stable  Continue Levothyroxine.     Irritable Bowel Syndrome: with constipation for past 8 days as per patient  -cont linzess  -citrate mag, lactulose ordered and son to bring xlax   -continue to monitor for bms  -apprec GI recs Dr Farrar     DVT ppx: Lovenox for dvt ppx

## 2021-09-11 NOTE — PROGRESS NOTE ADULT - SUBJECTIVE AND OBJECTIVE BOX
[facility] DIVISION of INFECTIOUS DISEASE  Pradeep Mcmullen MD PhD, Hortensia Grewal MD, Sasha Ness MD, Meagan Patrick MD  and providing coverage with Lilly Galindo MD and Hailey Geller MD  Providing Infectious Disease Consultations at [facility]'s      Office# [phone] to schedule follow up appointments  Answering Service for urgent calls or New Consults [phone]  Cell# to text for urgent issues Pradeep Mcmullen [phone]     Infectious diseases progress note:    MICHEL TRENT is a 81y y. o. Female patient    COVID Patient    Allergies    Advil (Unknown)  allergic to food coloring (Unknown)  Ceclor (Other)  cephalosporins (Other)  ciprofloxacin (Other)  Compazine (Other)  food coloring red/blue, wheat, tide, morphine, ceclor, compazine, advil, mortim, iv contrast, shellfish, oregano, tomoatoes, pork, peas, mold/spores (Unknown)  morphine (Unknown)  Motrin (Unknown)  Originally Entered as [Rash] reaction to [oregano spice] (Unknown)  peanuts (Other)  pork (Unknown)  shellfish (Unknown)    Intolerances        ANTIBIOTICS/RELEVANT:  antimicrobials  meropenem  IVPB 1000 milliGRAM(s) IV Intermittent every 8 hours  remdesivir  IVPB 100 milliGRAM(s) IV Intermittent every 24 hours  remdesivir  IVPB   IV Intermittent     immunologic:    OTHER:  acetaminophen   Tablet .. 650 milliGRAM(s) Oral every 6 hours PRN  dexAMETHasone     Tablet 10 milliGRAM(s) Oral daily  enoxaparin Injectable 40 milliGRAM(s) SubCutaneous daily  guaiFENesin Oral Liquid (Sugar-Free) 200 milliGRAM(s) Oral every 6 hours PRN  lactulose Syrup 20 Gram(s) Oral three times a day  levothyroxine 100 MICROGram(s) Oral daily  linaclotide 290 MICROGram(s) Oral before breakfast  ondansetron Injectable 4 milliGRAM(s) IV Push every 8 hours PRN  pantoprazole    Tablet 40 milliGRAM(s) Oral before breakfast      Objective:  Vital Signs Last 24 Hrs  T(C): 36.7 (11 Sep 2021 05:41), Max: 36.9 (10 Sep 2021 13:17)  T(F): 98.1 (11 Sep 2021 05:41), Max: 98.5 (10 Sep 2021 13:17)  HR: 76 (11 Sep 2021 05:41) (63 - 76)  BP: 127/59 (11 Sep 2021 05:41) (124/64 - 131/76)  BP(mean): --  RR: 18 (11 Sep 2021 05:41) (17 - 19)  SpO2: 92% (10 Sep 2021 20:56) (90% - 92%)    T(C): 36.7 (09-11-21 @ 05:41), Max: 36.9 (09-10-21 @ 13:17)  T(C): 36.7 (09-11-21 @ 05:41), Max: 37.3 (09-08-21 @ 13:47)  T(C): 36.7 (09-11-21 @ 05:41), Max: 39.3 (09-07-21 @ 21:17)    PHYSICAL EXAM:  HEENT: NC atraumatic  Neck: supple  Respiratory: no accessory muscle use, breathing comfortably  Cardiovascular: distant  Gastrointestinal: normal appearing, nondistended  Extremities: no clubbing, no cyanosis,        LABS:                          13.8   17.73 )-----------( 463      ( 11 Sep 2021 08:39 )             42.6       17.73 09-11 @ 08:39  9.82 09-10 @ 12:51  9.50 09-10 @ 09:07  11.53 09-09 @ 08:22  7.45 09-08 @ 11:20  10.76 09-07 @ 05:19      09-11    141  |  105  |  26<H>  ----------------------------<  90  4.0   |  30  |  0.67    Ca    8.8      11 Sep 2021 08:39    TPro  6.4  /  Alb  2.3<L>  /  TBili  0.5  /  DBili  x   /  AST  210<H>  /  ALT  464<H>  /  AlkPhos  118  09-11      Creatinine, Serum: 0.67 mg/dL (09-11-21 @ 08:39)  Creatinine, Serum: 0.70 mg/dL (09-10-21 @ 12:51)  Creatinine, Serum: 0.78 mg/dL (09-10-21 @ 09:07)  Creatinine, Serum: 0.81 mg/dL (09-09-21 @ 18:36)  Creatinine, Serum: 0.93 mg/dL (09-08-21 @ 13:08)  Creatinine, Serum: 0.82 mg/dL (09-07-21 @ 05:19)      PT/INR - ( 11 Sep 2021 08:39 )   PT: 14.3 sec;   INR: 1.23 ratio                   COVID RISK SCORE  Auto Neutrophil #: 15.51 K/uL (09-11-21 @ 08:39)  Auto Lymphocyte #: 0.97 K/uL (09-11-21 @ 08:39)  Auto Neutrophil #: 8.67 K/uL (09-10-21 @ 12:51)  Auto Lymphocyte #: 0.70 K/uL (09-10-21 @ 12:51)  Auto Neutrophil #: 8.44 K/uL (09-10-21 @ 09:07)  Auto Lymphocyte #: 0.73 K/uL (09-10-21 @ 09:07)  Auto Neutrophil #: 10.23 K/uL (09-09-21 @ 08:22)  Auto Lymphocyte #: 0.79 K/uL (09-09-21 @ 08:22)  Auto Neutrophil #: 6.11 K/uL (09-08-21 @ 11:20)  Auto Lymphocyte #: 0.87 K/uL (09-08-21 @ 11:20)  Auto Neutrophil #: 9.38 K/uL (09-07-21 @ 05:19)  Auto Lymphocyte #: 0.93 K/uL (09-07-21 @ 05:19)    Lactate, Blood: 1.1 mmol/L (09-07-21 @ 05:19)    Auto Eosinophil #: 0.01 K/uL (09-11-21 @ 08:39)  Auto Eosinophil #: 0.00 K/uL (09-10-21 @ 12:51)  Auto Eosinophil #: 0.00 K/uL (09-10-21 @ 09:07)  Auto Eosinophil #: 0.00 K/uL (09-09-21 @ 08:22)  Auto Eosinophil #: 0.01 K/uL (09-08-21 @ 11:20)  Auto Eosinophil #: 0.01 K/uL (09-07-21 @ 05:19)        Procalcitonin, Serum: 0.98 ng/mL (09-09-21 @ 08:22)            Ferritin, Serum: 812 ng/mL (09-09-21 @ 12:20)        INR: 1.23 ratio (09-11-21 @ 08:39)  INR: 1.27 ratio (09-10-21 @ 09:07)  INR: 1.28 ratio (09-09-21 @ 08:22)  INR: 1.40 ratio (09-08-21 @ 17:29)  Activated Partial Thromboplastin Time: 40.6 sec (09-08-21 @ 17:29)  Activated Partial Thromboplastin Time: 34.1 sec (09-07-21 @ 05:19)  INR: 1.13 ratio (09-07-21 @ 05:19)    D-Dimer Assay, Quantitative: 246 ng/mL DDU (09-10-21 @ 09:07)  D-Dimer Assay, Quantitative: 231 ng/mL DDU (09-09-21 @ 08:22)        MICROBIOLOGY:              RADIOLOGY & ADDITIONAL STUDIES:

## 2021-09-12 LAB
ALBUMIN SERPL ELPH-MCNC: 2.2 G/DL — LOW (ref 3.3–5)
ALP SERPL-CCNC: 111 U/L — SIGNIFICANT CHANGE UP (ref 40–120)
ALT FLD-CCNC: 304 U/L — HIGH (ref 12–78)
ANION GAP SERPL CALC-SCNC: 6 MMOL/L — SIGNIFICANT CHANGE UP (ref 5–17)
AST SERPL-CCNC: 79 U/L — HIGH (ref 15–37)
BILIRUB SERPL-MCNC: 0.7 MG/DL — SIGNIFICANT CHANGE UP (ref 0.2–1.2)
BUN SERPL-MCNC: 22 MG/DL — SIGNIFICANT CHANGE UP (ref 7–23)
CALCIUM SERPL-MCNC: 8.2 MG/DL — LOW (ref 8.5–10.1)
CHLORIDE SERPL-SCNC: 102 MMOL/L — SIGNIFICANT CHANGE UP (ref 96–108)
CO2 SERPL-SCNC: 30 MMOL/L — SIGNIFICANT CHANGE UP (ref 22–31)
CREAT SERPL-MCNC: 0.49 MG/DL — LOW (ref 0.5–1.3)
CULTURE RESULTS: SIGNIFICANT CHANGE UP
CULTURE RESULTS: SIGNIFICANT CHANGE UP
GLUCOSE SERPL-MCNC: 106 MG/DL — HIGH (ref 70–99)
HCT VFR BLD CALC: 38.2 % — SIGNIFICANT CHANGE UP (ref 34.5–45)
HGB BLD-MCNC: 12.7 G/DL — SIGNIFICANT CHANGE UP (ref 11.5–15.5)
INR BLD: 1.23 RATIO — HIGH (ref 0.88–1.16)
MCHC RBC-ENTMCNC: 30.5 PG — SIGNIFICANT CHANGE UP (ref 27–34)
MCHC RBC-ENTMCNC: 33.2 GM/DL — SIGNIFICANT CHANGE UP (ref 32–36)
MCV RBC AUTO: 91.8 FL — SIGNIFICANT CHANGE UP (ref 80–100)
NRBC # BLD: 0 /100 WBCS — SIGNIFICANT CHANGE UP (ref 0–0)
PLATELET # BLD AUTO: 424 K/UL — HIGH (ref 150–400)
POTASSIUM SERPL-MCNC: 4.6 MMOL/L — SIGNIFICANT CHANGE UP (ref 3.5–5.3)
POTASSIUM SERPL-SCNC: 4.6 MMOL/L — SIGNIFICANT CHANGE UP (ref 3.5–5.3)
PROT SERPL-MCNC: 5.9 G/DL — LOW (ref 6–8.3)
PROTHROM AB SERPL-ACNC: 14.3 SEC — HIGH (ref 10.6–13.6)
RBC # BLD: 4.16 M/UL — SIGNIFICANT CHANGE UP (ref 3.8–5.2)
RBC # FLD: 12.9 % — SIGNIFICANT CHANGE UP (ref 10.3–14.5)
SODIUM SERPL-SCNC: 138 MMOL/L — SIGNIFICANT CHANGE UP (ref 135–145)
SPECIMEN SOURCE: SIGNIFICANT CHANGE UP
SPECIMEN SOURCE: SIGNIFICANT CHANGE UP
WBC # BLD: 16.09 K/UL — HIGH (ref 3.8–10.5)
WBC # FLD AUTO: 16.09 K/UL — HIGH (ref 3.8–10.5)

## 2021-09-12 PROCEDURE — 99232 SBSQ HOSP IP/OBS MODERATE 35: CPT | Mod: GC

## 2021-09-12 RX ADMIN — ENOXAPARIN SODIUM 40 MILLIGRAM(S): 100 INJECTION SUBCUTANEOUS at 11:19

## 2021-09-12 RX ADMIN — LACTULOSE 20 GRAM(S): 10 SOLUTION ORAL at 05:52

## 2021-09-12 RX ADMIN — MEROPENEM 100 MILLIGRAM(S): 1 INJECTION INTRAVENOUS at 22:14

## 2021-09-12 RX ADMIN — PANTOPRAZOLE SODIUM 40 MILLIGRAM(S): 20 TABLET, DELAYED RELEASE ORAL at 05:52

## 2021-09-12 RX ADMIN — LACTULOSE 20 GRAM(S): 10 SOLUTION ORAL at 22:14

## 2021-09-12 RX ADMIN — Medication 10 MILLIGRAM(S): at 05:52

## 2021-09-12 RX ADMIN — Medication 200 MILLIGRAM(S): at 08:14

## 2021-09-12 RX ADMIN — Medication 100 MICROGRAM(S): at 05:52

## 2021-09-12 RX ADMIN — LACTULOSE 20 GRAM(S): 10 SOLUTION ORAL at 14:33

## 2021-09-12 RX ADMIN — MEROPENEM 100 MILLIGRAM(S): 1 INJECTION INTRAVENOUS at 05:52

## 2021-09-12 RX ADMIN — MEROPENEM 100 MILLIGRAM(S): 1 INJECTION INTRAVENOUS at 14:33

## 2021-09-12 RX ADMIN — REMDESIVIR 500 MILLIGRAM(S): 5 INJECTION INTRAVENOUS at 10:08

## 2021-09-12 RX ADMIN — LINACLOTIDE 290 MICROGRAM(S): 145 CAPSULE, GELATIN COATED ORAL at 08:12

## 2021-09-12 RX ADMIN — Medication 1 ENEMA: at 14:33

## 2021-09-12 NOTE — PROGRESS NOTE ADULT - ASSESSMENT
80yo female who presents with cough, fever for the last 4-5 days. pt  states her son had covid several weeks ago and was cleared on aug 29 and pt has been sick for the last 4-5 days, dry cough, fever tonight of 101 and vomiting no sob, pt denies any other complaints, pt is not vaccinated due to her multiple allergies    legionella ag neg  remains on Vito  ID f/u  remains on o2 support  GI eval noted -   VS noted  fio2 titration in progress    monitor LFTs  DVT p  Remdesivir and Decadron for covid viral pna  CXR noted - LABS reviewed  monitor VS and HD  titrate fio2 support - keep sat > 88 pct  pronate as tolerated  tylenol - robitussin PRN for sx management  isolation precs  supportive medical regimen - nutrition - assist with needs

## 2021-09-12 NOTE — PROGRESS NOTE ADULT - SUBJECTIVE AND OBJECTIVE BOX
Patient is a 81y old  Female who presents with a chief complaint of SOB, Hypoxia, suspected COVID-19 infection (12 Sep 2021 05:42)        HPI:  Ms Ferrer is a 80 yo F presenting from home with SOB with a recent COVID-19 exposure [her son]. PMH Hypothyroidism, Irritable Bowel Syndrome  Patient seen and examined at bedside. She reports having SOB and cough for about 4 days now. Her son was COVID-19 positive recently and was deemed "not contagious" as of August 29. Patient developed SOB and non-productive cough about 5 days ago, she also had a fever of 101 F yesterday. She is not vaccinated against COVID-19 because she has many allergies and feels she will be allergic to vaccine components. Denies chills, light-headedness, dizziness, headaches, nausea, vomiting, chest pain, palpitations, abdominal pain, constipation, diarrhea, melena, hematochezia, dysuria.   Patients COVID-19 PCR is still pending from the ER. CXR shows elevated R hemidiaphragm, R upper perihilar infiltrate [?] [official read pending] (07 Sep 2021 08:38)      SUBJECTIVE & OBJECTIVE: Pt seen and examined at bedside.complaining of constipation     PHYSICAL EXAM:  T(C): 36.3 (09-12-21 @ 12:59), Max: 36.8 (09-12-21 @ 04:50)  HR: 66 (09-12-21 @ 12:59) (66 - 68)  BP: 119/65 (09-12-21 @ 12:59) (119/65 - 144/75)  RR: 20 (09-12-21 @ 12:59) (17 - 20)  SpO2: 90% (09-12-21 @ 12:59) (90% - 91%)  Wt(kg): --   GENERAL: NAD, well-groomed, well-developedes  NECK: Supple, No JVD  NERVOUS SYSTEM:  Alert & Oriented X3,   CHEST/LUNG: Clear to auscultation bilaterally; No rales, rhonchi, wheezing, or rubs  HEART: Regular rate and rhythm; No murmurs, rubs, or gallops  ABDOMEN: Soft, Nontender, Nondistended; Bowel sounds present  EXTREMITIES:  2+ Peripheral Pulses, No clubbing, cyanosis, or edema        MEDICATIONS  (STANDING):  dexAMETHasone     Tablet 10 milliGRAM(s) Oral daily  enoxaparin Injectable 40 milliGRAM(s) SubCutaneous daily  lactulose Syrup 20 Gram(s) Oral three times a day  levothyroxine 100 MICROGram(s) Oral daily  linaclotide 290 MICROGram(s) Oral before breakfast  meropenem  IVPB 1000 milliGRAM(s) IV Intermittent every 8 hours  pantoprazole    Tablet 40 milliGRAM(s) Oral before breakfast  saline laxative (FLEET) Rectal Enema 1 Enema Rectal once    MEDICATIONS  (PRN):  acetaminophen   Tablet .. 650 milliGRAM(s) Oral every 6 hours PRN Temp greater or equal to 38.5C (101.3F), Mild Pain (1 - 3)  guaiFENesin Oral Liquid (Sugar-Free) 200 milliGRAM(s) Oral every 6 hours PRN Cough  ondansetron Injectable 4 milliGRAM(s) IV Push every 8 hours PRN Nausea and/or Vomiting      LABS:                        12.7   16.09 )-----------( 424      ( 12 Sep 2021 09:21 )             38.2     09-12    138  |  102  |  22  ----------------------------<  106<H>  4.6   |  30  |  0.49<L>    Ca    8.2<L>      12 Sep 2021 09:21    TPro  5.9<L>  /  Alb  2.2<L>  /  TBili  0.7  /  DBili  x   /  AST  79<H>  /  ALT  304<H>  /  AlkPhos  111  09-12    PT/INR - ( 12 Sep 2021 09:21 )   PT: 14.3 sec;   INR: 1.23 ratio               CAPILLARY BLOOD GLUCOSE          CAPILLARY BLOOD GLUCOSE        CAPILLARY BLOOD GLUCOSE                RECENT CULTURES:      RADIOLOGY & ADDITIONAL TESTS:                        DVT/GI ppx  Discussed with pt @ bedside

## 2021-09-12 NOTE — PROGRESS NOTE ADULT - ASSESSMENT
Ms Ferrer is a 82 yo F PMH Hypothyroidism, Irritable Bowel Syndrome presenting from home with SOB with a recent COVID-19 exposure [her son] who was diagnosed with COVID here at  but then sent home and his unvaccinated mother cared for him.  She reports   symptom onset 8/30.  She is NOT vaccinated against COVID-19   COVID-19 PCR: Detected (07 Sep 2021 04:49)    RECOMMENDATIONS  9/7- on NC-STEROIDs started, LMWH started  9/8 NC-3L, NLR 6.11/0.87=7, REMDESIVIR started, rec continued steroids and LMWH-continue supportive care, noted asymmetry on CXR so will follow for any indication that abx would be of benefit  9/9 VENTURI, NLR10.23/0.79=13, will increased steroids to 10mg/day, follow for any indication that TOCI indicated, noted CXR, will add abx to cover for potential secondary bacterial process (complicated list of allergies so started meropenem) ordered additional testing  9/10 escalated to NRB, NLR 8.44/0.73=11.6, progressing w increasing oxygen support despite increased steroids, daughter 'Oxana Ferrer' same name [phone].  9/11-pt de-escalated to NC-5L, noted rise in WBC, pt feels improved, continue meropenem, remdesivir, LMWH, steroids  9/12 NC-4L, pt reports she feels much better day 4 of meropenem so look at Monday as last day and can start looking at dispo plans     We will follow along in the care of this patient. Please call us at [phone] or text me directly on my cell# at [phone] with any concerns.

## 2021-09-12 NOTE — PROGRESS NOTE ADULT - ASSESSMENT
Ms Ferrer is a 82 yo F presenting from home with SOB found to have COVID-19 infection. PMH Hypothyroidism, Irritable Bowel Syndrome    COVID-19 Infection: Sepsis criteria met evolving from admission, with acute respiratory failure with hypoxia  -pt is day 8 from onset of symptoms  -Pt is requiring O2 supplementation as she desaturred to 86% on room air at rest, at 4L she is 90% and will likely require home oxygen for discharge even if pt decides to leave AMA will establish home O2  -apprec psych recs dr segura pt has medical decision making capacity  -cont  remdesmivir and decadron to 10mg daily, if worsens consider toci  -empiric antibx coverage of meropenem as per ID  -apprec ID recs Dr barboza recs  -Dr Cedeno pulm  -CXR shows Right upper lobe infiltrate. Thoracolumbar hardware.  Do not suspect bacterial superinfection at this time.   -remainder as above.    constipation  as per pt no bowel movement for about a week  will order enema       Hypothyroidism:   chronic stable  Continue Levothyroxine.     Irritable Bowel Syndrome: with constipation for past 8 days as per patient  -cont linzess  -citrate mag, lactulose ordered and son to bring xlax   -continue to monitor for bms  -apprec GI recs Dr Farrar     DVT ppx: Lovenox for dvt ppx

## 2021-09-12 NOTE — PROGRESS NOTE ADULT - SUBJECTIVE AND OBJECTIVE BOX
[facility] DIVISION of INFECTIOUS DISEASE  Pradeep Mcmullen MD PhD, Hortensia Grewal MD, Sasha Ness MD, Meagan Patrick MD  and providing coverage with Lilly Galindo MD and Hailey Geller MD  Providing Infectious Disease Consultations at [facility]    Office# [phone] to schedule follow up appointments  Answering Service for urgent calls or New Consults [phone]  Cell# to text for urgent issues Pradeep Mcmullen [phone]     infectious diseases progress note:    MICHEL TRENT is a 81y y. o. Female patient    No concerning overnight events    Allergies    Advil (Unknown)  allergic to food coloring (Unknown)  Ceclor (Other)  cephalosporins (Other)  ciprofloxacin (Other)  Compazine (Other)  food coloring red/blue, wheat, tide, morphine, ceclor, compazine, advil, mortim, iv contrast, shellfish, oregano, tomoatoes, pork, peas, mold/spores (Unknown)  morphine (Unknown)  Motrin (Unknown)  Originally Entered as [Rash] reaction to [oregano spice] (Unknown)  peanuts (Other)  pork (Unknown)  shellfish (Unknown)    Intolerances        ANTIBIOTICS/RELEVANT:  antimicrobials  meropenem  IVPB 1000 milliGRAM(s) IV Intermittent every 8 hours    immunologic:    OTHER:  acetaminophen   Tablet .. 650 milliGRAM(s) Oral every 6 hours PRN  dexAMETHasone     Tablet 10 milliGRAM(s) Oral daily  enoxaparin Injectable 40 milliGRAM(s) SubCutaneous daily  guaiFENesin Oral Liquid (Sugar-Free) 200 milliGRAM(s) Oral every 6 hours PRN  lactulose Syrup 20 Gram(s) Oral three times a day  levothyroxine 100 MICROGram(s) Oral daily  linaclotide 290 MICROGram(s) Oral before breakfast  ondansetron Injectable 4 milliGRAM(s) IV Push every 8 hours PRN  pantoprazole    Tablet 40 milliGRAM(s) Oral before breakfast  saline laxative (FLEET) Rectal Enema 1 Enema Rectal once      Objective:  Vital Signs Last 24 Hrs  T(C): 36.3 (12 Sep 2021 12:59), Max: 36.8 (12 Sep 2021 04:50)  T(F): 97.3 (12 Sep 2021 12:59), Max: 98.3 (12 Sep 2021 04:50)  HR: 66 (12 Sep 2021 12:59) (66 - 68)  BP: 119/65 (12 Sep 2021 12:59) (119/65 - 144/75)  BP(mean): --  RR: 20 (12 Sep 2021 12:59) (17 - 20)  SpO2: 90% (12 Sep 2021 12:59) (90% - 91%)    T(C): 36.3 (09-12-21 @ 12:59), Max: 36.8 (09-12-21 @ 04:50)  T(C): 36.3 (09-12-21 @ 12:59), Max: 36.9 (09-10-21 @ 13:17)  T(C): 36.3 (09-12-21 @ 12:59), Max: 36.9 (09-09-21 @ 04:44)    PHYSICAL EXAM:  HEENT: NC atraumatic  Neck: supple  Respiratory: no accessory muscle use, breathing comfortably  Cardiovascular: distant  Gastrointestinal: normal appearing, nondistended  Extremities: no clubbing, no cyanosis,        LABS:                          12.7   16.09 )-----------( 424      ( 12 Sep 2021 09:21 )             38.2       16.09 09-12 @ 09:21  17.73 09-11 @ 08:39  9.82 09-10 @ 12:51  9.50 09-10 @ 09:07  11.53 09-09 @ 08:22  7.45 09-08 @ 11:20  10.76 09-07 @ 05:19      09-12    138  |  102  |  22  ----------------------------<  106<H>  4.6   |  30  |  0.49<L>    Ca    8.2<L>      12 Sep 2021 09:21    TPro  5.9<L>  /  Alb  2.2<L>  /  TBili  0.7  /  DBili  x   /  AST  79<H>  /  ALT  304<H>  /  AlkPhos  111  09-12      Creatinine, Serum: 0.49 mg/dL (09-12-21 @ 09:21)  Creatinine, Serum: 0.67 mg/dL (09-11-21 @ 08:39)  Creatinine, Serum: 0.70 mg/dL (09-10-21 @ 12:51)  Creatinine, Serum: 0.78 mg/dL (09-10-21 @ 09:07)  Creatinine, Serum: 0.81 mg/dL (09-09-21 @ 18:36)  Creatinine, Serum: 0.93 mg/dL (09-08-21 @ 13:08)  Creatinine, Serum: 0.82 mg/dL (09-07-21 @ 05:19)      PT/INR - ( 12 Sep 2021 09:21 )   PT: 14.3 sec;   INR: 1.23 ratio                   INFLAMMATORY MARKERS  Auto Neutrophil #: 15.51 K/uL (09-11-21 @ 08:39)  Auto Lymphocyte #: 0.97 K/uL (09-11-21 @ 08:39)  Auto Neutrophil #: 8.67 K/uL (09-10-21 @ 12:51)  Auto Lymphocyte #: 0.70 K/uL (09-10-21 @ 12:51)  Auto Neutrophil #: 8.44 K/uL (09-10-21 @ 09:07)  Auto Lymphocyte #: 0.73 K/uL (09-10-21 @ 09:07)  Auto Neutrophil #: 10.23 K/uL (09-09-21 @ 08:22)  Auto Lymphocyte #: 0.79 K/uL (09-09-21 @ 08:22)  Auto Neutrophil #: 6.11 K/uL (09-08-21 @ 11:20)  Auto Lymphocyte #: 0.87 K/uL (09-08-21 @ 11:20)  Auto Neutrophil #: 9.38 K/uL (09-07-21 @ 05:19)  Auto Lymphocyte #: 0.93 K/uL (09-07-21 @ 05:19)    Lactate, Blood: 1.1 mmol/L (09-07-21 @ 05:19)    Auto Eosinophil #: 0.01 K/uL (09-11-21 @ 08:39)  Auto Eosinophil #: 0.00 K/uL (09-10-21 @ 12:51)  Auto Eosinophil #: 0.00 K/uL (09-10-21 @ 09:07)  Auto Eosinophil #: 0.00 K/uL (09-09-21 @ 08:22)  Auto Eosinophil #: 0.01 K/uL (09-08-21 @ 11:20)  Auto Eosinophil #: 0.01 K/uL (09-07-21 @ 05:19)        Procalcitonin, Serum: 0.98 ng/mL (09-09-21 @ 08:22)            Ferritin, Serum: 812 ng/mL (09-09-21 @ 12:20)        INR: 1.23 ratio (09-12-21 @ 09:21)  INR: 1.23 ratio (09-11-21 @ 08:39)  INR: 1.27 ratio (09-10-21 @ 09:07)  INR: 1.28 ratio (09-09-21 @ 08:22)  INR: 1.40 ratio (09-08-21 @ 17:29)  Activated Partial Thromboplastin Time: 40.6 sec (09-08-21 @ 17:29)  Activated Partial Thromboplastin Time: 34.1 sec (09-07-21 @ 05:19)  INR: 1.13 ratio (09-07-21 @ 05:19)    D-Dimer Assay, Quantitative: 246 ng/mL DDU (09-10-21 @ 09:07)  D-Dimer Assay, Quantitative: 231 ng/mL DDU (09-09-21 @ 08:22)        MICROBIOLOGY:              RADIOLOGY & ADDITIONAL STUDIES:

## 2021-09-12 NOTE — PROGRESS NOTE ADULT - SUBJECTIVE AND OBJECTIVE BOX
Date/Time Patient Seen:  		  Referring MD:   Data Reviewed	       Patient is a 81y old  Female who presents with a chief complaint of SOB, Hypoxia, suspected COVID-19 infection (11 Sep 2021 12:17)      Subjective/HPI     PAST MEDICAL & SURGICAL HISTORY:  Chronic Back Pain    Hypothyroidism    Edema    Cataract    Spastic colon    Lumbar back pain    Constipation    Osteoarthritis    MRSA cellulitis    MRSA colonization    Diabetes    S/P Hysterectomy  1987    S/P Cholecystectomy  1968    S/P Appendectomy  1968    S/P TKR (Total Knee Replacement)  Right 2015    S/P TKR (total knee replacement)  Left 2009    H/O Spinal surgery  2009    H/O sinus surgery  2/2017    Brundidge filter in place  2009          Medication list         MEDICATIONS  (STANDING):  dexAMETHasone     Tablet 10 milliGRAM(s) Oral daily  enoxaparin Injectable 40 milliGRAM(s) SubCutaneous daily  lactulose Syrup 20 Gram(s) Oral three times a day  levothyroxine 100 MICROGram(s) Oral daily  linaclotide 290 MICROGram(s) Oral before breakfast  meropenem  IVPB 1000 milliGRAM(s) IV Intermittent every 8 hours  pantoprazole    Tablet 40 milliGRAM(s) Oral before breakfast  remdesivir  IVPB 100 milliGRAM(s) IV Intermittent every 24 hours  remdesivir  IVPB   IV Intermittent     MEDICATIONS  (PRN):  acetaminophen   Tablet .. 650 milliGRAM(s) Oral every 6 hours PRN Temp greater or equal to 38.5C (101.3F), Mild Pain (1 - 3)  guaiFENesin Oral Liquid (Sugar-Free) 200 milliGRAM(s) Oral every 6 hours PRN Cough  ondansetron Injectable 4 milliGRAM(s) IV Push every 8 hours PRN Nausea and/or Vomiting         Vitals log        ICU Vital Signs Last 24 Hrs  T(C): 36.8 (12 Sep 2021 04:50), Max: 36.8 (12 Sep 2021 04:50)  T(F): 98.3 (12 Sep 2021 04:50), Max: 98.3 (12 Sep 2021 04:50)  HR: 68 (12 Sep 2021 04:50) (67 - 68)  BP: 124/79 (12 Sep 2021 04:50) (124/79 - 144/75)  BP(mean): --  ABP: --  ABP(mean): --  RR: 17 (12 Sep 2021 04:50) (17 - 18)  SpO2: 91% (12 Sep 2021 04:50) (90% - 91%)           Input and Output:  I&O's Detail    11 Sep 2021 07:01  -  12 Sep 2021 05:42  --------------------------------------------------------  IN:    IV PiggyBack: 300 mL  Total IN: 300 mL    OUT:  Total OUT: 0 mL    Total NET: 300 mL          Lab Data                        13.8   17.73 )-----------( 463      ( 11 Sep 2021 08:39 )             42.6     09-11    141  |  105  |  26<H>  ----------------------------<  90  4.0   |  30  |  0.67    Ca    8.8      11 Sep 2021 08:39    TPro  6.4  /  Alb  2.3<L>  /  TBili  0.5  /  DBili  x   /  AST  210<H>  /  ALT  464<H>  /  AlkPhos  118  09-11            Review of Systems	      Objective     Physical Examination    heart s1s2  lung dec BS  abd soft      Pertinent Lab findings & Imaging      Dorothy:  NO   Adequate UO     I&O's Detail    11 Sep 2021 07:01  -  12 Sep 2021 05:42  --------------------------------------------------------  IN:    IV PiggyBack: 300 mL  Total IN: 300 mL    OUT:  Total OUT: 0 mL    Total NET: 300 mL               Discussed with:     Cultures:	        Radiology

## 2021-09-13 ENCOUNTER — TRANSCRIPTION ENCOUNTER (OUTPATIENT)
Age: 81
End: 2021-09-13

## 2021-09-13 LAB
ALBUMIN SERPL ELPH-MCNC: 2.2 G/DL — LOW (ref 3.3–5)
ALP SERPL-CCNC: 114 U/L — SIGNIFICANT CHANGE UP (ref 40–120)
ALT FLD-CCNC: 230 U/L — HIGH (ref 12–78)
ANION GAP SERPL CALC-SCNC: 6 MMOL/L — SIGNIFICANT CHANGE UP (ref 5–17)
AST SERPL-CCNC: 36 U/L — SIGNIFICANT CHANGE UP (ref 15–37)
BILIRUB SERPL-MCNC: 0.7 MG/DL — SIGNIFICANT CHANGE UP (ref 0.2–1.2)
BUN SERPL-MCNC: 21 MG/DL — SIGNIFICANT CHANGE UP (ref 7–23)
CALCIUM SERPL-MCNC: 8.5 MG/DL — SIGNIFICANT CHANGE UP (ref 8.5–10.1)
CHLORIDE SERPL-SCNC: 102 MMOL/L — SIGNIFICANT CHANGE UP (ref 96–108)
CO2 SERPL-SCNC: 29 MMOL/L — SIGNIFICANT CHANGE UP (ref 22–31)
CREAT SERPL-MCNC: 0.55 MG/DL — SIGNIFICANT CHANGE UP (ref 0.5–1.3)
GLUCOSE SERPL-MCNC: 133 MG/DL — HIGH (ref 70–99)
HCT VFR BLD CALC: 41.9 % — SIGNIFICANT CHANGE UP (ref 34.5–45)
HGB BLD-MCNC: 13.5 G/DL — SIGNIFICANT CHANGE UP (ref 11.5–15.5)
INR BLD: 1.18 RATIO — HIGH (ref 0.88–1.16)
MCHC RBC-ENTMCNC: 30 PG — SIGNIFICANT CHANGE UP (ref 27–34)
MCHC RBC-ENTMCNC: 32.2 GM/DL — SIGNIFICANT CHANGE UP (ref 32–36)
MCV RBC AUTO: 93.1 FL — SIGNIFICANT CHANGE UP (ref 80–100)
NRBC # BLD: 0 /100 WBCS — SIGNIFICANT CHANGE UP (ref 0–0)
PLATELET # BLD AUTO: 418 K/UL — HIGH (ref 150–400)
POTASSIUM SERPL-MCNC: 4.6 MMOL/L — SIGNIFICANT CHANGE UP (ref 3.5–5.3)
POTASSIUM SERPL-SCNC: 4.6 MMOL/L — SIGNIFICANT CHANGE UP (ref 3.5–5.3)
PROT SERPL-MCNC: 6.2 G/DL — SIGNIFICANT CHANGE UP (ref 6–8.3)
PROTHROM AB SERPL-ACNC: 13.7 SEC — HIGH (ref 10.6–13.6)
RBC # BLD: 4.5 M/UL — SIGNIFICANT CHANGE UP (ref 3.8–5.2)
RBC # FLD: 12.9 % — SIGNIFICANT CHANGE UP (ref 10.3–14.5)
SODIUM SERPL-SCNC: 137 MMOL/L — SIGNIFICANT CHANGE UP (ref 135–145)
WBC # BLD: 15.39 K/UL — HIGH (ref 3.8–10.5)
WBC # FLD AUTO: 15.39 K/UL — HIGH (ref 3.8–10.5)

## 2021-09-13 PROCEDURE — 99232 SBSQ HOSP IP/OBS MODERATE 35: CPT | Mod: GC

## 2021-09-13 RX ADMIN — Medication 10 MILLIGRAM(S): at 05:50

## 2021-09-13 RX ADMIN — LINACLOTIDE 290 MICROGRAM(S): 145 CAPSULE, GELATIN COATED ORAL at 11:44

## 2021-09-13 RX ADMIN — MEROPENEM 100 MILLIGRAM(S): 1 INJECTION INTRAVENOUS at 22:03

## 2021-09-13 RX ADMIN — ENOXAPARIN SODIUM 40 MILLIGRAM(S): 100 INJECTION SUBCUTANEOUS at 11:44

## 2021-09-13 RX ADMIN — MEROPENEM 100 MILLIGRAM(S): 1 INJECTION INTRAVENOUS at 15:10

## 2021-09-13 RX ADMIN — PANTOPRAZOLE SODIUM 40 MILLIGRAM(S): 20 TABLET, DELAYED RELEASE ORAL at 05:50

## 2021-09-13 RX ADMIN — Medication 100 MICROGRAM(S): at 05:50

## 2021-09-13 RX ADMIN — MEROPENEM 100 MILLIGRAM(S): 1 INJECTION INTRAVENOUS at 05:50

## 2021-09-13 NOTE — DISCHARGE NOTE PROVIDER - NSDCCPCAREPLAN_GEN_ALL_CORE_FT
PRINCIPAL DISCHARGE DIAGNOSIS  Diagnosis: 2019 novel coronavirus disease (COVID-19)  Assessment and Plan of Treatment: You have tested POSITIVE for the novel coronavirus (COVID-19). Upon discharge, you must self-quarantine for 14 days, or until the Department of Health contacts you. Please wear a face mask if you are around other individuals. Try to avoid contact with house members, family, and friends for the duration of this quarantine. Please follow up with your primary care physician within 2-3 weeks of your discharge from the hospital. Please take all medications as prescribed. If you experience any worsening or recurrence of your symptoms, particularly worsening or high fever, shortness of breathe, extreme fatigue, or bloody cough please call 9-1-1 immediately or report to the nearest Emergency Department. If you have any questions or concerns, please do not hesitate to call the hospital.  Use nasal canula oxygen as needed        SECONDARY DISCHARGE DIAGNOSES  Diagnosis: Hypoxemia requiring supplemental oxygen  Assessment and Plan of Treatment: Start Nasal canula oxygen and follow up closely with your outpatient provider     PRINCIPAL DISCHARGE DIAGNOSIS  Diagnosis: 2019 novel coronavirus disease (COVID-19)  Assessment and Plan of Treatment: You have tested POSITIVE for the novel coronavirus (COVID-19). Upon discharge, you must self-quarantine for 14 days, or until the Department of Health contacts you. Please wear a face mask if you are around other individuals. Try to avoid contact with house members, family, and friends for the duration of this quarantine. Please take all medications as prescribed. If you experience any worsening or recurrence of your symptoms, particularly worsening or high fever, shortness of breathe, extreme fatigue, or bloody cough please call 9-1-1 immediately or report to the nearest Emergency Department. If you have any questions or concerns, please do not hesitate to call the hospital.  - Use 2L nasal canula oxygen  - Please follow up with your primary care physician within 2-3 weeks of your discharge from the hospital.         SECONDARY DISCHARGE DIAGNOSES  Diagnosis: Hypoxemia requiring supplemental oxygen  Assessment and Plan of Treatment: Start Nasal canula oxygen and follow up closely with your outpatient provider     PRINCIPAL DISCHARGE DIAGNOSIS  Diagnosis: 2019 novel coronavirus disease (COVID-19)  Assessment and Plan of Treatment: You have tested POSITIVE for the novel coronavirus (COVID-19). Upon discharge, you must self-quarantine for 14 days, or until the Department of Health contacts you. Please wear a face mask if you are around other individuals. Try to avoid contact with house members, family, and friends for the duration of this quarantine. Please take all medications as prescribed. If you experience any worsening or recurrence of your symptoms, particularly worsening or high fever, shortness of breathe, extreme fatigue, or bloody cough please call 9-1-1 immediately or report to the nearest Emergency Department. If you have any questions or concerns, please do not hesitate to call the hospital.  - Use 2L nasal canula oxygen  - Continue 5 more days of Decadron  - Please follow up with your primary care physician within 2-3 weeks of your discharge from the hospital.         SECONDARY DISCHARGE DIAGNOSES  Diagnosis: Hypoxemia requiring supplemental oxygen  Assessment and Plan of Treatment: Start Nasal canula oxygen and follow up closely with your outpatient provider

## 2021-09-13 NOTE — PROGRESS NOTE ADULT - ASSESSMENT
covid 19  IBS  constipation    suspect covid infection worsening her IBS  cont lactulose  s/p mg citrate  no objection to ex lax  cont linzess  she is vegetarian but has been eating meat during this hospitalization, that may be exacerbating her constipation as well  outpatient follow up with her primary GI

## 2021-09-13 NOTE — DISCHARGE NOTE PROVIDER - CARE PROVIDER_API CALL
[facility] [place] [address]  Phone: [phone]  Fax: [phone]  Follow Up Time:

## 2021-09-13 NOTE — DISCHARGE NOTE PROVIDER - HOSPITAL COURSE
FROM ADMISSION H+P:   HPI:  Ms Ferrer is a 80 yo F presenting from home with SOB with a recent COVID-19 exposure [her son]. PMH Hypothyroidism, Irritable Bowel Syndrome  Patient seen and examined at bedside. She reports having SOB and cough for about 4 days now. Her son was COVID-19 positive recently and was deemed "not contagious" as of August 29. Patient developed SOB and non-productive cough about 5 days ago, she also had a fever of 101 F yesterday. She is not vaccinated against COVID-19 because she has many allergies and feels she will be allergic to vaccine components. Denies chills, light-headedness, dizziness, headaches, nausea, vomiting, chest pain, palpitations, abdominal pain, constipation, diarrhea, melena, hematochezia, dysuria.   Patients COVID-19 PCR is still pending from the ER. CXR shows elevated R hemidiaphragm, R upper perihilar infiltrate [?] [official read pending] (07 Sep 2021 08:38)      ---  HOSPITAL COURSE: Patient admitted for COVID PNA. She was started on supplemental oxygen as well as decadron and remdesivir. Patient to be discharged on oxygen. Patient showed improvement throughout hospitalization. Patient was seen and examined on day of discharge. Patient was medically optimized for discharge with close outpatient follow up.     CONSULTANTS:   Jenny Mcmullen      FROM ADMISSION H+P:   HPI:  Ms Ferrer is a 80 yo F presenting from home with SOB with a recent COVID-19 exposure [her son]. PMH Hypothyroidism, Irritable Bowel Syndrome  Patient seen and examined at bedside. She reports having SOB and cough for about 4 days now. Her son was COVID-19 positive recently and was deemed "not contagious" as of August 29. Patient developed SOB and non-productive cough about 5 days ago, she also had a fever of 101 F yesterday. She is not vaccinated against COVID-19 because she has many allergies and feels she will be allergic to vaccine components. Denies chills, light-headedness, dizziness, headaches, nausea, vomiting, chest pain, palpitations, abdominal pain, constipation, diarrhea, melena, hematochezia, dysuria.   Patients COVID-19 PCR is still pending from the ER. CXR shows elevated R hemidiaphragm, R upper perihilar infiltrate [?] [official read pending] (07 Sep 2021 08:38)      ---  HOSPITAL COURSE: Patient admitted for COVID PNA. She was started on supplemental oxygen as well as decadron and remdesivir. Patient to be discharged on oxygen. Patient showed improvement throughout hospitalization. Patient was seen and examined on day of discharge. Patient was medically optimized for discharge with close outpatient follow up.     T(C): 36.8 (09-13-21 @ 05:38), Max: 36.8 (09-13-21 @ 05:38)  HR: 73 (09-13-21 @ 05:38) (63 - 73)  BP: 117/68 (09-13-21 @ 05:38) (117/68 - 129/68)  RR: 19 (09-13-21 @ 05:38) (19 - 20)  SpO2: 90% (09-13-21 @ 05:38) (90% - 95%)    GENERAL: patient appears well, no acute distress, on nasal canula   EYES: sclera clear, no exudates  NECK: Supple, No JVD  NERVOUS SYSTEM:  Alert & Oriented X3  CHEST/LUNG: Clear to auscultation bilaterally; No rales, rhonchi, wheezing, or rubs  HEART: Regular rate and rhythm; No murmurs, rubs, or gallops  ABDOMEN: Soft, Nontender, Nondistended; Bowel sounds present  EXTREMITIES:  2+ Peripheral Pulses, No clubbing, cyanosis, or edema    CONSULTANTS:   Jenny Mcmullen      FROM ADMISSION H+P:   HPI:  Ms Ferrer is a 80 yo F presenting from home with SOB with a recent COVID-19 exposure [her son]. PMH Hypothyroidism, Irritable Bowel Syndrome  Patient seen and examined at bedside. She reports having SOB and cough for about 4 days now. Her son was COVID-19 positive recently and was deemed "not contagious" as of August 29. Patient developed SOB and non-productive cough about 5 days ago, she also had a fever of 101 F yesterday. She is not vaccinated against COVID-19 because she has many allergies and feels she will be allergic to vaccine components. Denies chills, light-headedness, dizziness, headaches, nausea, vomiting, chest pain, palpitations, abdominal pain, constipation, diarrhea, melena, hematochezia, dysuria.   Patients COVID-19 PCR is still pending from the ER. CXR shows elevated R hemidiaphragm, R upper perihilar infiltrate [?] [official read pending] (07 Sep 2021 08:38)      ---  HOSPITAL COURSE: Patient admitted for COVID PNA. She was started on supplemental oxygen as well as decadron and remdesivir. Patient stable on 2L oxygen and to be discharged on oxygen. Patient showed improvement throughout hospitalization. Patient was seen and examined on day of discharge. Patient was medically optimized for discharge with close outpatient follow up.     T(C): 36.8 (09-13-21 @ 05:38), Max: 36.8 (09-13-21 @ 05:38)  HR: 73 (09-13-21 @ 05:38) (63 - 73)  BP: 117/68 (09-13-21 @ 05:38) (117/68 - 129/68)  RR: 19 (09-13-21 @ 05:38) (19 - 20)  SpO2: 90% (09-13-21 @ 05:38) (90% - 95%)    GENERAL: patient appears well, no acute distress, on nasal canula   EYES: sclera clear, no exudates  NECK: Supple, No JVD  NERVOUS SYSTEM:  Alert & Oriented X3  CHEST/LUNG: Clear to auscultation bilaterally; No rales, rhonchi, wheezing, or rubs  HEART: Regular rate and rhythm; No murmurs, rubs, or gallops  ABDOMEN: Soft, Nontender, Nondistended; Bowel sounds present  EXTREMITIES:  2+ Peripheral Pulses, No clubbing, cyanosis, or edema    CONSULTANTS:   Pulnataliia Mcmullen

## 2021-09-13 NOTE — PROGRESS NOTE ADULT - SUBJECTIVE AND OBJECTIVE BOX
[facility]  Dayo Nath PA-C  [address] [place] [address]  [phone]      INTERVAL HPI/OVERNIGHT EVENTS:  80 yo F presenting from home with SOB with a recent COVID-19 exposure [her son]. PMH Hypothyroidism, Irritable Bowel Syndrome  Events noted  Chart reviewed    MEDICATIONS  (STANDING):  dexAMETHasone     Tablet 10 milliGRAM(s) Oral daily  enoxaparin Injectable 40 milliGRAM(s) SubCutaneous daily  lactulose Syrup 20 Gram(s) Oral three times a day  levothyroxine 100 MICROGram(s) Oral daily  linaclotide 290 MICROGram(s) Oral before breakfast  meropenem  IVPB 1000 milliGRAM(s) IV Intermittent every 8 hours  pantoprazole    Tablet 40 milliGRAM(s) Oral before breakfast    MEDICATIONS  (PRN):  acetaminophen   Tablet .. 650 milliGRAM(s) Oral every 6 hours PRN Temp greater or equal to 38.5C (101.3F), Mild Pain (1 - 3)  guaiFENesin Oral Liquid (Sugar-Free) 200 milliGRAM(s) Oral every 6 hours PRN Cough  ondansetron Injectable 4 milliGRAM(s) IV Push every 8 hours PRN Nausea and/or Vomiting      Allergies    Advil (Unknown)  allergic to food coloring (Unknown)  Ceclor (Other)  cephalosporins (Other)  ciprofloxacin (Other)  Compazine (Other)  food coloring red/blue, wheat, tide, morphine, ceclor, compazine, advil, mortim, iv contrast, shellfish, oregano, tomoatoes, pork, peas, mold/spores (Unknown)  morphine (Unknown)  Motrin (Unknown)  Originally Entered as [Rash] reaction to [oregano spice] (Unknown)  peanuts (Other)  pork (Unknown)  shellfish (Unknown)    Intolerances        PHYSICAL EXAM:   Vital Signs:  Vital Signs Last 24 Hrs  T(C): 36.8 (13 Sep 2021 05:38), Max: 36.8 (13 Sep 2021 05:38)  T(F): 98.2 (13 Sep 2021 05:38), Max: 98.2 (13 Sep 2021 05:38)  HR: 73 (13 Sep 2021 05:38) (63 - 73)  BP: 117/68 (13 Sep 2021 05:38) (117/68 - 129/68)  BP(mean): --  RR: 19 (13 Sep 2021 05:38) (19 - 20)  SpO2: 90% (13 Sep 2021 05:38) (90% - 95%)  Daily     Daily Weight in k.6 (13 Sep 2021 05:38)    GENERAL:  Appears stated age,   HEENT:  NC/AT,    CHEST:  Full & symmetric excursion,   HEART:  Regular rhythm,  ABDOMEN:  Soft, non-tender, non-distended,  EXTEREMITIES:  no cyanosis  SKIN:  No rash  NEURO:  Alert,       LABS:                        13.5   15.39 )-----------( 418      ( 13 Sep 2021 10:07 )             41.9     09    137  |  102  |  21  ----------------------------<  133<H>  4.6   |  29  |  0.55    Ca    8.5      13 Sep 2021 10:07    TPro  6.2  /  Alb  2.2<L>  /  TBili  0.7  /  DBili  x   /  AST  36  /  ALT  230<H>  /  AlkPhos  114  09-13    PT/INR - ( 13 Sep 2021 10:07 )   PT: 13.7 sec;   INR: 1.18 ratio               RADIOLOGY & ADDITIONAL TESTS:

## 2021-09-13 NOTE — PROGRESS NOTE ADULT - SUBJECTIVE AND OBJECTIVE BOX
Date/Time Patient Seen:  		  Referring MD:   Data Reviewed	       Patient is a 81y old  Female who presents with a chief complaint of SOB, Hypoxia, suspected COVID-19 infection (12 Sep 2021 14:26)      Subjective/HPI     PAST MEDICAL & SURGICAL HISTORY:  Chronic Back Pain    Hypothyroidism    Edema    Cataract    Spastic colon    Lumbar back pain    Constipation    Osteoarthritis    MRSA cellulitis    MRSA colonization    Diabetes    S/P Hysterectomy  1987    S/P Cholecystectomy  1968    S/P Appendectomy  1968    S/P TKR (Total Knee Replacement)  Right 2015    S/P TKR (total knee replacement)  Left 2009    H/O Spinal surgery  2009    H/O sinus surgery  2/2017    Coello filter in place  2009          Medication list         MEDICATIONS  (STANDING):  dexAMETHasone     Tablet 10 milliGRAM(s) Oral daily  enoxaparin Injectable 40 milliGRAM(s) SubCutaneous daily  lactulose Syrup 20 Gram(s) Oral three times a day  levothyroxine 100 MICROGram(s) Oral daily  linaclotide 290 MICROGram(s) Oral before breakfast  meropenem  IVPB 1000 milliGRAM(s) IV Intermittent every 8 hours  pantoprazole    Tablet 40 milliGRAM(s) Oral before breakfast    MEDICATIONS  (PRN):  acetaminophen   Tablet .. 650 milliGRAM(s) Oral every 6 hours PRN Temp greater or equal to 38.5C (101.3F), Mild Pain (1 - 3)  guaiFENesin Oral Liquid (Sugar-Free) 200 milliGRAM(s) Oral every 6 hours PRN Cough  ondansetron Injectable 4 milliGRAM(s) IV Push every 8 hours PRN Nausea and/or Vomiting         Vitals log        ICU Vital Signs Last 24 Hrs  T(C): 36.8 (13 Sep 2021 05:38), Max: 36.8 (13 Sep 2021 05:38)  T(F): 98.2 (13 Sep 2021 05:38), Max: 98.2 (13 Sep 2021 05:38)  HR: 73 (13 Sep 2021 05:38) (63 - 73)  BP: 117/68 (13 Sep 2021 05:38) (117/68 - 129/68)  BP(mean): --  ABP: --  ABP(mean): --  RR: 19 (13 Sep 2021 05:38) (19 - 20)  SpO2: 90% (13 Sep 2021 05:38) (90% - 95%)           Input and Output:  I&O's Detail    11 Sep 2021 07:01  -  12 Sep 2021 07:00  --------------------------------------------------------  IN:    IV PiggyBack: 300 mL  Total IN: 300 mL    OUT:  Total OUT: 0 mL    Total NET: 300 mL      12 Sep 2021 07:01  -  13 Sep 2021 05:44  --------------------------------------------------------  IN:    IV PiggyBack: 300 mL    Oral Fluid: 480 mL  Total IN: 780 mL    OUT:  Total OUT: 0 mL    Total NET: 780 mL          Lab Data                        12.7   16.09 )-----------( 424      ( 12 Sep 2021 09:21 )             38.2     09-12    138  |  102  |  22  ----------------------------<  106<H>  4.6   |  30  |  0.49<L>    Ca    8.2<L>      12 Sep 2021 09:21    TPro  5.9<L>  /  Alb  2.2<L>  /  TBili  0.7  /  DBili  x   /  AST  79<H>  /  ALT  304<H>  /  AlkPhos  111  09-12            Review of Systems	      Objective     Physical Examination    heart s1s2  lung dec BS  abd soft      Pertinent Lab findings & Imaging      Dorothy:  NO   Adequate UO     I&O's Detail    11 Sep 2021 07:01  -  12 Sep 2021 07:00  --------------------------------------------------------  IN:    IV PiggyBack: 300 mL  Total IN: 300 mL    OUT:  Total OUT: 0 mL    Total NET: 300 mL      12 Sep 2021 07:01  -  13 Sep 2021 05:44  --------------------------------------------------------  IN:    IV PiggyBack: 300 mL    Oral Fluid: 480 mL  Total IN: 780 mL    OUT:  Total OUT: 0 mL    Total NET: 780 mL               Discussed with:     Cultures:	        Radiology

## 2021-09-13 NOTE — PROGRESS NOTE ADULT - ASSESSMENT
Ms Ferrer is a 80 yo F PMH Hypothyroidism, Irritable Bowel Syndrome presenting from home with SOB with a recent COVID-19 exposure [her son] who was diagnosed with COVID here at  but then sent home and his unvaccinated mother cared for him.  She reports   symptom onset 8/30.  She is NOT vaccinated against COVID-19   COVID-19 PCR: Detected (07 Sep 2021 04:49)    RECOMMENDATIONS  9/7- on NC-STEROIDs started, LMWH started  9/8 NC-3L, NLR 6.11/0.87=7, REMDESIVIR started, rec continued steroids and LMWH-continue supportive care, noted asymmetry on CXR so will follow for any indication that abx would be of benefit  9/9 VENTURI, NLR10.23/0.79=13, will increased steroids to 10mg/day-last day 9/16, noted CXR, will added abx to cover for potential secondary bacterial process (complicated list of allergies so started meropenem) ordered additional testing  9/10 escalated to NRB, NLR 8.44/0.73=11.6, progressing w increasing oxygen support despite increased steroids, daughter 'Oxana Ferrer' same name [phone].  9/11-pt de-escalated to NC-5L, noted rise in WBC, pt feels improved, continue meropenem, remdesivir, LMWH, steroids  9/12 NC-4L, pt reports she feels much better day 4 of meropenem   9/13 NC-5L, today, Monday as last day and can start looking at dispo plans for possible dc Weds    We will follow along in the care of this patient. Please call us at [phone] or text me directly on my cell# at [phone] with any concerns.

## 2021-09-13 NOTE — DISCHARGE NOTE PROVIDER - NSDCQMSTROKE_NEU_ALL_CORE
Patient attended Phase 2 Cardiac Rehab Exercise Session. Further documentation will be completed in Cardiac Science/[employer] System and will be scanned into the medical record upon discharge.      
No

## 2021-09-13 NOTE — PROGRESS NOTE ADULT - ASSESSMENT
Ms Ferrer is a 82 yo F presenting from home with SOB found to have COVID-19 infection. PMH Hypothyroidism, Irritable Bowel Syndrome    # COVID-19 Infection: Sepsis criteria met evolving from admission, with acute respiratory failure with hypoxia  -Pt is requiring O2 supplementation as she desaturred to 86% on room air at rest, at 4L she is 90% and will likely require home oxygen for discharge even if pt decides to leave AMA will establish home O2  -cont  remdesmivir and decadron to 10mg daily, if worsens consider toci  -empiric antibx coverage of meropenem as per ID  -apprec ID recs Dr barboza recs  -Dr Wilian akhtar, following.  -CXR shows Right upper lobe infiltrate. Thoracolumbar hardware.  Do not suspect bacterial superinfection at this time.   -remainder as above.    # constipation  - as per pt no bowel movement for about a week  -cont lactulose  -s/p mg citrate  -no objection to ex lax  -cont linzess  -she is vegetarian but has been eating meat during this hospitalization, that may be exacerbating her constipation as well  outpatient follow up with her primary GI      # Hypothyroidism:   -chronic stable  -Continue Levothyroxine.     # Irritable Bowel Syndrome: with constipation for past 8 days as per patient  -cont linzess  -citrate mag, lactulose ordered and son to bring xlax   -continue to monitor for bms  -apprec GI recs Dr Farrar     # DVT ppx: Lovenox for dvt ppx

## 2021-09-13 NOTE — PROGRESS NOTE ADULT - SUBJECTIVE AND OBJECTIVE BOX
[facility] DIVISION of INFECTIOUS DISEASE  Pradeep Mcmullen MD PhD, Hortensia Grewal MD, Sasha Ness MD, Meagan Patrick MD  and providing coverage with Lilly Galindo MD and Hailey Geller MD  Providing Infectious Disease Consultations at [facility]'s      Office# [phone] to schedule follow up appointments  Answering Service for urgent calls or New Consults [phone]  Cell# to text for urgent issues Pradeep Mcmullen [phone]     Infectious diseases progress note:    MICHEL TRENT is a 81y y. o. Female patient    COVID Patient    Allergies    Advil (Unknown)  allergic to food coloring (Unknown)  Ceclor (Other)  cephalosporins (Other)  ciprofloxacin (Other)  Compazine (Other)  food coloring red/blue, wheat, tide, morphine, ceclor, compazine, advil, mortim, iv contrast, shellfish, oregano, tomoatoes, pork, peas, mold/spores (Unknown)  morphine (Unknown)  Motrin (Unknown)  Originally Entered as [Rash] reaction to [oregano spice] (Unknown)  peanuts (Other)  pork (Unknown)  shellfish (Unknown)    Intolerances        ANTIBIOTICS/RELEVANT:  antimicrobials  meropenem  IVPB 1000 milliGRAM(s) IV Intermittent every 8 hours    immunologic:    OTHER:  acetaminophen   Tablet .. 650 milliGRAM(s) Oral every 6 hours PRN  dexAMETHasone     Tablet 10 milliGRAM(s) Oral daily  enoxaparin Injectable 40 milliGRAM(s) SubCutaneous daily  guaiFENesin Oral Liquid (Sugar-Free) 200 milliGRAM(s) Oral every 6 hours PRN  lactulose Syrup 20 Gram(s) Oral three times a day  levothyroxine 100 MICROGram(s) Oral daily  linaclotide 290 MICROGram(s) Oral before breakfast  ondansetron Injectable 4 milliGRAM(s) IV Push every 8 hours PRN  pantoprazole    Tablet 40 milliGRAM(s) Oral before breakfast      Objective:  Vital Signs Last 24 Hrs  T(C): 36.8 (13 Sep 2021 05:38), Max: 36.8 (13 Sep 2021 05:38)  T(F): 98.2 (13 Sep 2021 05:38), Max: 98.2 (13 Sep 2021 05:38)  HR: 73 (13 Sep 2021 05:38) (63 - 73)  BP: 117/68 (13 Sep 2021 05:38) (117/68 - 129/68)  BP(mean): --  RR: 19 (13 Sep 2021 05:38) (19 - 20)  SpO2: 90% (13 Sep 2021 05:38) (90% - 95%)    T(C): 36.8 (09-13-21 @ 05:38), Max: 36.8 (09-12-21 @ 04:50)  T(C): 36.8 (09-13-21 @ 05:38), Max: 36.9 (09-10-21 @ 13:17)  T(C): 36.8 (09-13-21 @ 05:38), Max: 36.9 (09-10-21 @ 13:17)    PHYSICAL EXAM:  HEENT: NC atraumatic  Neck: supple  Respiratory: no accessory muscle use, breathing comfortably  Cardiovascular: distant  Gastrointestinal: normal appearing, nondistended  Extremities: no clubbing, no cyanosis,        LABS:                          13.5   15.39 )-----------( 418      ( 13 Sep 2021 10:07 )             41.9       15.39 09-13 @ 10:07  16.09 09-12 @ 09:21  17.73 09-11 @ 08:39  9.82 09-10 @ 12:51  9.50 09-10 @ 09:07  11.53 09-09 @ 08:22  7.45 09-08 @ 11:20  10.76 09-07 @ 05:19      09-13    137  |  102  |  21  ----------------------------<  133<H>  4.6   |  29  |  0.55    Ca    8.5      13 Sep 2021 10:07    TPro  6.2  /  Alb  2.2<L>  /  TBili  0.7  /  DBili  x   /  AST  36  /  ALT  230<H>  /  AlkPhos  114  09-13      Creatinine, Serum: 0.55 mg/dL (09-13-21 @ 10:07)  Creatinine, Serum: 0.49 mg/dL (09-12-21 @ 09:21)  Creatinine, Serum: 0.67 mg/dL (09-11-21 @ 08:39)  Creatinine, Serum: 0.70 mg/dL (09-10-21 @ 12:51)  Creatinine, Serum: 0.78 mg/dL (09-10-21 @ 09:07)  Creatinine, Serum: 0.81 mg/dL (09-09-21 @ 18:36)  Creatinine, Serum: 0.93 mg/dL (09-08-21 @ 13:08)  Creatinine, Serum: 0.82 mg/dL (09-07-21 @ 05:19)      PT/INR - ( 13 Sep 2021 10:07 )   PT: 13.7 sec;   INR: 1.18 ratio                   COVID RISK SCORE  Auto Neutrophil #: 15.51 K/uL (09-11-21 @ 08:39)  Auto Lymphocyte #: 0.97 K/uL (09-11-21 @ 08:39)  Auto Neutrophil #: 8.67 K/uL (09-10-21 @ 12:51)  Auto Lymphocyte #: 0.70 K/uL (09-10-21 @ 12:51)  Auto Neutrophil #: 8.44 K/uL (09-10-21 @ 09:07)  Auto Lymphocyte #: 0.73 K/uL (09-10-21 @ 09:07)  Auto Neutrophil #: 10.23 K/uL (09-09-21 @ 08:22)  Auto Lymphocyte #: 0.79 K/uL (09-09-21 @ 08:22)  Auto Neutrophil #: 6.11 K/uL (09-08-21 @ 11:20)  Auto Lymphocyte #: 0.87 K/uL (09-08-21 @ 11:20)  Auto Neutrophil #: 9.38 K/uL (09-07-21 @ 05:19)  Auto Lymphocyte #: 0.93 K/uL (09-07-21 @ 05:19)    Lactate, Blood: 1.1 mmol/L (09-07-21 @ 05:19)    Auto Eosinophil #: 0.01 K/uL (09-11-21 @ 08:39)  Auto Eosinophil #: 0.00 K/uL (09-10-21 @ 12:51)  Auto Eosinophil #: 0.00 K/uL (09-10-21 @ 09:07)  Auto Eosinophil #: 0.00 K/uL (09-09-21 @ 08:22)  Auto Eosinophil #: 0.01 K/uL (09-08-21 @ 11:20)  Auto Eosinophil #: 0.01 K/uL (09-07-21 @ 05:19)        Procalcitonin, Serum: 0.98 ng/mL (09-09-21 @ 08:22)            Ferritin, Serum: 812 ng/mL (09-09-21 @ 12:20)        INR: 1.18 ratio (09-13-21 @ 10:07)  INR: 1.23 ratio (09-12-21 @ 09:21)  INR: 1.23 ratio (09-11-21 @ 08:39)  INR: 1.27 ratio (09-10-21 @ 09:07)  INR: 1.28 ratio (09-09-21 @ 08:22)  INR: 1.40 ratio (09-08-21 @ 17:29)  Activated Partial Thromboplastin Time: 40.6 sec (09-08-21 @ 17:29)  Activated Partial Thromboplastin Time: 34.1 sec (09-07-21 @ 05:19)  INR: 1.13 ratio (09-07-21 @ 05:19)    D-Dimer Assay, Quantitative: 246 ng/mL DDU (09-10-21 @ 09:07)  D-Dimer Assay, Quantitative: 231 ng/mL DDU (09-09-21 @ 08:22)        MICROBIOLOGY:              RADIOLOGY & ADDITIONAL STUDIES:

## 2021-09-13 NOTE — DISCHARGE NOTE PROVIDER - NSDCMRMEDTOKEN_GEN_ALL_CORE_FT
Centrum oral tablet: 1 tab(s) orally once a day  Linzess 290 mcg oral capsule: 1 cap(s) orally once a day  meloxicam 15 mg oral tablet: 1 tab(s) orally once a day  ondansetron 4 mg oral tablet: 1 tab(s) orally once a day  potassium chloride 20 mEq oral tablet, extended release: 1 tab(s) orally 3 times a day  Synthroid 100 mcg (0.1 mg) oral tablet: 1 tab(s) orally once a day   Centrum oral tablet: 1 tab(s) orally once a day  Linzess 290 mcg oral capsule: 1 cap(s) orally once a day  meloxicam 15 mg oral tablet: 1 tab(s) orally once a day  ondansetron 4 mg oral tablet: 1 tab(s) orally once a day  Synthroid 100 mcg (0.1 mg) oral tablet: 1 tab(s) orally once a day   Centrum oral tablet: 1 tab(s) orally once a day  dexamethasone 6 mg oral tablet: 1 tab(s) orally once a day   Home O2 concentrator and portable: 2L Nasal cannula continuous  ICD10 CODE U07.1 COVID-19  COLLEEN  6 Months  Linzess 290 mcg oral capsule: 1 cap(s) orally once a day  meloxicam 15 mg oral tablet: 1 tab(s) orally once a day  ondansetron 4 mg oral tablet: 1 tab(s) orally once a day  Synthroid 100 mcg (0.1 mg) oral tablet: 1 tab(s) orally once a day   dexamethasone 6 mg oral tablet: 1 tab(s) orally once a day   Home O2 concentrator and portable: 2L Nasal cannula continuous  ICD10 CODE U07.1 COVID-19  COLLEEN  6 Months  Linzess 290 mcg oral capsule: 1 cap(s) orally once a day  meloxicam 15 mg oral tablet: 1 tab(s) orally once a day  ondansetron 4 mg oral tablet: 1 tab(s) orally once a day  rivaroxaban 10 mg oral tablet: 1 tab(s) orally once a day   Synthroid 100 mcg (0.1 mg) oral tablet: 1 tab(s) orally once a day

## 2021-09-14 ENCOUNTER — TRANSCRIPTION ENCOUNTER (OUTPATIENT)
Age: 81
End: 2021-09-14

## 2021-09-14 VITALS
RESPIRATION RATE: 18 BRPM | TEMPERATURE: 98 F | HEART RATE: 71 BPM | SYSTOLIC BLOOD PRESSURE: 136 MMHG | OXYGEN SATURATION: 93 % | DIASTOLIC BLOOD PRESSURE: 73 MMHG

## 2021-09-14 LAB
ALBUMIN SERPL ELPH-MCNC: 2.1 G/DL — LOW (ref 3.3–5)
ALP SERPL-CCNC: 106 U/L — SIGNIFICANT CHANGE UP (ref 40–120)
ALT FLD-CCNC: 171 U/L — HIGH (ref 12–78)
ANION GAP SERPL CALC-SCNC: 3 MMOL/L — LOW (ref 5–17)
AST SERPL-CCNC: 26 U/L — SIGNIFICANT CHANGE UP (ref 15–37)
BILIRUB SERPL-MCNC: 0.6 MG/DL — SIGNIFICANT CHANGE UP (ref 0.2–1.2)
BUN SERPL-MCNC: 23 MG/DL — SIGNIFICANT CHANGE UP (ref 7–23)
CALCIUM SERPL-MCNC: 8.7 MG/DL — SIGNIFICANT CHANGE UP (ref 8.5–10.1)
CHLORIDE SERPL-SCNC: 104 MMOL/L — SIGNIFICANT CHANGE UP (ref 96–108)
CO2 SERPL-SCNC: 31 MMOL/L — SIGNIFICANT CHANGE UP (ref 22–31)
CREAT SERPL-MCNC: 0.43 MG/DL — LOW (ref 0.5–1.3)
GLUCOSE SERPL-MCNC: 108 MG/DL — HIGH (ref 70–99)
HCT VFR BLD CALC: 37.8 % — SIGNIFICANT CHANGE UP (ref 34.5–45)
HGB BLD-MCNC: 12.4 G/DL — SIGNIFICANT CHANGE UP (ref 11.5–15.5)
INR BLD: 1.14 RATIO — SIGNIFICANT CHANGE UP (ref 0.88–1.16)
MCHC RBC-ENTMCNC: 30.3 PG — SIGNIFICANT CHANGE UP (ref 27–34)
MCHC RBC-ENTMCNC: 32.8 GM/DL — SIGNIFICANT CHANGE UP (ref 32–36)
MCV RBC AUTO: 92.4 FL — SIGNIFICANT CHANGE UP (ref 80–100)
NRBC # BLD: 0 /100 WBCS — SIGNIFICANT CHANGE UP (ref 0–0)
PLATELET # BLD AUTO: 444 K/UL — HIGH (ref 150–400)
POTASSIUM SERPL-MCNC: 4.5 MMOL/L — SIGNIFICANT CHANGE UP (ref 3.5–5.3)
POTASSIUM SERPL-SCNC: 4.5 MMOL/L — SIGNIFICANT CHANGE UP (ref 3.5–5.3)
PROT SERPL-MCNC: 5.7 G/DL — LOW (ref 6–8.3)
PROTHROM AB SERPL-ACNC: 13.2 SEC — SIGNIFICANT CHANGE UP (ref 10.6–13.6)
RBC # BLD: 4.09 M/UL — SIGNIFICANT CHANGE UP (ref 3.8–5.2)
RBC # FLD: 12.7 % — SIGNIFICANT CHANGE UP (ref 10.3–14.5)
SODIUM SERPL-SCNC: 138 MMOL/L — SIGNIFICANT CHANGE UP (ref 135–145)
WBC # BLD: 15.65 K/UL — HIGH (ref 3.8–10.5)
WBC # FLD AUTO: 15.65 K/UL — HIGH (ref 3.8–10.5)

## 2021-09-14 PROCEDURE — 96375 TX/PRO/DX INJ NEW DRUG ADDON: CPT

## 2021-09-14 PROCEDURE — 80053 COMPREHEN METABOLIC PANEL: CPT

## 2021-09-14 PROCEDURE — 87040 BLOOD CULTURE FOR BACTERIA: CPT

## 2021-09-14 PROCEDURE — 82962 GLUCOSE BLOOD TEST: CPT

## 2021-09-14 PROCEDURE — 86769 SARS-COV-2 COVID-19 ANTIBODY: CPT

## 2021-09-14 PROCEDURE — 82728 ASSAY OF FERRITIN: CPT

## 2021-09-14 PROCEDURE — 85025 COMPLETE CBC W/AUTO DIFF WBC: CPT

## 2021-09-14 PROCEDURE — U0005: CPT

## 2021-09-14 PROCEDURE — 99285 EMERGENCY DEPT VISIT HI MDM: CPT

## 2021-09-14 PROCEDURE — 80048 BASIC METABOLIC PNL TOTAL CA: CPT

## 2021-09-14 PROCEDURE — 87640 STAPH A DNA AMP PROBE: CPT

## 2021-09-14 PROCEDURE — 85027 COMPLETE CBC AUTOMATED: CPT

## 2021-09-14 PROCEDURE — 71045 X-RAY EXAM CHEST 1 VIEW: CPT

## 2021-09-14 PROCEDURE — 93005 ELECTROCARDIOGRAM TRACING: CPT

## 2021-09-14 PROCEDURE — 84145 PROCALCITONIN (PCT): CPT

## 2021-09-14 PROCEDURE — 85379 FIBRIN DEGRADATION QUANT: CPT

## 2021-09-14 PROCEDURE — 83036 HEMOGLOBIN GLYCOSYLATED A1C: CPT

## 2021-09-14 PROCEDURE — 96374 THER/PROPH/DIAG INJ IV PUSH: CPT

## 2021-09-14 PROCEDURE — 87641 MR-STAPH DNA AMP PROBE: CPT

## 2021-09-14 PROCEDURE — 96361 HYDRATE IV INFUSION ADD-ON: CPT

## 2021-09-14 PROCEDURE — 36415 COLL VENOUS BLD VENIPUNCTURE: CPT

## 2021-09-14 PROCEDURE — 85610 PROTHROMBIN TIME: CPT

## 2021-09-14 PROCEDURE — 83605 ASSAY OF LACTIC ACID: CPT

## 2021-09-14 PROCEDURE — 99239 HOSP IP/OBS DSCHRG MGMT >30: CPT

## 2021-09-14 PROCEDURE — U0003: CPT

## 2021-09-14 PROCEDURE — 85730 THROMBOPLASTIN TIME PARTIAL: CPT

## 2021-09-14 PROCEDURE — 87449 NOS EACH ORGANISM AG IA: CPT

## 2021-09-14 RX ORDER — DEXAMETHASONE 0.5 MG/5ML
1 ELIXIR ORAL
Qty: 5 | Refills: 0
Start: 2021-09-14 | End: 2021-09-18

## 2021-09-14 RX ORDER — MULTIVIT-MIN/FERROUS GLUCONATE 9 MG/15 ML
1 LIQUID (ML) ORAL
Qty: 0 | Refills: 0 | DISCHARGE

## 2021-09-14 RX ORDER — RIVAROXABAN 15 MG-20MG
1 KIT ORAL
Qty: 30 | Refills: 0
Start: 2021-09-14 | End: 2021-10-13

## 2021-09-14 RX ORDER — POTASSIUM CHLORIDE 20 MEQ
1 PACKET (EA) ORAL
Qty: 0 | Refills: 0 | DISCHARGE

## 2021-09-14 RX ADMIN — Medication 100 MICROGRAM(S): at 05:15

## 2021-09-14 RX ADMIN — MEROPENEM 100 MILLIGRAM(S): 1 INJECTION INTRAVENOUS at 06:16

## 2021-09-14 RX ADMIN — ENOXAPARIN SODIUM 40 MILLIGRAM(S): 100 INJECTION SUBCUTANEOUS at 11:21

## 2021-09-14 RX ADMIN — LINACLOTIDE 290 MICROGRAM(S): 145 CAPSULE, GELATIN COATED ORAL at 08:00

## 2021-09-14 RX ADMIN — Medication 10 MILLIGRAM(S): at 05:14

## 2021-09-14 RX ADMIN — PANTOPRAZOLE SODIUM 40 MILLIGRAM(S): 20 TABLET, DELAYED RELEASE ORAL at 08:00

## 2021-09-14 NOTE — DISCHARGE NOTE NURSING/CASE MANAGEMENT/SOCIAL WORK - NSDCPETBCESMAN_GEN_ALL_CORE
If you are a smoker, it is important for your health to stop smoking. Please be aware that second hand smoke is also harmful. coccyx

## 2021-09-14 NOTE — PROGRESS NOTE ADULT - SUBJECTIVE AND OBJECTIVE BOX
Date/Time Patient Seen:  		  Referring MD:   Data Reviewed	       Patient is a 81y old  Female who presents with a chief complaint of SOB, Hypoxia, suspected COVID-19 infection (13 Sep 2021 16:23)      Subjective/HPI     PAST MEDICAL & SURGICAL HISTORY:  Chronic Back Pain    Hypothyroidism    Edema    Cataract    Spastic colon    Lumbar back pain    Constipation    Osteoarthritis    MRSA cellulitis    MRSA colonization    Diabetes    S/P Hysterectomy  1987    S/P Cholecystectomy  1968    S/P Appendectomy  1968    S/P TKR (Total Knee Replacement)  Right 2015    S/P TKR (total knee replacement)  Left 2009    H/O Spinal surgery  2009    H/O sinus surgery  2/2017    [place] filter in place  2009          Medication list         MEDICATIONS  (STANDING):  dexAMETHasone     Tablet 10 milliGRAM(s) Oral daily  enoxaparin Injectable 40 milliGRAM(s) SubCutaneous daily  lactulose Syrup 20 Gram(s) Oral three times a day  levothyroxine 100 MICROGram(s) Oral daily  linaclotide 290 MICROGram(s) Oral before breakfast  meropenem  IVPB 1000 milliGRAM(s) IV Intermittent every 8 hours  pantoprazole    Tablet 40 milliGRAM(s) Oral before breakfast    MEDICATIONS  (PRN):  acetaminophen   Tablet .. 650 milliGRAM(s) Oral every 6 hours PRN Temp greater or equal to 38.5C (101.3F), Mild Pain (1 - 3)  guaiFENesin Oral Liquid (Sugar-Free) 200 milliGRAM(s) Oral every 6 hours PRN Cough  ondansetron Injectable 4 milliGRAM(s) IV Push every 8 hours PRN Nausea and/or Vomiting         Vitals log        ICU Vital Signs Last 24 Hrs  T(C): 36.5 (14 Sep 2021 04:37), Max: 36.6 (13 Sep 2021 13:07)  T(F): 97.7 (14 Sep 2021 04:37), Max: 97.9 (13 Sep 2021 13:07)  HR: 63 (14 Sep 2021 04:37) (63 - 71)  BP: 120/69 (14 Sep 2021 04:37) (103/64 - 121/64)  BP(mean): --  ABP: --  ABP(mean): --  RR: 20 (14 Sep 2021 04:37) (20 - 20)  SpO2: 94% (14 Sep 2021 04:37) (91% - 94%)           Input and Output:  I&O's Detail    12 Sep 2021 07:01  -  13 Sep 2021 07:00  --------------------------------------------------------  IN:    IV PiggyBack: 300 mL    Oral Fluid: 480 mL  Total IN: 780 mL    OUT:  Total OUT: 0 mL    Total NET: 780 mL          Lab Data                        13.5   15.39 )-----------( 418      ( 13 Sep 2021 10:07 )             41.9     09-13    137  |  102  |  21  ----------------------------<  133<H>  4.6   |  29  |  0.55    Ca    8.5      13 Sep 2021 10:07    TPro  6.2  /  Alb  2.2<L>  /  TBili  0.7  /  DBili  x   /  AST  36  /  ALT  230<H>  /  AlkPhos  114  09-13            Review of Systems	      Objective     Physical Examination  heart s1s2  lung dec BS  abd soft        Pertinent Lab findings & Imaging      Dorothy:  NO   Adequate UO     I&O's Detail    12 Sep 2021 07:01  -  13 Sep 2021 07:00  --------------------------------------------------------  IN:    IV PiggyBack: 300 mL    Oral Fluid: 480 mL  Total IN: 780 mL    OUT:  Total OUT: 0 mL    Total NET: 780 mL               Discussed with:     Cultures:	        Radiology

## 2021-09-14 NOTE — PROGRESS NOTE ADULT - PROVIDER SPECIALTY LIST ADULT
Hospitalist
Hospitalist
Infectious Disease
Pulmonology
Hospitalist
Hospitalist
Infectious Disease
Pulmonology
Pulmonology
Gastroenterology
Gastroenterology
Hospitalist
Infectious Disease
Infectious Disease
Pulmonology
Hospitalist
Hospitalist
Pulmonology
Pulmonology

## 2021-09-14 NOTE — PROGRESS NOTE ADULT - ASSESSMENT
Ms Ferrer is a 80 yo F PMH Hypothyroidism, Irritable Bowel Syndrome presenting from home with SOB with a recent COVID-19 exposure [her son] who was diagnosed with COVID here at  but then sent home and his unvaccinated mother cared for him.  She reports   symptom onset 8/30.  She is NOT vaccinated against COVID-19   COVID-19 PCR: Detected (07 Sep 2021 04:49)    RECOMMENDATIONS  9/7- on NC-STEROIDs started, LMWH started  9/8 NC-3L, NLR 6.11/0.87=7, REMDESIVIR started, rec continued steroids and LMWH-continue supportive care, noted asymmetry on CXR so will follow for any indication that abx would be of benefit  9/9 VENTURI, NLR10.23/0.79=13, will increased steroids to 10mg/day-last day 9/16, noted CXR, will added abx to cover for potential secondary bacterial process (complicated list of allergies so started meropenem) ordered additional testing  9/10 escalated to NRB, NLR 8.44/0.73=11.6, progressing w increasing oxygen support despite increased steroids, daughter 'Oxana Ferrer' same name [phone].  9/11-pt de-escalated to NC-5L, noted rise in WBC, pt feels improved, continue meropenem, remdesivir, LMWH, steroids  9/12 NC-4L, pt reports she feels much better day 4 of meropenem   9/13 NC-5L, today, Monday as last day of abx   9/14 NC-3L, pt reporting she feels well -can start looking at dispo plans for possible dc Weds    We will follow along in the care of this patient. Please call us at [phone] or text me directly on my cell# at [phone] with any concerns.

## 2021-09-14 NOTE — DIETITIAN INITIAL EVALUATION ADULT. - OTHER INFO
82 y/o female adm with COVID 19. PMH DM, OA, constipation, hypothyroidism, chronic back pain, IBS. Pt visited at bedside this am. Pt reports, appetite has been good, tolerating meals well. Pt has been receiving vegetarian options on trays, as per pt's preference. Pt has been having constipation. Last BM 9/13. Bowel regimen ordered.

## 2021-09-14 NOTE — PROGRESS NOTE ADULT - REASON FOR ADMISSION
SOB, Hypoxia, suspected COVID-19 infection

## 2021-09-14 NOTE — PROGRESS NOTE ADULT - SUBJECTIVE AND OBJECTIVE BOX
[facility] DIVISION of INFECTIOUS DISEASE  Pradeep Mcmullen MD PhD, Hortensia Grewal MD, Sasha Ness MD, Meagan Patrick MD  and providing coverage with Lilly Galindo MD and Hailey Geller MD  Providing Infectious Disease Consultations at [facility]'s      Office# [phone] to schedule follow up appointments  Answering Service for urgent calls or New Consults [phone]  Cell# to text for urgent issues Pradeep Mcmullen [phone]     Infectious diseases progress note:    MCIHEL TRENT is a 81y y. o. Female patient    COVID Patient    Allergies    Advil (Unknown)  allergic to food coloring (Unknown)  Ceclor (Other)  cephalosporins (Other)  ciprofloxacin (Other)  Compazine (Other)  food coloring red/blue, wheat, tide, morphine, ceclor, compazine, advil, mortim, iv contrast, shellfish, oregano, tomoatoes, pork, peas, mold/spores (Unknown)  morphine (Unknown)  Motrin (Unknown)  Originally Entered as [Rash] reaction to [oregano spice] (Unknown)  peanuts (Other)  pork (Unknown)  shellfish (Unknown)    Intolerances        ANTIBIOTICS/RELEVANT:  antimicrobials    immunologic:    OTHER:  acetaminophen   Tablet .. 650 milliGRAM(s) Oral every 6 hours PRN  dexAMETHasone     Tablet 10 milliGRAM(s) Oral daily  enoxaparin Injectable 40 milliGRAM(s) SubCutaneous daily  guaiFENesin Oral Liquid (Sugar-Free) 200 milliGRAM(s) Oral every 6 hours PRN  lactulose Syrup 20 Gram(s) Oral three times a day  levothyroxine 100 MICROGram(s) Oral daily  linaclotide 290 MICROGram(s) Oral before breakfast  ondansetron Injectable 4 milliGRAM(s) IV Push every 8 hours PRN  pantoprazole    Tablet 40 milliGRAM(s) Oral before breakfast      Objective:  Vital Signs Last 24 Hrs  T(C): 36.5 (14 Sep 2021 04:37), Max: 36.6 (13 Sep 2021 13:07)  T(F): 97.7 (14 Sep 2021 04:37), Max: 97.9 (13 Sep 2021 13:07)  HR: 63 (14 Sep 2021 04:37) (63 - 71)  BP: 120/69 (14 Sep 2021 04:37) (103/64 - 121/64)  BP(mean): --  RR: 20 (14 Sep 2021 04:37) (20 - 20)  SpO2: 94% (14 Sep 2021 04:37) (91% - 94%)    T(C): 36.5 (09-14-21 @ 04:37), Max: 36.8 (09-13-21 @ 05:38)  T(C): 36.5 (09-14-21 @ 04:37), Max: 36.8 (09-12-21 @ 04:50)  T(C): 36.5 (09-14-21 @ 04:37), Max: 36.9 (09-10-21 @ 13:17)    PHYSICAL EXAM:  HEENT: NC atraumatic  Neck: supple  Respiratory: no accessory muscle use, breathing comfortably  Cardiovascular: distant  Gastrointestinal: normal appearing, nondistended  Extremities: no clubbing, no cyanosis,        LABS:                          12.4   15.65 )-----------( 444      ( 14 Sep 2021 08:11 )             37.8       15.65 09-14 @ 08:11  15.39 09-13 @ 10:07  16.09 09-12 @ 09:21  17.73 09-11 @ 08:39  9.82 09-10 @ 12:51  9.50 09-10 @ 09:07  11.53 09-09 @ 08:22  7.45 09-08 @ 11:20      09-14    138  |  104  |  23  ----------------------------<  108<H>  4.5   |  31  |  0.43<L>    Ca    8.7      14 Sep 2021 08:11    TPro  5.7<L>  /  Alb  2.1<L>  /  TBili  0.6  /  DBili  x   /  AST  26  /  ALT  171<H>  /  AlkPhos  106  09-14      Creatinine, Serum: 0.43 mg/dL (09-14-21 @ 08:11)  Creatinine, Serum: 0.55 mg/dL (09-13-21 @ 10:07)  Creatinine, Serum: 0.49 mg/dL (09-12-21 @ 09:21)  Creatinine, Serum: 0.67 mg/dL (09-11-21 @ 08:39)  Creatinine, Serum: 0.70 mg/dL (09-10-21 @ 12:51)  Creatinine, Serum: 0.78 mg/dL (09-10-21 @ 09:07)  Creatinine, Serum: 0.81 mg/dL (09-09-21 @ 18:36)  Creatinine, Serum: 0.93 mg/dL (09-08-21 @ 13:08)      PT/INR - ( 14 Sep 2021 08:11 )   PT: 13.2 sec;   INR: 1.14 ratio                   COVID RISK SCORE  Auto Neutrophil #: 15.51 K/uL (09-11-21 @ 08:39)  Auto Lymphocyte #: 0.97 K/uL (09-11-21 @ 08:39)  Auto Neutrophil #: 8.67 K/uL (09-10-21 @ 12:51)  Auto Lymphocyte #: 0.70 K/uL (09-10-21 @ 12:51)  Auto Neutrophil #: 8.44 K/uL (09-10-21 @ 09:07)  Auto Lymphocyte #: 0.73 K/uL (09-10-21 @ 09:07)  Auto Neutrophil #: 10.23 K/uL (09-09-21 @ 08:22)  Auto Lymphocyte #: 0.79 K/uL (09-09-21 @ 08:22)  Auto Neutrophil #: 6.11 K/uL (09-08-21 @ 11:20)  Auto Lymphocyte #: 0.87 K/uL (09-08-21 @ 11:20)  Auto Neutrophil #: 9.38 K/uL (09-07-21 @ 05:19)  Auto Lymphocyte #: 0.93 K/uL (09-07-21 @ 05:19)    Lactate, Blood: 1.1 mmol/L (09-07-21 @ 05:19)    Auto Eosinophil #: 0.01 K/uL (09-11-21 @ 08:39)  Auto Eosinophil #: 0.00 K/uL (09-10-21 @ 12:51)  Auto Eosinophil #: 0.00 K/uL (09-10-21 @ 09:07)  Auto Eosinophil #: 0.00 K/uL (09-09-21 @ 08:22)  Auto Eosinophil #: 0.01 K/uL (09-08-21 @ 11:20)  Auto Eosinophil #: 0.01 K/uL (09-07-21 @ 05:19)        Procalcitonin, Serum: 0.98 ng/mL (09-09-21 @ 08:22)            Ferritin, Serum: 812 ng/mL (09-09-21 @ 12:20)        INR: 1.14 ratio (09-14-21 @ 08:11)  INR: 1.18 ratio (09-13-21 @ 10:07)  INR: 1.23 ratio (09-12-21 @ 09:21)  INR: 1.23 ratio (09-11-21 @ 08:39)  INR: 1.27 ratio (09-10-21 @ 09:07)  INR: 1.28 ratio (09-09-21 @ 08:22)  INR: 1.40 ratio (09-08-21 @ 17:29)  Activated Partial Thromboplastin Time: 40.6 sec (09-08-21 @ 17:29)  Activated Partial Thromboplastin Time: 34.1 sec (09-07-21 @ 05:19)  INR: 1.13 ratio (09-07-21 @ 05:19)    D-Dimer Assay, Quantitative: 246 ng/mL DDU (09-10-21 @ 09:07)  D-Dimer Assay, Quantitative: 231 ng/mL DDU (09-09-21 @ 08:22)        MICROBIOLOGY:              RADIOLOGY & ADDITIONAL STUDIES:

## 2021-09-14 NOTE — DISCHARGE NOTE NURSING/CASE MANAGEMENT/SOCIAL WORK - PATIENT PORTAL LINK FT
You can access the FollowMyHealth Patient Portal offered by [facility] by registering at the following website: [email or web address]. By joining [facility]’s FollowMyHealth portal, you will also be able to view your health information using other applications (apps) compatible with our system.

## 2021-09-14 NOTE — PROGRESS NOTE ADULT - ATTENDING COMMENTS
d/c planning
# COVID-19 Infection: Sepsis criteria met evolving from admission, with acute respiratory failure with hypoxia  -Pt is requiring O2 supplementation as she desaturred to 86% on room air at rest, at 4L she is 90% and will likely require home oxygen for discharge even if pt decides to leave AMA will establish home O2  -comp;leted remdesmivir and decadron to 10mg daily,   -empiric antibx coverage of meropenem as per ID  -apprec ID recs Dr barboza recs  -Dr Wilian akhtar, following.  -CXR shows Right upper lobe infiltrate. Thoracolumbar hardware.  Do not suspect bacterial superinfection at this time.   -remainder as above.  d/c planning

## 2021-09-14 NOTE — PROGRESS NOTE ADULT - SUBJECTIVE AND OBJECTIVE BOX
[facility]  Daoy Nath PA-C  [address] [place] [address]  [phone]      INTERVAL HPI/OVERNIGHT EVENTS:  82 yo F presenting from home with SOB with a recent COVID-19 exposure [her son]. PMH Hypothyroidism, Irritable Bowel Syndrome  Events noted  Chart reviewed    MEDICATIONS  (STANDING):  dexAMETHasone     Tablet 10 milliGRAM(s) Oral daily  enoxaparin Injectable 40 milliGRAM(s) SubCutaneous daily  lactulose Syrup 20 Gram(s) Oral three times a day  levothyroxine 100 MICROGram(s) Oral daily  linaclotide 290 MICROGram(s) Oral before breakfast  meropenem  IVPB 1000 milliGRAM(s) IV Intermittent every 8 hours  pantoprazole    Tablet 40 milliGRAM(s) Oral before breakfast    MEDICATIONS  (PRN):  acetaminophen   Tablet .. 650 milliGRAM(s) Oral every 6 hours PRN Temp greater or equal to 38.5C (101.3F), Mild Pain (1 - 3)  guaiFENesin Oral Liquid (Sugar-Free) 200 milliGRAM(s) Oral every 6 hours PRN Cough  ondansetron Injectable 4 milliGRAM(s) IV Push every 8 hours PRN Nausea and/or Vomiting      Allergies    Advil (Unknown)  allergic to food coloring (Unknown)  Ceclor (Other)  cephalosporins (Other)  ciprofloxacin (Other)  Compazine (Other)  food coloring red/blue, wheat, tide, morphine, ceclor, compazine, advil, mortim, iv contrast, shellfish, oregano, tomoatoes, pork, peas, mold/spores (Unknown)  morphine (Unknown)  Motrin (Unknown)  Originally Entered as [Rash] reaction to [oregano spice] (Unknown)  peanuts (Other)  pork (Unknown)  shellfish (Unknown)    Intolerances        PHYSICAL EXAM:   Vital Signs:  Vital Signs Last 24 Hrs  T(C): 36.8 (13 Sep 2021 05:38), Max: 36.8 (13 Sep 2021 05:38)  T(F): 98.2 (13 Sep 2021 05:38), Max: 98.2 (13 Sep 2021 05:38)  HR: 73 (13 Sep 2021 05:38) (63 - 73)  BP: 117/68 (13 Sep 2021 05:38) (117/68 - 129/68)  BP(mean): --  RR: 19 (13 Sep 2021 05:38) (19 - 20)  SpO2: 90% (13 Sep 2021 05:38) (90% - 95%)  Daily     Daily Weight in k.6 (13 Sep 2021 05:38)    GENERAL:  Appears stated age,   HEENT:  NC/AT,    CHEST:  Full & symmetric excursion,   HEART:  Regular rhythm,  ABDOMEN:  Soft, non-tender, non-distended,  EXTEREMITIES:  no cyanosis  SKIN:  No rash  NEURO:  Alert,       LABS:                        13.5   15.39 )-----------( 418      ( 13 Sep 2021 10:07 )             41.9     09    137  |  102  |  21  ----------------------------<  133<H>  4.6   |  29  |  0.55    Ca    8.5      13 Sep 2021 10:07    TPro  6.2  /  Alb  2.2<L>  /  TBili  0.7  /  DBili  x   /  AST  36  /  ALT  230<H>  /  AlkPhos  114  09-13    PT/INR - ( 13 Sep 2021 10:07 )   PT: 13.7 sec;   INR: 1.18 ratio               RADIOLOGY & ADDITIONAL TESTS:

## 2021-09-14 NOTE — PROGRESS NOTE ADULT - SUBJECTIVE AND OBJECTIVE BOX
Patient is a 81y old  Female who presents with a chief complaint of SOB, Hypoxia, suspected COVID-19 infection (14 Sep 2021 10:31)      INTERVAL HPI/OVERNIGHT EVENTS: Patient seen and examined at the bedside.     MEDICATIONS  (STANDING):  dexAMETHasone     Tablet 10 milliGRAM(s) Oral daily  enoxaparin Injectable 40 milliGRAM(s) SubCutaneous daily  lactulose Syrup 20 Gram(s) Oral three times a day  levothyroxine 100 MICROGram(s) Oral daily  linaclotide 290 MICROGram(s) Oral before breakfast  pantoprazole    Tablet 40 milliGRAM(s) Oral before breakfast    MEDICATIONS  (PRN):  acetaminophen   Tablet .. 650 milliGRAM(s) Oral every 6 hours PRN Temp greater or equal to 38.5C (101.3F), Mild Pain (1 - 3)  guaiFENesin Oral Liquid (Sugar-Free) 200 milliGRAM(s) Oral every 6 hours PRN Cough  ondansetron Injectable 4 milliGRAM(s) IV Push every 8 hours PRN Nausea and/or Vomiting      Allergies    Advil (Unknown)  allergic to food coloring (Unknown)  Ceclor (Other)  cephalosporins (Other)  ciprofloxacin (Other)  Compazine (Other)  food coloring red/blue, wheat, tide, morphine, ceclor, compazine, advil, mortim, iv contrast, shellfish, oregano, tomoatoes, pork, peas, mold/spores (Unknown)  morphine (Unknown)  Motrin (Unknown)  Originally Entered as [Rash] reaction to [oregano spice] (Unknown)  peanuts (Other)  pork (Unknown)  shellfish (Unknown)    Intolerances        REVIEW OF SYSTEMS:  CONSTITUTIONAL: No fever or chills  HEENT:  No headache, no sore throat  RESPIRATORY: No cough, wheezing, or shortness of breath  CARDIOVASCULAR: No chest pain, palpitations  GASTROINTESTINAL: No abd pain, nausea, vomiting, or diarrhea  GENITOURINARY: No dysuria, frequency, or hematuria  NEUROLOGICAL: no focal weakness or dizziness  MUSCULOSKELETAL: no myalgias     Vital Signs Last 24 Hrs  T(C): 36.5 (14 Sep 2021 04:37), Max: 36.6 (13 Sep 2021 13:07)  T(F): 97.7 (14 Sep 2021 04:37), Max: 97.9 (13 Sep 2021 13:07)  HR: 63 (14 Sep 2021 04:37) (63 - 71)  BP: 120/69 (14 Sep 2021 04:37) (103/64 - 121/64)  BP(mean): --  RR: 20 (14 Sep 2021 04:37) (20 - 20)  SpO2: 94% (14 Sep 2021 04:37) (91% - 94%)    PHYSICAL EXAM:  GENERAL: NAD  HEENT:  anicteric, moist mucous membranes  CHEST/LUNG:  CTA b/l, no rales, wheezes, or rhonchi  HEART:  RRR, S1, S2  ABDOMEN:  BS+, soft, nontender, nondistended  EXTREMITIES: no edema, cyanosis, or calf tenderness  NERVOUS SYSTEM: answers questions and follows commands appropriately    LABS:                        12.4   15.65 )-----------( 444      ( 14 Sep 2021 08:11 )             37.8     CBC Full  -  ( 14 Sep 2021 08:11 )  WBC Count : 15.65 K/uL  Hemoglobin : 12.4 g/dL  Hematocrit : 37.8 %  Platelet Count - Automated : 444 K/uL  Mean Cell Volume : 92.4 fl  Mean Cell Hemoglobin : 30.3 pg  Mean Cell Hemoglobin Concentration : 32.8 gm/dL  Auto Neutrophil # : x  Auto Lymphocyte # : x  Auto Monocyte # : x  Auto Eosinophil # : x  Auto Basophil # : x  Auto Neutrophil % : x  Auto Lymphocyte % : x  Auto Monocyte % : x  Auto Eosinophil % : x  Auto Basophil % : x    14 Sep 2021 08:11    138    |  104    |  23     ----------------------------<  108    4.5     |  31     |  0.43     Ca    8.7        14 Sep 2021 08:11    TPro  5.7    /  Alb  2.1    /  TBili  0.6    /  DBili  x      /  AST  26     /  ALT  171    /  AlkPhos  106    14 Sep 2021 08:11    PT/INR - ( 14 Sep 2021 08:11 )   PT: 13.2 sec;   INR: 1.14 ratio             CAPILLARY BLOOD GLUCOSE              RADIOLOGY & ADDITIONAL TESTS:    Personally reviewed.     Consultant(s) Notes Reviewed:  [x] YES  [ ] NO

## 2021-09-15 ENCOUNTER — INPATIENT (INPATIENT)
Facility: HOSPITAL | Age: 81
LOS: 6 days | Discharge: HOME | DRG: 65 | End: 2021-09-22
Attending: FAMILY MEDICINE | Admitting: STUDENT IN AN ORGANIZED HEALTH CARE EDUCATION/TRAINING PROGRAM
Payer: MEDICARE

## 2021-09-15 VITALS
HEART RATE: 83 BPM | DIASTOLIC BLOOD PRESSURE: 71 MMHG | RESPIRATION RATE: 16 BRPM | HEIGHT: 62 IN | TEMPERATURE: 98 F | OXYGEN SATURATION: 97 % | SYSTOLIC BLOOD PRESSURE: 115 MMHG | WEIGHT: 145.06 LBS

## 2021-09-15 DIAGNOSIS — Z96.659 PRESENCE OF UNSPECIFIED ARTIFICIAL KNEE JOINT: Chronic | ICD-10-CM

## 2021-09-15 DIAGNOSIS — Z98.890 OTHER SPECIFIED POSTPROCEDURAL STATES: Chronic | ICD-10-CM

## 2021-09-15 DIAGNOSIS — Z95.828 PRESENCE OF OTHER VASCULAR IMPLANTS AND GRAFTS: Chronic | ICD-10-CM

## 2021-09-15 LAB
ALBUMIN SERPL ELPH-MCNC: 2.8 G/DL — LOW (ref 3.3–5)
ALP SERPL-CCNC: 134 U/L — HIGH (ref 40–120)
ALT FLD-CCNC: 169 U/L — HIGH (ref 12–78)
ANION GAP SERPL CALC-SCNC: 5 MMOL/L — SIGNIFICANT CHANGE UP (ref 5–17)
APTT BLD: 32.3 SEC — SIGNIFICANT CHANGE UP (ref 27.5–35.5)
AST SERPL-CCNC: 40 U/L — HIGH (ref 15–37)
BASOPHILS # BLD AUTO: 0.01 K/UL — SIGNIFICANT CHANGE UP (ref 0–0.2)
BASOPHILS NFR BLD AUTO: 0.1 % — SIGNIFICANT CHANGE UP (ref 0–2)
BILIRUB SERPL-MCNC: 0.7 MG/DL — SIGNIFICANT CHANGE UP (ref 0.2–1.2)
BUN SERPL-MCNC: 25 MG/DL — HIGH (ref 7–23)
CALCIUM SERPL-MCNC: 9 MG/DL — SIGNIFICANT CHANGE UP (ref 8.5–10.1)
CHLORIDE SERPL-SCNC: 100 MMOL/L — SIGNIFICANT CHANGE UP (ref 96–108)
CK SERPL-CCNC: 36 U/L — SIGNIFICANT CHANGE UP (ref 26–192)
CO2 SERPL-SCNC: 31 MMOL/L — SIGNIFICANT CHANGE UP (ref 22–31)
CREAT SERPL-MCNC: 0.64 MG/DL — SIGNIFICANT CHANGE UP (ref 0.5–1.3)
EOSINOPHIL # BLD AUTO: 0 K/UL — SIGNIFICANT CHANGE UP (ref 0–0.5)
EOSINOPHIL NFR BLD AUTO: 0 % — SIGNIFICANT CHANGE UP (ref 0–6)
GLUCOSE SERPL-MCNC: 176 MG/DL — HIGH (ref 70–99)
HCT VFR BLD CALC: 45.4 % — HIGH (ref 34.5–45)
HGB BLD-MCNC: 14.8 G/DL — SIGNIFICANT CHANGE UP (ref 11.5–15.5)
IMM GRANULOCYTES NFR BLD AUTO: 0.8 % — SIGNIFICANT CHANGE UP (ref 0–1.5)
INR BLD: 1.14 RATIO — SIGNIFICANT CHANGE UP (ref 0.88–1.16)
LYMPHOCYTES # BLD AUTO: 0.68 K/UL — LOW (ref 1–3.3)
LYMPHOCYTES # BLD AUTO: 5.7 % — LOW (ref 13–44)
MCHC RBC-ENTMCNC: 30.3 PG — SIGNIFICANT CHANGE UP (ref 27–34)
MCHC RBC-ENTMCNC: 32.6 GM/DL — SIGNIFICANT CHANGE UP (ref 32–36)
MCV RBC AUTO: 93 FL — SIGNIFICANT CHANGE UP (ref 80–100)
MONOCYTES # BLD AUTO: 0.51 K/UL — SIGNIFICANT CHANGE UP (ref 0–0.9)
MONOCYTES NFR BLD AUTO: 4.3 % — SIGNIFICANT CHANGE UP (ref 2–14)
NEUTROPHILS # BLD AUTO: 10.55 K/UL — HIGH (ref 1.8–7.4)
NEUTROPHILS NFR BLD AUTO: 89.1 % — HIGH (ref 43–77)
NRBC # BLD: 0 /100 WBCS — SIGNIFICANT CHANGE UP (ref 0–0)
PLATELET # BLD AUTO: 454 K/UL — HIGH (ref 150–400)
POTASSIUM SERPL-MCNC: 4.6 MMOL/L — SIGNIFICANT CHANGE UP (ref 3.5–5.3)
POTASSIUM SERPL-SCNC: 4.6 MMOL/L — SIGNIFICANT CHANGE UP (ref 3.5–5.3)
PROT SERPL-MCNC: 7.3 G/DL — SIGNIFICANT CHANGE UP (ref 6–8.3)
PROTHROM AB SERPL-ACNC: 13.2 SEC — SIGNIFICANT CHANGE UP (ref 10.6–13.6)
RBC # BLD: 4.88 M/UL — SIGNIFICANT CHANGE UP (ref 3.8–5.2)
RBC # FLD: 12.8 % — SIGNIFICANT CHANGE UP (ref 10.3–14.5)
SODIUM SERPL-SCNC: 136 MMOL/L — SIGNIFICANT CHANGE UP (ref 135–145)
TROPONIN I SERPL-MCNC: 0.04 NG/ML — SIGNIFICANT CHANGE UP (ref 0.01–0.04)
TSH SERPL-MCNC: 0.1 UIU/ML — LOW (ref 0.36–3.74)
WBC # BLD: 11.85 K/UL — HIGH (ref 3.8–10.5)
WBC # FLD AUTO: 11.85 K/UL — HIGH (ref 3.8–10.5)

## 2021-09-15 PROCEDURE — 73130 X-RAY EXAM OF HAND: CPT | Mod: 26,RT

## 2021-09-15 PROCEDURE — 70450 CT HEAD/BRAIN W/O DYE: CPT | Mod: 26,MA

## 2021-09-15 PROCEDURE — 99285 EMERGENCY DEPT VISIT HI MDM: CPT

## 2021-09-15 NOTE — ED ADULT NURSE NOTE - ED STAT RN HANDOFF DETAILS 2
Assumed  care of pt from previous  nurse, Raymon AQUINO in rm 14 a, awake and alert. Pt on 02 @ 2 liters via n/c. Skin warm and dry, + sensation, + capillary refill < 2 sec, IV access noted on the left a/c flushes with ease. Abd soft BS + all 4 quads nontender. Pt has right upper arm weakness and right leg weakness. call bell within reach.

## 2021-09-15 NOTE — ED ADULT NURSE NOTE - CHPI ED NUR SYMPTOMS NEG
no blurred vision/no change in level of consciousness/no dizziness/no fever/no nausea/no numbness/no vomiting

## 2021-09-15 NOTE — ED PROVIDER NOTE - NSICDXPASTSURGICALHX_GEN_ALL_CORE_FT
PAST SURGICAL HISTORY:  [place] filter in place 2009    H/O sinus surgery 2/2017    H/O Spinal surgery 2009    S/P Appendectomy 1968    S/P Cholecystectomy 1968    S/P Hysterectomy 1987    S/P TKR (Total Knee Replacement) Right 2015    S/P TKR (total knee replacement) Left 2009

## 2021-09-15 NOTE — ED ADULT NURSE NOTE - NSICDXPASTSURGICALHX_GEN_ALL_CORE_FT
PAST SURGICAL HISTORY:  [place] filter in place 2009    H/O sinus surgery 2/2017    H/O Spinal surgery 2009    S/P Appendectomy 1968    S/P Cholecystectomy 1968    S/P Hysterectomy 1987    S/P TKR (Total Knee Replacement) Right 2015    S/P TKR (total knee replacement) Left 2009

## 2021-09-15 NOTE — ED ADULT NURSE NOTE - OBJECTIVE STATEMENT
Patient alert and oriented X 2. Complaining of right arm weakness X 1 week. Patient alert and oriented X 2. Complaining of right arm weakness and pain X 1 week. Patient discharged from hospital yesterday. Diagnosed with covid 19 9/7/21. Patient state shes fell today. Unable to recall events of the fall. Blood noted under right thumb nail.  Denies chest pain, shortness of breath, nausea, vomiting, headache, dizziness and fever. Lungs clears bilaterally. Respirations even and not labored. Abdomen soft non tender. Patient admits to using 2 liters nasal cannula at home.

## 2021-09-15 NOTE — ED ADULT NURSE NOTE - NSIMPLEMENTINTERV_GEN_ALL_ED
Spoke with patient and informed her of results.  Patient is requesting to speak with triage nurse Sandra.  Patient declined talking to anyone other than Sandra.   Implemented All Fall Risk Interventions:  [place] to call system. Call bell, personal items and telephone within reach. Instruct patient to call for assistance. Room bathroom lighting operational. Non-slip footwear when patient is off stretcher. Physically safe environment: no spills, clutter or unnecessary equipment. Stretcher in lowest position, wheels locked, appropriate side rails in place. Provide visual cue, wrist band, yellow gown, etc. Monitor gait and stability. Monitor for mental status changes and reorient to person, place, and time. Review medications for side effects contributing to fall risk. Reinforce activity limits and safety measures with patient and family.

## 2021-09-15 NOTE — ED PROVIDER NOTE - NSICDXFAMILYHX_GEN_ALL_CORE_FT
FAMILY HISTORY:  Father  Still living? Unknown  FHx: myocardial infarction, Age at diagnosis: Age Unknown

## 2021-09-15 NOTE — ED PROVIDER NOTE - PHYSICAL EXAMINATION
Vital signs as available reviewed.  General:  Comfortable, no acute distress.  Head:  Normocephalic, atraumatic.  Eyes:  Conjunctiva pink, no icterus.  Cardiovascular:  Regular rate, no obvious murmur.  Respiratory:  coarse bilaterally.  Abdomen:  Soft, non-tender.  Musculoskeletal:  No deformity or calf tenderness.  hematoma over right thumb.  Neurologic: Alert and oriented, to self and month only. right arm with weakness, no effort against gravity. right leg with some effort against gravity.  Skin:  Warm and dry.

## 2021-09-15 NOTE — ED PROVIDER NOTE - EKG #1 DATE/TIME
Pt requesting orders for annual labs be added to the chart. Please call back/send message on [place] with confirmation once added. 16-Sep-2021 00:23

## 2021-09-15 NOTE — ED PROVIDER NOTE - OBJECTIVE STATEMENT
81 F history of IBS and patient was admitted to [facility] for a week and a half for covid pneumonia. patient was discharged yesterday. patient has been weak. patient has not been moving for a week- she can just move her fingers. patient can't be taken care of at home. she fell on to couch cushions.

## 2021-09-15 NOTE — ED PROVIDER NOTE - CLINICAL SUMMARY MEDICAL DECISION MAKING FREE TEXT BOX
Here for weakness and several weeks of worsening weakness, most in rue, but ongoing x weeks per son. check labs, UA, admit.
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
## 2021-09-16 DIAGNOSIS — I63.9 CEREBRAL INFARCTION, UNSPECIFIED: ICD-10-CM

## 2021-09-16 DIAGNOSIS — R29.898 OTHER SYMPTOMS AND SIGNS INVOLVING THE MUSCULOSKELETAL SYSTEM: ICD-10-CM

## 2021-09-16 DIAGNOSIS — E03.9 HYPOTHYROIDISM, UNSPECIFIED: ICD-10-CM

## 2021-09-16 DIAGNOSIS — Z29.9 ENCOUNTER FOR PROPHYLACTIC MEASURES, UNSPECIFIED: ICD-10-CM

## 2021-09-16 DIAGNOSIS — R53.1 WEAKNESS: ICD-10-CM

## 2021-09-16 DIAGNOSIS — K59.00 CONSTIPATION, UNSPECIFIED: ICD-10-CM

## 2021-09-16 DIAGNOSIS — U07.1 COVID-19: ICD-10-CM

## 2021-09-16 DIAGNOSIS — K58.9 IRRITABLE BOWEL SYNDROME WITHOUT DIARRHEA: ICD-10-CM

## 2021-09-16 LAB
A1C WITH ESTIMATED AVERAGE GLUCOSE RESULT: 6.3 % — HIGH (ref 4–5.6)
ALBUMIN SERPL ELPH-MCNC: 2.2 G/DL — LOW (ref 3.3–5)
ALP SERPL-CCNC: 107 U/L — SIGNIFICANT CHANGE UP (ref 40–120)
ALT FLD-CCNC: 125 U/L — HIGH (ref 12–78)
ANION GAP SERPL CALC-SCNC: 5 MMOL/L — SIGNIFICANT CHANGE UP (ref 5–17)
APPEARANCE UR: ABNORMAL
AST SERPL-CCNC: 25 U/L — SIGNIFICANT CHANGE UP (ref 15–37)
BACTERIA # UR AUTO: ABNORMAL
BASOPHILS # BLD AUTO: 0.01 K/UL — SIGNIFICANT CHANGE UP (ref 0–0.2)
BASOPHILS NFR BLD AUTO: 0.1 % — SIGNIFICANT CHANGE UP (ref 0–2)
BILIRUB SERPL-MCNC: 0.6 MG/DL — SIGNIFICANT CHANGE UP (ref 0.2–1.2)
BILIRUB UR-MCNC: NEGATIVE — SIGNIFICANT CHANGE UP
BUN SERPL-MCNC: 25 MG/DL — HIGH (ref 7–23)
CALCIUM SERPL-MCNC: 8.4 MG/DL — LOW (ref 8.5–10.1)
CHLORIDE SERPL-SCNC: 104 MMOL/L — SIGNIFICANT CHANGE UP (ref 96–108)
CHOLEST SERPL-MCNC: 137 MG/DL — SIGNIFICANT CHANGE UP
CO2 SERPL-SCNC: 28 MMOL/L — SIGNIFICANT CHANGE UP (ref 22–31)
COLOR SPEC: YELLOW — SIGNIFICANT CHANGE UP
CREAT SERPL-MCNC: 0.44 MG/DL — LOW (ref 0.5–1.3)
DIFF PNL FLD: ABNORMAL
EOSINOPHIL # BLD AUTO: 0.01 K/UL — SIGNIFICANT CHANGE UP (ref 0–0.5)
EOSINOPHIL NFR BLD AUTO: 0.1 % — SIGNIFICANT CHANGE UP (ref 0–6)
EPI CELLS # UR: SIGNIFICANT CHANGE UP
ESTIMATED AVERAGE GLUCOSE: 134 MG/DL — HIGH (ref 68–114)
FOLATE SERPL-MCNC: 15.5 NG/ML — SIGNIFICANT CHANGE UP
GLUCOSE SERPL-MCNC: 94 MG/DL — SIGNIFICANT CHANGE UP (ref 70–99)
GLUCOSE UR QL: NEGATIVE — SIGNIFICANT CHANGE UP
HCT VFR BLD CALC: 37.1 % — SIGNIFICANT CHANGE UP (ref 34.5–45)
HDLC SERPL-MCNC: 27 MG/DL — LOW
HGB BLD-MCNC: 12.4 G/DL — SIGNIFICANT CHANGE UP (ref 11.5–15.5)
IMM GRANULOCYTES NFR BLD AUTO: 0.8 % — SIGNIFICANT CHANGE UP (ref 0–1.5)
KETONES UR-MCNC: NEGATIVE — SIGNIFICANT CHANGE UP
LEUKOCYTE ESTERASE UR-ACNC: ABNORMAL
LIPID PNL WITH DIRECT LDL SERPL: 74 MG/DL — SIGNIFICANT CHANGE UP
LYMPHOCYTES # BLD AUTO: 1.34 K/UL — SIGNIFICANT CHANGE UP (ref 1–3.3)
LYMPHOCYTES # BLD AUTO: 13 % — SIGNIFICANT CHANGE UP (ref 13–44)
MAGNESIUM SERPL-MCNC: 2 MG/DL — SIGNIFICANT CHANGE UP (ref 1.6–2.6)
MCHC RBC-ENTMCNC: 30.6 PG — SIGNIFICANT CHANGE UP (ref 27–34)
MCHC RBC-ENTMCNC: 33.4 GM/DL — SIGNIFICANT CHANGE UP (ref 32–36)
MCV RBC AUTO: 91.6 FL — SIGNIFICANT CHANGE UP (ref 80–100)
MONOCYTES # BLD AUTO: 0.99 K/UL — HIGH (ref 0–0.9)
MONOCYTES NFR BLD AUTO: 9.6 % — SIGNIFICANT CHANGE UP (ref 2–14)
NEUTROPHILS # BLD AUTO: 7.85 K/UL — HIGH (ref 1.8–7.4)
NEUTROPHILS NFR BLD AUTO: 76.4 % — SIGNIFICANT CHANGE UP (ref 43–77)
NITRITE UR-MCNC: NEGATIVE — SIGNIFICANT CHANGE UP
NON HDL CHOLESTEROL: 110 MG/DL — SIGNIFICANT CHANGE UP
NRBC # BLD: 0 /100 WBCS — SIGNIFICANT CHANGE UP (ref 0–0)
PH UR: 7 — SIGNIFICANT CHANGE UP (ref 5–8)
PLATELET # BLD AUTO: 362 K/UL — SIGNIFICANT CHANGE UP (ref 150–400)
POTASSIUM SERPL-MCNC: 4.2 MMOL/L — SIGNIFICANT CHANGE UP (ref 3.5–5.3)
POTASSIUM SERPL-SCNC: 4.2 MMOL/L — SIGNIFICANT CHANGE UP (ref 3.5–5.3)
PROT SERPL-MCNC: 6 G/DL — SIGNIFICANT CHANGE UP (ref 6–8.3)
PROT UR-MCNC: 100
RBC # BLD: 4.05 M/UL — SIGNIFICANT CHANGE UP (ref 3.8–5.2)
RBC # FLD: 12.6 % — SIGNIFICANT CHANGE UP (ref 10.3–14.5)
RBC CASTS # UR COMP ASSIST: SIGNIFICANT CHANGE UP /HPF (ref 0–4)
SARS-COV-2 RNA SPEC QL NAA+PROBE: SIGNIFICANT CHANGE UP
SODIUM SERPL-SCNC: 137 MMOL/L — SIGNIFICANT CHANGE UP (ref 135–145)
SP GR SPEC: 1.01 — SIGNIFICANT CHANGE UP (ref 1.01–1.02)
T3 SERPL-MCNC: 59 NG/DL — LOW (ref 80–200)
TRIGL SERPL-MCNC: 179 MG/DL — HIGH
TSH SERPL-MCNC: 0.1 UIU/ML — LOW (ref 0.36–3.74)
UROBILINOGEN FLD QL: NEGATIVE — SIGNIFICANT CHANGE UP
VIT B12 SERPL-MCNC: 1259 PG/ML — HIGH (ref 232–1245)
WBC # BLD: 10.28 K/UL — SIGNIFICANT CHANGE UP (ref 3.8–10.5)
WBC # FLD AUTO: 10.28 K/UL — SIGNIFICANT CHANGE UP (ref 3.8–10.5)
WBC UR QL: ABNORMAL

## 2021-09-16 PROCEDURE — 93880 EXTRACRANIAL BILAT STUDY: CPT | Mod: 26

## 2021-09-16 PROCEDURE — 99223 1ST HOSP IP/OBS HIGH 75: CPT | Mod: GC

## 2021-09-16 PROCEDURE — 71045 X-RAY EXAM CHEST 1 VIEW: CPT | Mod: 26

## 2021-09-16 PROCEDURE — 93306 TTE W/DOPPLER COMPLETE: CPT | Mod: 26

## 2021-09-16 RX ORDER — ENOXAPARIN SODIUM 100 MG/ML
40 INJECTION SUBCUTANEOUS DAILY
Refills: 0 | Status: DISCONTINUED | OUTPATIENT
Start: 2021-09-16 | End: 2021-09-22

## 2021-09-16 RX ORDER — ACETAMINOPHEN 500 MG
650 TABLET ORAL EVERY 6 HOURS
Refills: 0 | Status: DISCONTINUED | OUTPATIENT
Start: 2021-09-16 | End: 2021-09-22

## 2021-09-16 RX ORDER — ONDANSETRON 8 MG/1
4 TABLET, FILM COATED ORAL EVERY 8 HOURS
Refills: 0 | Status: DISCONTINUED | OUTPATIENT
Start: 2021-09-16 | End: 2021-09-22

## 2021-09-16 RX ORDER — ASPIRIN/CALCIUM CARB/MAGNESIUM 324 MG
325 TABLET ORAL ONCE
Refills: 0 | Status: COMPLETED | OUTPATIENT
Start: 2021-09-16 | End: 2021-09-16

## 2021-09-16 RX ORDER — DEXAMETHASONE 0.5 MG/5ML
6 ELIXIR ORAL ONCE
Refills: 0 | Status: DISCONTINUED | OUTPATIENT
Start: 2021-09-16 | End: 2021-09-16

## 2021-09-16 RX ORDER — LEVOTHYROXINE SODIUM 125 MCG
100 TABLET ORAL DAILY
Refills: 0 | Status: DISCONTINUED | OUTPATIENT
Start: 2021-09-16 | End: 2021-09-22

## 2021-09-16 RX ORDER — PANTOPRAZOLE SODIUM 20 MG/1
40 TABLET, DELAYED RELEASE ORAL
Refills: 0 | Status: DISCONTINUED | OUTPATIENT
Start: 2021-09-16 | End: 2021-09-22

## 2021-09-16 RX ORDER — LACTULOSE 10 G/15ML
20 SOLUTION ORAL THREE TIMES A DAY
Refills: 0 | Status: DISCONTINUED | OUTPATIENT
Start: 2021-09-16 | End: 2021-09-22

## 2021-09-16 RX ORDER — ATORVASTATIN CALCIUM 80 MG/1
80 TABLET, FILM COATED ORAL AT BEDTIME
Refills: 0 | Status: DISCONTINUED | OUTPATIENT
Start: 2021-09-16 | End: 2021-09-22

## 2021-09-16 RX ORDER — ASPIRIN/CALCIUM CARB/MAGNESIUM 324 MG
81 TABLET ORAL DAILY
Refills: 0 | Status: DISCONTINUED | OUTPATIENT
Start: 2021-09-16 | End: 2021-09-16

## 2021-09-16 RX ORDER — LINACLOTIDE 145 UG/1
290 CAPSULE, GELATIN COATED ORAL
Refills: 0 | Status: DISCONTINUED | OUTPATIENT
Start: 2021-09-16 | End: 2021-09-22

## 2021-09-16 RX ORDER — MELOXICAM 15 MG/1
1 TABLET ORAL
Qty: 0 | Refills: 0 | DISCHARGE

## 2021-09-16 RX ORDER — ASPIRIN/CALCIUM CARB/MAGNESIUM 324 MG
325 TABLET ORAL DAILY
Refills: 0 | Status: DISCONTINUED | OUTPATIENT
Start: 2021-09-16 | End: 2021-09-22

## 2021-09-16 RX ORDER — DEXAMETHASONE 0.5 MG/5ML
6 ELIXIR ORAL DAILY
Refills: 0 | Status: COMPLETED | OUTPATIENT
Start: 2021-09-16 | End: 2021-09-18

## 2021-09-16 RX ADMIN — ENOXAPARIN SODIUM 40 MILLIGRAM(S): 100 INJECTION SUBCUTANEOUS at 13:33

## 2021-09-16 RX ADMIN — Medication 100 MICROGRAM(S): at 05:45

## 2021-09-16 RX ADMIN — LACTULOSE 20 GRAM(S): 10 SOLUTION ORAL at 05:45

## 2021-09-16 RX ADMIN — LACTULOSE 20 GRAM(S): 10 SOLUTION ORAL at 22:42

## 2021-09-16 RX ADMIN — Medication 6 MILLIGRAM(S): at 05:45

## 2021-09-16 RX ADMIN — ATORVASTATIN CALCIUM 80 MILLIGRAM(S): 80 TABLET, FILM COATED ORAL at 22:42

## 2021-09-16 RX ADMIN — Medication 325 MILLIGRAM(S): at 13:33

## 2021-09-16 RX ADMIN — PANTOPRAZOLE SODIUM 40 MILLIGRAM(S): 20 TABLET, DELAYED RELEASE ORAL at 06:03

## 2021-09-16 RX ADMIN — LACTULOSE 20 GRAM(S): 10 SOLUTION ORAL at 19:06

## 2021-09-16 RX ADMIN — Medication 325 MILLIGRAM(S): at 02:20

## 2021-09-16 NOTE — H&P ADULT - PROBLEM SELECTOR PLAN 4
Chronic, stable  - Continue home medication Linzess 290 mcg PO qd  - Monitor for bowel movements Chronic  - Continue Lactulose 20 mg PO TID, hold for diarrhea  - Continue home medication Linzess 290 mcg PO qd  - Monitor for bowel movements

## 2021-09-16 NOTE — H&P ADULT - PROBLEM SELECTOR PLAN 7
DVT ppx: Lovenox 40 mg subq qd    IMPROVE VTE Individual Risk Assessment          RISK                                                          Points  [  ] Previous VTE                                                3  [  ] Thrombophilia                                             2  [  ] Lower limb paralysis                                   2        (unable to hold up >15 seconds)    [  ] Current Cancer                                             2         (within 6 months)  [  ] Immobilization > 24 hrs                              1  [  ] ICU/CCU stay > 24 hours                             1  [ x ] Age > 60                                                         1    IMPROVE VTE Score: 1

## 2021-09-16 NOTE — H&P ADULT - ATTENDING COMMENTS
82 yo F PMHx hypothyroidism, irritable bowel syndrome, COVID-19 PNA presenting from home with weakness and fall admitted for acute weakness in RUE and change in mental status, r/o CVA.    admit to medicine w/ tele monitoring  change in mental status & acute weakness in RUE, along generalized weakness  will work up for CVA - CT head negative. check US carotids, TTE, MRI/A brain.  check A1C, lipid profile, b12, folate, TFTs (TSH low)  start ASA and statin  neurology consultation  Physical therapy evaluation  d/w patient at bedside (son was on speakerphone)  Dr. Malhotra called and spoke to son for additional history & updates  patient is DNR/DNI - son to bring in paperwork    Agree with H&P as outlined above, edited where appropriate. 82 yo F PMHx hypothyroidism, irritable bowel syndrome, COVID-19 PNA presenting from home with weakness and fall admitted for acute weakness in RUE and change in mental status, r/o CVA.    admit to medicine w/ tele monitoring  change in mental status & acute weakness in RUE, along with generalized weakness  will work up for CVA - CT head negative. check US carotids, TTE, MRI/A brain.  check A1C, lipid profile, b12, folate, TFTs (TSH low)  start ASA and statin  neurology consultation  Physical therapy evaluation  d/w patient at bedside (son was on speakerphone)  Dr. Malhotra called and spoke to son for additional history & updates  patient is DNR/DNI - son to bring in paperwork    Agree with H&P as outlined above, edited where appropriate. 80 yo F PMHx hypothyroidism, irritable bowel syndrome, COVID-19 PNA presenting from home with weakness and fall admitted for acute weakness in RUE and change in mental status, r/o CVA.    admit to medicine w/ tele monitoring  change in mental status & acute weakness in RUE, along with generalized weakness  will work up for CVA - CT head negative. check US carotids, TTE  patient states that she was told she cannot have MRIs due to recent spinal surgery  check A1C, lipid profile, b12, folate, TFTs (TSH low)  start ASA and statin  neurology consultation  Physical therapy evaluation  d/w patient at bedside (son was on speakerphone)  Dr. Malhotra called and spoke to son for additional history & updates  patient is DNR/DNI - son to bring in paperwork    Agree with H&P as outlined above, edited where appropriate.

## 2021-09-16 NOTE — H&P ADULT - PROBLEM SELECTOR PLAN 2
Pt admitted to [facility] from 9/7-13 for COVID pneumonia.  - Pt s/p Remdesivir   - Satting  - Continue Decadron for total 10 days, last day 9/18 complains of pain/discomfort Pt admitted to [facility] from 9/7-13 for COVID pneumonia.  - Pt s/p Remdesivir and course of meropenem  - Continue Decadron for total 10 days, last day 9/18  - O2 support as needed Patient presents with weakness and s/p fall onto couch  - CT head: Fluid levels in the left frontal sinus and right maxillary sinus due to acute sinusitis in the appropriate clinical setting. Chronic sphenoid sinusitis. No acute intracranial bleeding. Mild chronic microvascular ischemic changes.  - Fall precautions, bed alarm, chair alarm  - Check orthostatics  - PT eval  - Social work eval Patient presents with weakness and s/p fall onto couch  - CT head: Fluid levels in the left frontal sinus and right maxillary sinus due to acute sinusitis in the appropriate clinical setting. Chronic sphenoid sinusitis. No acute intracranial bleeding. Mild chronic microvascular ischemic changes.  - Fall precautions  - PT eval

## 2021-09-16 NOTE — H&P ADULT - ASSESSMENT
82 yo F PMHx hypothyroidism, irritable bowel Syndrome presenting from home with weakness admitted for weakness 80 yo F PMHx hypothyroidism, irritable bowel Syndrome presenting from home with generalized weakness with fall onto couch, unable to be taken for at home admitted for weakness. 82 yo F PMHx hypothyroidism, irritable bowel Syndrome presenting from home with generalized weakness with fall onto couch, unable to be taken for at home admitted for fall and weakness.  80 yo F PMHx hypothyroidism, irritable bowel Syndrome presenting from home with generalized weakness with fall onto couch, unable to be taken for at home admitted for fall and acute weakness in RUE likely 2/2 TIA vs. r/o CVA.   82 yo F PMHx hypothyroidism, irritable bowel syndrome, COVID-19 PNA presenting from home with weakness and fall admitted for acute weakness in RUE and change in mental status, r/o CVA.

## 2021-09-16 NOTE — PHYSICAL THERAPY INITIAL EVALUATION ADULT - ASSISTIVE DEVICE:SIT/SUPINE, REHAB EVAL
LOV: 7/2/20  NOV: 7/8/21    Guillermo EDGAR Dawn contacted clinic with C/O of a sore throat that started last week, a dry hacky cough that started this week, and a blood shot eye that she first noticed this AM. Patient reports that her right eye is draining a bunch of \"gunk.\"    Unable to schedule patient for office visit d/t COVID symptoms - encouraged patient to seek UC evaluation. Patient agreeable. Expressed thanks and no further questions or concerns at this time.     bed rails

## 2021-09-16 NOTE — H&P ADULT - PROBLEM SELECTOR PLAN 3
Chronic  - Continue Lactulose 20 mg PO TID, hold for diarrhea  - Continue home medication Linzess 290 mcg PO qd  - Monitor for bowel movements Pt admitted to [facility] from 9/7-13 for COVID pneumonia.  - Pt s/p Remdesivir and course of meropenem  - Continue Decadron for total 10 days, last day 9/18  - O2 support as needed Pt admitted to [facility] from 9/7-13 for COVID pneumonia.  - repeat COVID-19 PCR negative  - Pt s/p Remdesivir and course of meropenem  - Continue course of Decadron for total 10 days, last day 9/18  - O2 support as needed

## 2021-09-16 NOTE — OCCUPATIONAL THERAPY INITIAL EVALUATION ADULT - FINE MOTOR COORDINATION TRAINING, OT EVAL
Patient will improve fine motor skills right UE to enable patient to assist with ADL's in 3-5 sessions.

## 2021-09-16 NOTE — OCCUPATIONAL THERAPY INITIAL EVALUATION ADULT - RANGE OF MOTION EXAMINATION, UPPER EXTREMITY
AROM L elbow/forearm/wrist/digits: WFL's. R elbow/forearm/wrist/gross flexion/extension: WFL's. Fine motor skills: WFL's left hand, mod/severely impaired right hand. Pt unable to oppose R digit #'s 4 & 5./bilateral UE Passive ROM was WFL  (within functional limits)

## 2021-09-16 NOTE — H&P ADULT - NSHPSOCIALHISTORY_GEN_ALL_CORE
Denies use of etoh, tobacco and drug use. Denies use of etoh, tobacco and drug use.  Unvaccinated for COVID  Lives with son Denies use of etoh, tobacco and drug use.  Unvaccinated for COVID  Lives with son  Occupation:

## 2021-09-16 NOTE — H&P ADULT - HISTORY OF PRESENT ILLNESS
80 yo F PMHx hypothyroidism, irritable bowel Syndrome presenting from home with weakness. Of note, patient admitted to [facility] from 9/7-13 for COVID pneumonia. Patient was discharged to home.     ED course:  Vitals: T 98 HR 83 /71 RR 16 SpO2 97% on RA  Labs: WBC 11.85, Plt 454, Neut 10.55 (89.1%), Lymph 0.68, Glu 176, Alb 2.8, Alk phos 134, AST 40, , TSH 0.10  CT head: Fluid levels in the left frontal sinus and right maxillary sinus due to acute sinusitis in the appropriate clinical setting. Chronic sphenoid sinusitis. No acute intracranial bleeding. Mild chronic microvascular ischemic changes.  EKG:    80 yo F PMHx hypothyroidism, irritable bowel Syndrome presenting from home with weakness. Of note, patient admitted to [facility] from 9/7-13 for COVID pneumonia. Patient was discharged to home.     ED course:  Vitals: T 98 HR 83 /71 RR 16 SpO2 97% on RA  Labs: WBC 11.85, Plt 454, Neut 10.55 (89.1%), Lymph 0.68, Glu 176, Alb 2.8, Alk phos 134, AST 40, , TSH 0.10  CT head: Fluid levels in the left frontal sinus and right maxillary sinus due to acute sinusitis in the appropriate clinical setting. Chronic sphenoid sinusitis. No acute intracranial bleeding. Mild chronic microvascular ischemic changes.  EKG:  82 yo F PMHx hypothyroidism, irritable bowel Syndrome presenting from home with weakness. Of note, patient admitted to [facility] from 9/7-13 for COVID pneumonia. Patient was discharged to home. Patient is a limited historian, responding "I'm confused" to most questions. She lives at home with her son. Patient states she noticed weakness in her right arm, endorsing that she feels loss of strength in her right arm and both legs. She denies loss of sensation. Patient states she was standing in the living room when she fell onto the couch because she felt weak. Denies trauma or injury. Admits weakness, loss of strength in RUE and bilateral LE. Denies headache, dizziness, paresthesias, numbness/tingling.     ED course:  Vitals: T 98 HR 83 /71 RR 16 SpO2 97% on RA  Labs: WBC 11.85, Plt 454, Neut 10.55 (89.1%), Lymph 0.68, Glu 176, Alb 2.8, Alk phos 134, AST 40, , TSH 0.10  CT head: Fluid levels in the left frontal sinus and right maxillary sinus due to acute sinusitis in the appropriate clinical setting. Chronic sphenoid sinusitis. No acute intracranial bleeding. Mild chronic microvascular ischemic changes.  EKG: NSR @69 bpm, LAD, incomplete RBBB 80 yo F PMHx hypothyroidism, irritable bowel syndrome presenting from home with weakness. Of note, patient admitted to [facility] from 9/7-13 for COVID pneumonia. Patient was discharged home. Patient is a limited historian, responding "I'm confused" to most questions. Further history obtained from patient's son, Adriel. She lives at home with her son. Patient's son, Adriel, states patient is a  and works full time. Patient is independent and does not use any assistive devices. Patient's son states she has had difficulty walking up the stairs in the house and requires assistance from son to walk around the house. Patient's son states he noticed weakness in her right arm, endorsing that patient developed loss of strength in her right arm last night. Patient denies loss of sensation. Patient states she was standing in the living room when she fell onto the couch because she felt weak. Denies trauma or injury. Admits weakness, loss of strength in RUE. Denies chest pain, shortness of breath, headache, dizziness, paresthesias, numbness/tingling.     ED course:  Vitals: T 98 HR 83 /71 RR 16 SpO2 97% on RA  Labs: WBC 11.85, Plt 454, Neut 10.55 (89.1%), Lymph 0.68, Glu 176, Alb 2.8, Alk phos 134, AST 40, , TSH 0.10  CT head: Fluid levels in the left frontal sinus and right maxillary sinus due to acute sinusitis in the appropriate clinical setting. Chronic sphenoid sinusitis. No acute intracranial bleeding. Mild chronic microvascular ischemic changes.  EKG: NSR @69 bpm, LAD, incomplete RBBB 82 yo F PMHx hypothyroidism, irritable bowel syndrome presenting from home with weakness. Of note, patient admitted to [facility] from 9/7-13 for COVID pneumonia. Patient was discharged home. Patient is a limited historian, responding "I'm confused" to most questions. Further history obtained from patient's son, Adriel. She lives at home with her son. Patient's son, Adriel, states patient is a  and works full time. Patient is independent and does not use any assistive devices. Patient's son states she has had difficulty walking up the stairs in the house and requires assistance from son to walk around the house. Patient's son states he noticed weakness in her right arm, endorsing that patient developed loss of strength in her right arm last night. Patient denies loss of sensation. Patient states she was standing in the living room when she fell onto the couch because she felt weak. Denies trauma or injury. Admits weakness, loss of strength in RUE. She states that her right hand weakness has been ongoing from one day prior to arrival. Denies chest pain, shortness of breath, headache, dizziness, paresthesias, numbness/tingling. Of note, son states that they never picked up the xarelto. They were unaware it was sent to the pharmacy to start as a home medication and states that the pharmacy only had decadron waiting for them.    ED course:  Vitals: T 98 HR 83 /71 RR 16 SpO2 97% on RA  Labs: WBC 11.85, Plt 454, Neut 10.55 (89.1%), Lymph 0.68, Glu 176, Alb 2.8, Alk phos 134, AST 40, , TSH 0.10  CT head: Fluid levels in the left frontal sinus and right maxillary sinus due to acute sinusitis in the appropriate clinical setting. Chronic sphenoid sinusitis. No acute intracranial bleeding. Mild chronic microvascular ischemic changes.  CXR: bilateral infiltrates  EKG: NSR @69 bpm, LAD, incomplete RBBB

## 2021-09-16 NOTE — H&P ADULT - PROBLEM SELECTOR PLAN 6
DVT ppx: Lovenox 40 mg subq qd    IMPROVE VTE Individual Risk Assessment          RISK                                                          Points  [  ] Previous VTE                                                3  [  ] Thrombophilia                                             2  [  ] Lower limb paralysis                                   2        (unable to hold up >15 seconds)    [  ] Current Cancer                                             2         (within 6 months)  [  ] Immobilization > 24 hrs                              1  [  ] ICU/CCU stay > 24 hours                             1  [ x ] Age > 60                                                         1    IMPROVE VTE Score: 1 Chronic  - Continue home Levothyroxine 100 mg qd

## 2021-09-16 NOTE — OCCUPATIONAL THERAPY INITIAL EVALUATION ADULT - TRANSFER SAFETY CONCERNS NOTED: SIT/STAND, REHAB EVAL
decreased weight-shifting ability decreased safety awareness/losing balance/decreased weight-shifting ability

## 2021-09-16 NOTE — OCCUPATIONAL THERAPY INITIAL EVALUATION ADULT - ADDITIONAL COMMENTS
Patient lives in a house with 6 steps with handrail to enter, 13 steps with handrail to bedroom. Patient has a stall shower with grab bars. Patient owns a rollator. Patient is right hand dominant. Patient reports that she has a rotator cuff problem left shd. Right scapula elevation/retraction: minimal, no AROM noted R shd or left shd. Decreased muscle tone noted right UE. Patient requires assistance with ADL's and transfers due to impaired safety awareness, decreased AROM bilateral shoulders, decreased strength, impaired coordination RUE, decreased endurance and impaired sitting/standing balance.

## 2021-09-16 NOTE — H&P ADULT - NSHPREVIEWOFSYSTEMS_GEN_ALL_CORE
Constitutional: denies fever, chills, diaphoresis   HEENT: denies blurry vision, double vision, eye pain, difficulty hearing  Respiratory: denies SOB, cough, sputum production  Cardiovascular: denies CP, palpitations, edema  Gastrointestinal: denies nausea, vomiting, diarrhea, constipation, abdominal pain  Genitourinary: denies dysuria, frequency, urgency, hematuria   Skin/Breast: denies rash, itching  Neurologic: denies headache, weakness, dizziness, paresthesias, numbness/tingling  Psychiatric: denies anxiety, depression, suicidal, homicidal thoughts  ROS negative except as noted above Constitutional: denies fever, chills, diaphoresis   HEENT: denies blurry vision, double vision, eye pain, difficulty hearing  Respiratory: denies SOB, cough, sputum production  Cardiovascular: denies CP, palpitations, edema  Gastrointestinal: denies nausea, vomiting, diarrhea, constipation, abdominal pain  Genitourinary: denies dysuria, frequency, urgency, hematuria   Skin/Breast: denies rash, itching  Neurologic: denies headache, weakness, dizziness, paresthesias, numbness/tingling  Psychiatric: denies anxiety, depression, suicidal, homicidal thoughts Constitutional: denies fever, chills, diaphoresis   HEENT: denies blurry vision, double vision, eye pain, difficulty hearing  Respiratory: denies SOB, cough, sputum production  Cardiovascular: denies CP, palpitations, edema  Gastrointestinal: denies nausea, vomiting, diarrhea, constipation, abdominal pain  Genitourinary: denies dysuria, frequency, urgency, hematuria   Skin/Breast: denies rash, itching  Neurologic: admits weakness, loss of strength in RUE and bilateral LE, denies headache, dizziness, paresthesias, numbness/tingling  Psychiatric: denies anxiety, depression, suicidal, homicidal thoughts

## 2021-09-16 NOTE — SWALLOW BEDSIDE ASSESSMENT ADULT - COMMENTS
Consult received and chart reviewed. Patient seen at bedside this AM for initial assessment of swallow function. Consult received and chart reviewed. Patient seen at bedside this AM for initial assessment of swallow function. Patient was alert and cooperative. Patient on supplemental O2 via NC, 2L. Patient demonstrates ability to follow simple commands. Vocal quality and speech production WFL. Patient reported she tolerates a regular diet and thin liquids without difficulty, despite some missing lower dentition. She reported she does not eat meat and follows a vegetarian diet.     Per charting, Patient is a "82 yo F PMHx hypothyroidism, irritable bowel syndrome presenting from home with weakness." WBC WFL. CXR results pending. CT Head 9/15/21 revealed "Fluid levels in the left frontal sinus and right maxillary sinus due to acute sinusitis in the appropriate clinical setting. Chronic sphenoid sinusitis. No acute intracranial bleeding. Mild chronic microvascular ischemic changes." MR Head pending.    Discussed results and recommendations with Patient, RN, and call out to MD.

## 2021-09-16 NOTE — OCCUPATIONAL THERAPY INITIAL EVALUATION ADULT - GENERAL OBSERVATIONS, REHAB EVAL
Patient found supine in bed with +IV lock, +telemonitor and supplemental 02. Patient found supine in bed with +IV lock, +telemonitor and supplemental 02. Patient appears mildly confused and exhibits decreased insight re: medical and functional status.

## 2021-09-16 NOTE — CONSULT NOTE ADULT - SUBJECTIVE AND OBJECTIVE BOX
Clinical impression  TIA rule out CVA vs H/O covid deconditioning   telemetry evaluation to rule out underlying arrhythmia.   echocardiogram to evaluate ejection fraction.  MRI MRA  brain.  I would recommend to check TSH,lipid panel  and A1C  Would recommend to avoid half normal and D5 fluids, would recommend if necessary use only normal saline.  Would recommend aspirin 325 once a day for two weeks and can cut down to 81 as per son has taken aspirin with no problems   I will recommend cholesterol medications.  I will get Physical Therapy and Occupational Therapy.  echo

## 2021-09-16 NOTE — OCCUPATIONAL THERAPY INITIAL EVALUATION ADULT - PERTINENT HX OF CURRENT PROBLEM, REHAB EVAL
82 yo female presented from home with weakness and difficulty ambulating s/p fall at home. 80 yo female presented from home with weakness and difficulty ambulating s/p fall at home. Patient was at [facility] 9/7-9/13/21 with COVID pneumonia. Patient admitted 9/16/2021 with changes in mental status and RUE weakness. CT head: Fluid levels in the left frontal sinus and right maxillary sinus due to acute sinusitis in the appropriate clinical setting. Chronic sphenoid sinusitis. No acute intracranial bleeding. Mild chronic microvascular ischemic changes.

## 2021-09-16 NOTE — SWALLOW BEDSIDE ASSESSMENT ADULT - ASR SWALLOW RECOMMEND DIAG
Objective swallow study is not warranted at this time d/t Patient is currently tolerating baseline diet level

## 2021-09-16 NOTE — H&P ADULT - PROBLEM SELECTOR PLAN 5
Chronic  - Continue home Levothyroxine 100 mg qd Chronic, stable  - Continue home medication Linzess 290 mcg PO qd  - Monitor for bowel movements

## 2021-09-16 NOTE — H&P ADULT - NSHPPHYSICALEXAM_GEN_ALL_CORE
T(C): 36.7 (09-15-21 @ 21:54), Max: 36.7 (09-15-21 @ 21:54)  HR: 60 (09-15-21 @ 23:47) (60 - 83)  BP: 137/73 (09-15-21 @ 23:47) (115/71 - 137/73)  RR: 16 (09-15-21 @ 23:47) (16 - 16)  SpO2: 98% (09-15-21 @ 23:47) (97% - 98%)    GENERAL: patient appears elderly, frail  EYES: sclera clear, no exudates  ENMT: oropharynx clear without erythema, no exudates, moist mucous membranes  NECK: supple, soft, no thyromegaly noted  LUNGS: good air entry bilaterally, clear to auscultation, symmetric breath sounds, no wheezing or rhonchi appreciated  HEART: soft S1/S2, regular rate and rhythm, no murmurs noted, no lower extremity edema  GASTROINTESTINAL: abdomen is soft, nontender, nondistended, normoactive bowel sounds, no palpable masses  INTEGUMENT: good skin turgor, no lesions noted  MUSCULOSKELETAL: no clubbing or cyanosis, no obvious deformity  NEUROLOGIC: awake, alert, oriented x3, good muscle tone in 4 extremities, no obvious sensory deficits  PSYCHIATRIC: mood is good, affect is congruent, linear and logical thought process  HEME/LYMPH: no palpable supraclavicular nodules, no obvious ecchymosis or petechiae T(C): 36.7 (09-15-21 @ 21:54), Max: 36.7 (09-15-21 @ 21:54)  HR: 60 (09-15-21 @ 23:47) (60 - 83)  BP: 137/73 (09-15-21 @ 23:47) (115/71 - 137/73)  RR: 16 (09-15-21 @ 23:47) (16 - 16)  SpO2: 98% (09-15-21 @ 23:47) (97% - 98%)    GENERAL: patient appears elderly, frail  EYES: sclera clear, no exudates  ENMT: oropharynx clear without erythema, no exudates, moist mucous membranes  NECK: supple, soft, no thyromegaly noted  LUNGS: good air entry bilaterally, clear to auscultation, symmetric breath sounds, no wheezing or rhonchi appreciated  HEART: soft S1/S2, regular rate and rhythm, no murmurs noted, no lower extremity edema  GASTROINTESTINAL: abdomen is soft, nontender, nondistended, normoactive bowel sounds, no palpable masses  INTEGUMENT: warm, dry  MUSCULOSKELETAL: no clubbing or cyanosis, no obvious deformity  NEUROLOGIC: awake, alert, oriented x3, strength 1/5 RUE, 2/5 bilateral LE, 2/5 LUE, sensation intact  PSYCHIATRIC: mood is good, affect is congruent, linear and logical thought process  HEME/LYMPH: no palpable supraclavicular nodules, no obvious ecchymosis or petechiae T(C): 36.7 (09-15-21 @ 21:54), Max: 36.7 (09-15-21 @ 21:54)  HR: 60 (09-15-21 @ 23:47) (60 - 83)  BP: 137/73 (09-15-21 @ 23:47) (115/71 - 137/73)  RR: 16 (09-15-21 @ 23:47) (16 - 16)  SpO2: 98% (09-15-21 @ 23:47) (97% - 98%)    GENERAL: patient appears elderly, frail  EYES: sclera clear, no exudates  ENMT: oropharynx clear without erythema, no exudates, moist mucous membranes  NECK: supple, soft, no thyromegaly noted  LUNGS: good air entry bilaterally, clear to auscultation, symmetric breath sounds, no wheezing or rhonchi appreciated  HEART: soft S1/S2, regular rate and rhythm, no murmurs noted, no lower extremity edema  GASTROINTESTINAL: abdomen is soft, nontender, nondistended, normoactive bowel sounds, no palpable masses  INTEGUMENT: warm, dry  MUSCULOSKELETAL: no clubbing or cyanosis, no obvious deformity  NEUROLOGIC: awake, alert, oriented x2-3, strength 1/5 RUE, 2/5 bilateral LE, 2/5 LUE, sensation intact  PSYCHIATRIC: mood is good, affect is congruent, linear and logical thought process  HEME/LYMPH: no palpable supraclavicular nodules, no obvious ecchymosis or petechiae

## 2021-09-16 NOTE — H&P ADULT - NSICDXPASTSURGICALHX_GEN_ALL_CORE_FT
PAST SURGICAL HISTORY:  Paterson filter in place 2009    H/O sinus surgery 2/2017    H/O Spinal surgery 2009    S/P Appendectomy 1968    S/P Cholecystectomy 1968    S/P Hysterectomy 1987    S/P TKR (Total Knee Replacement) Right 2015    S/P TKR (total knee replacement) Left 2009

## 2021-09-16 NOTE — OCCUPATIONAL THERAPY INITIAL EVALUATION ADULT - NEUROMUSCULAR RE-EDUCATION, PT EVAL
Patient will tolerate neuro re-ed techniques and improve AROM/strength right UE to assist with ADL's in 3-5 sessions.

## 2021-09-16 NOTE — OCCUPATIONAL THERAPY INITIAL EVALUATION ADULT - IMPAIRED TRANSFERS: SIT/STAND, REHAB EVAL
impaired balance/decreased flexibility/abnormal muscle tone/decreased strength assistance to hold walker with right UE/impaired balance/impaired coordination/abnormal muscle tone/decreased ROM/decreased strength

## 2021-09-16 NOTE — PHYSICAL THERAPY INITIAL EVALUATION ADULT - PERTINENT HX OF CURRENT PROBLEM, REHAB EVAL
80 yo F presenting from home with weakness and difficulty ambulating without assist. Patient states she was standing in the living room when she fell onto the couch because she felt weak.Pt's son states he noticed weakness in her right arm, endorsing that patient developed loss of strenMild chronic microvascular ischemic changes. CXR: bilateral infiltrates

## 2021-09-16 NOTE — PATIENT PROFILE ADULT - VISION (WITH CORRECTIVE LENSES IF THE PATIENT USUALLY WEARS THEM):
glasses at home/Partially impaired: cannot see medication labels or newsprint, but can see obstacles in path, and the surrounding layout; can count fingers at arm's length

## 2021-09-16 NOTE — H&P ADULT - PROBLEM SELECTOR PLAN 1
Patient presents with weakness and s/p fall  - Admit to [facility]  - CT head: Fluid levels in the left frontal sinus and right maxillary sinus due to acute sinusitis in the appropriate clinical setting. Chronic sphenoid sinusitis. No acute intracranial bleeding. Mild chronic microvascular ischemic changes.  - Fall precautions, bed alarm, chair alarm  - Check orthostatics  - PT eval  - Social work eval Patient presents with weakness and s/p fall onto couch  - Admit to [facility]  - CT head: Fluid levels in the left frontal sinus and right maxillary sinus due to acute sinusitis in the appropriate clinical setting. Chronic sphenoid sinusitis. No acute intracranial bleeding. Mild chronic microvascular ischemic changes.  - Fall precautions, bed alarm, chair alarm  - Check orthostatics  - PT eval  - Social work eval Patient presents with weakness in RUE and bilateral LE x past day likely 2/2 deconditioning vs. r/o CVA  - Admit to [facility]  - CT head: Fluid levels in the left frontal sinus and right maxillary sinus due to acute sinusitis in the appropriate clinical setting. Chronic sphenoid sinusitis. No acute intracranial bleeding. Mild chronic microvascular ischemic changes.   - Neurology (Dr. August) consulted, f/u recs Patient presents with weakness in RUE since yesterday likely 2/2 TIA vs. r/o CVA  - Admit to [facility] with remote tele  - CT head: Fluid levels in the left frontal sinus and right maxillary sinus due to acute sinusitis in the appropriate clinical setting. Chronic sphenoid sinusitis. No acute intracranial bleeding. Mild chronic microvascular ischemic changes.  - Aspirin 325 mg x1 in ED  - Start Lipitor 80 mg qhs  - Lipid profile, A1C  - Neuro checks q4h  - DVT ppx: Lovenox 40 mg subq qd  - Telemetry monitoring to r/o arrhythmia   - Pt passed bedside dysphagia screen by RN, f/u official speech & swallow evaluation  - Repeat CT head in 24 hours (or sooner if change in neuro status)  - Fall and aspiration precautions  - F/u TTE  - Neurology (Dr. August) consulted, f/u recs  - PT/OT evaluation Patient presents with weakness in RUE since yesterday - rule out CVA  - Admit to [facility] with remote telemetry  - CT head: Fluid levels in the left frontal sinus and right maxillary sinus due to acute sinusitis in the appropriate clinical setting. Chronic sphenoid sinusitis. No acute intracranial bleeding. Mild chronic microvascular ischemic changes.  - Aspirin 325 mg x1  - Start Lipitor 80 mg qhs  - Lipid profile, A1C, b12, folate, TSH low (check TFTs)  - Neuro checks q4h  - DVT ppx: Lovenox 40 mg subq qd  - Telemetry monitoring to r/o arrhythmia   - Pt passed bedside dysphagia screen by RN, f/u official speech & swallow evaluation  - F/u XR of right hand  - Fall and aspiration precautions  - F/u TTE, US carotids, MRI/A brain  - Neurology (Dr. August) consulted, f/u recs  - PT/OT evaluation Patient presents with weakness in RUE since yesterday - rule out CVA  - Admit to [facility] with remote telemetry  - CT head: Fluid levels in the left frontal sinus and right maxillary sinus due to acute sinusitis in the appropriate clinical setting. Chronic sphenoid sinusitis. No acute intracranial bleeding. Mild chronic microvascular ischemic changes.  - Aspirin 325 mg x1  - Start Lipitor 80 mg qhs  - Lipid profile, A1C, b12, folate, TSH low (check TFTs)  - Neuro checks q4h  - DVT ppx: Lovenox 40 mg subq qd  - Telemetry monitoring to r/o arrhythmia   - Pt passed bedside dysphagia screen by RN, f/u official speech & swallow evaluation  - F/u XR of right hand (pt states weakness happened before trauma)  - Fall and aspiration precautions  - F/u TTE, US carotids  - per patient - was told she cannot get MRIs after recent spinal hardware (had MRI in 2009)  - Neurology (Dr. August) consulted, f/u recs  - PT/OT evaluation

## 2021-09-16 NOTE — SWALLOW BEDSIDE ASSESSMENT ADULT - SWALLOW EVAL: DIAGNOSIS
1. Patient demonstrates a functional oral phase for puree, solids, nectar thick, and thin liquids marked by adequate manipulation (increased mastication time noted with solids, which Patient attributed to missing dentition; however functional), and timely collection, containment, and transport. 2. Patient demonstrates a judged functional pharyngeal phase for puree, solids, nectar thick, and thin liquids marked by suspected timely pharyngeal swallow trigger and hyolaryngeal elevation noted by digital palpation without evidence of airway penetration/aspiration. 3. Recommend regular consistencies and thin liquids, with aspiration precautions, as tolerated.

## 2021-09-17 LAB
COVID-19 SPIKE DOMAIN AB INTERP: POSITIVE
COVID-19 SPIKE DOMAIN ANTIBODY RESULT: >250 U/ML — HIGH
SARS-COV-2 IGG+IGM SERPL QL IA: >250 U/ML — HIGH
SARS-COV-2 IGG+IGM SERPL QL IA: POSITIVE

## 2021-09-17 PROCEDURE — 70544 MR ANGIOGRAPHY HEAD W/O DYE: CPT | Mod: 26,59

## 2021-09-17 PROCEDURE — 99233 SBSQ HOSP IP/OBS HIGH 50: CPT

## 2021-09-17 PROCEDURE — 70551 MRI BRAIN STEM W/O DYE: CPT | Mod: 26

## 2021-09-17 RX ADMIN — Medication 100 MICROGRAM(S): at 05:30

## 2021-09-17 RX ADMIN — ENOXAPARIN SODIUM 40 MILLIGRAM(S): 100 INJECTION SUBCUTANEOUS at 12:24

## 2021-09-17 RX ADMIN — Medication 6 MILLIGRAM(S): at 05:30

## 2021-09-17 RX ADMIN — PANTOPRAZOLE SODIUM 40 MILLIGRAM(S): 20 TABLET, DELAYED RELEASE ORAL at 05:30

## 2021-09-17 RX ADMIN — ATORVASTATIN CALCIUM 80 MILLIGRAM(S): 80 TABLET, FILM COATED ORAL at 22:16

## 2021-09-17 RX ADMIN — LACTULOSE 20 GRAM(S): 10 SOLUTION ORAL at 05:31

## 2021-09-17 RX ADMIN — LINACLOTIDE 290 MICROGRAM(S): 145 CAPSULE, GELATIN COATED ORAL at 05:30

## 2021-09-17 RX ADMIN — LACTULOSE 20 GRAM(S): 10 SOLUTION ORAL at 22:16

## 2021-09-17 RX ADMIN — Medication 325 MILLIGRAM(S): at 12:24

## 2021-09-18 DIAGNOSIS — I63.9 CEREBRAL INFARCTION, UNSPECIFIED: ICD-10-CM

## 2021-09-18 LAB
-  AMPICILLIN: SIGNIFICANT CHANGE UP
-  CIPROFLOXACIN: SIGNIFICANT CHANGE UP
-  LEVOFLOXACIN: SIGNIFICANT CHANGE UP
-  NITROFURANTOIN: SIGNIFICANT CHANGE UP
-  TETRACYCLINE: SIGNIFICANT CHANGE UP
-  VANCOMYCIN: SIGNIFICANT CHANGE UP
ANION GAP SERPL CALC-SCNC: 6 MMOL/L — SIGNIFICANT CHANGE UP (ref 5–17)
BUN SERPL-MCNC: 21 MG/DL — SIGNIFICANT CHANGE UP (ref 7–23)
CALCIUM SERPL-MCNC: 8.7 MG/DL — SIGNIFICANT CHANGE UP (ref 8.5–10.1)
CHLORIDE SERPL-SCNC: 104 MMOL/L — SIGNIFICANT CHANGE UP (ref 96–108)
CO2 SERPL-SCNC: 30 MMOL/L — SIGNIFICANT CHANGE UP (ref 22–31)
CREAT SERPL-MCNC: 0.63 MG/DL — SIGNIFICANT CHANGE UP (ref 0.5–1.3)
CULTURE RESULTS: SIGNIFICANT CHANGE UP
GLUCOSE SERPL-MCNC: 118 MG/DL — HIGH (ref 70–99)
HCT VFR BLD CALC: 41 % — SIGNIFICANT CHANGE UP (ref 34.5–45)
HGB BLD-MCNC: 13.1 G/DL — SIGNIFICANT CHANGE UP (ref 11.5–15.5)
MCHC RBC-ENTMCNC: 30.6 PG — SIGNIFICANT CHANGE UP (ref 27–34)
MCHC RBC-ENTMCNC: 32 GM/DL — SIGNIFICANT CHANGE UP (ref 32–36)
MCV RBC AUTO: 95.8 FL — SIGNIFICANT CHANGE UP (ref 80–100)
METHOD TYPE: SIGNIFICANT CHANGE UP
NRBC # BLD: 0 /100 WBCS — SIGNIFICANT CHANGE UP (ref 0–0)
ORGANISM # SPEC MICROSCOPIC CNT: SIGNIFICANT CHANGE UP
ORGANISM # SPEC MICROSCOPIC CNT: SIGNIFICANT CHANGE UP
PLATELET # BLD AUTO: 420 K/UL — HIGH (ref 150–400)
POTASSIUM SERPL-MCNC: 3.6 MMOL/L — SIGNIFICANT CHANGE UP (ref 3.5–5.3)
POTASSIUM SERPL-SCNC: 3.6 MMOL/L — SIGNIFICANT CHANGE UP (ref 3.5–5.3)
RBC # BLD: 4.28 M/UL — SIGNIFICANT CHANGE UP (ref 3.8–5.2)
RBC # FLD: 12.6 % — SIGNIFICANT CHANGE UP (ref 10.3–14.5)
SODIUM SERPL-SCNC: 140 MMOL/L — SIGNIFICANT CHANGE UP (ref 135–145)
SPECIMEN SOURCE: SIGNIFICANT CHANGE UP
T4 AB SER-ACNC: 9.2 UG/DL — SIGNIFICANT CHANGE UP (ref 4.6–12)
T4 FREE SERPL-MCNC: 1.7 NG/DL — SIGNIFICANT CHANGE UP (ref 0.9–1.8)
WBC # BLD: 13.53 K/UL — HIGH (ref 3.8–10.5)
WBC # FLD AUTO: 13.53 K/UL — HIGH (ref 3.8–10.5)

## 2021-09-18 PROCEDURE — 99233 SBSQ HOSP IP/OBS HIGH 50: CPT

## 2021-09-18 RX ADMIN — Medication 6 MILLIGRAM(S): at 05:35

## 2021-09-18 RX ADMIN — LACTULOSE 20 GRAM(S): 10 SOLUTION ORAL at 05:47

## 2021-09-18 RX ADMIN — LACTULOSE 20 GRAM(S): 10 SOLUTION ORAL at 22:09

## 2021-09-18 RX ADMIN — Medication 650 MILLIGRAM(S): at 23:09

## 2021-09-18 RX ADMIN — ENOXAPARIN SODIUM 40 MILLIGRAM(S): 100 INJECTION SUBCUTANEOUS at 11:35

## 2021-09-18 RX ADMIN — ATORVASTATIN CALCIUM 80 MILLIGRAM(S): 80 TABLET, FILM COATED ORAL at 22:09

## 2021-09-18 RX ADMIN — Medication 650 MILLIGRAM(S): at 11:25

## 2021-09-18 RX ADMIN — LACTULOSE 20 GRAM(S): 10 SOLUTION ORAL at 13:17

## 2021-09-18 RX ADMIN — Medication 100 MICROGRAM(S): at 05:35

## 2021-09-18 RX ADMIN — LINACLOTIDE 290 MICROGRAM(S): 145 CAPSULE, GELATIN COATED ORAL at 07:35

## 2021-09-18 RX ADMIN — Medication 325 MILLIGRAM(S): at 11:35

## 2021-09-18 RX ADMIN — Medication 650 MILLIGRAM(S): at 22:09

## 2021-09-18 RX ADMIN — Medication 650 MILLIGRAM(S): at 10:25

## 2021-09-18 RX ADMIN — Medication 650 MILLIGRAM(S): at 03:30

## 2021-09-18 RX ADMIN — Medication 650 MILLIGRAM(S): at 04:30

## 2021-09-18 RX ADMIN — PANTOPRAZOLE SODIUM 40 MILLIGRAM(S): 20 TABLET, DELAYED RELEASE ORAL at 05:35

## 2021-09-19 LAB
ANION GAP SERPL CALC-SCNC: 5 MMOL/L — SIGNIFICANT CHANGE UP (ref 5–17)
BUN SERPL-MCNC: 21 MG/DL — SIGNIFICANT CHANGE UP (ref 7–23)
CALCIUM SERPL-MCNC: 8.3 MG/DL — LOW (ref 8.5–10.1)
CHLORIDE SERPL-SCNC: 105 MMOL/L — SIGNIFICANT CHANGE UP (ref 96–108)
CO2 SERPL-SCNC: 31 MMOL/L — SIGNIFICANT CHANGE UP (ref 22–31)
CREAT SERPL-MCNC: 0.55 MG/DL — SIGNIFICANT CHANGE UP (ref 0.5–1.3)
GLUCOSE SERPL-MCNC: 79 MG/DL — SIGNIFICANT CHANGE UP (ref 70–99)
HCT VFR BLD CALC: 38.2 % — SIGNIFICANT CHANGE UP (ref 34.5–45)
HGB BLD-MCNC: 12.4 G/DL — SIGNIFICANT CHANGE UP (ref 11.5–15.5)
MCHC RBC-ENTMCNC: 30.8 PG — SIGNIFICANT CHANGE UP (ref 27–34)
MCHC RBC-ENTMCNC: 32.5 GM/DL — SIGNIFICANT CHANGE UP (ref 32–36)
MCV RBC AUTO: 95 FL — SIGNIFICANT CHANGE UP (ref 80–100)
NRBC # BLD: 0 /100 WBCS — SIGNIFICANT CHANGE UP (ref 0–0)
PLATELET # BLD AUTO: 368 K/UL — SIGNIFICANT CHANGE UP (ref 150–400)
POTASSIUM SERPL-MCNC: 3.9 MMOL/L — SIGNIFICANT CHANGE UP (ref 3.5–5.3)
POTASSIUM SERPL-SCNC: 3.9 MMOL/L — SIGNIFICANT CHANGE UP (ref 3.5–5.3)
RBC # BLD: 4.02 M/UL — SIGNIFICANT CHANGE UP (ref 3.8–5.2)
RBC # FLD: 12.4 % — SIGNIFICANT CHANGE UP (ref 10.3–14.5)
SODIUM SERPL-SCNC: 141 MMOL/L — SIGNIFICANT CHANGE UP (ref 135–145)
WBC # BLD: 12.37 K/UL — HIGH (ref 3.8–10.5)
WBC # FLD AUTO: 12.37 K/UL — HIGH (ref 3.8–10.5)

## 2021-09-19 PROCEDURE — 99232 SBSQ HOSP IP/OBS MODERATE 35: CPT

## 2021-09-19 RX ORDER — SENNA PLUS 8.6 MG/1
2 TABLET ORAL AT BEDTIME
Refills: 0 | Status: DISCONTINUED | OUTPATIENT
Start: 2021-09-19 | End: 2021-09-22

## 2021-09-19 RX ORDER — MULTIVIT WITH MIN/MFOLATE/K2 340-15/3 G
1 POWDER (GRAM) ORAL ONCE
Refills: 0 | Status: COMPLETED | OUTPATIENT
Start: 2021-09-19 | End: 2021-09-19

## 2021-09-19 RX ORDER — MINERAL OIL
133 OIL (ML) MISCELLANEOUS ONCE
Refills: 0 | Status: COMPLETED | OUTPATIENT
Start: 2021-09-19 | End: 2021-09-19

## 2021-09-19 RX ADMIN — PANTOPRAZOLE SODIUM 40 MILLIGRAM(S): 20 TABLET, DELAYED RELEASE ORAL at 06:20

## 2021-09-19 RX ADMIN — ONDANSETRON 4 MILLIGRAM(S): 8 TABLET, FILM COATED ORAL at 13:53

## 2021-09-19 RX ADMIN — LACTULOSE 20 GRAM(S): 10 SOLUTION ORAL at 21:48

## 2021-09-19 RX ADMIN — Medication 325 MILLIGRAM(S): at 11:40

## 2021-09-19 RX ADMIN — LACTULOSE 20 GRAM(S): 10 SOLUTION ORAL at 13:11

## 2021-09-19 RX ADMIN — Medication 1 BOTTLE: at 12:11

## 2021-09-19 RX ADMIN — LACTULOSE 20 GRAM(S): 10 SOLUTION ORAL at 06:20

## 2021-09-19 RX ADMIN — Medication 133 MILLILITER(S): at 10:42

## 2021-09-19 RX ADMIN — Medication 100 MICROGRAM(S): at 06:20

## 2021-09-19 RX ADMIN — LINACLOTIDE 290 MICROGRAM(S): 145 CAPSULE, GELATIN COATED ORAL at 06:20

## 2021-09-19 RX ADMIN — SENNA PLUS 2 TABLET(S): 8.6 TABLET ORAL at 21:49

## 2021-09-19 RX ADMIN — ENOXAPARIN SODIUM 40 MILLIGRAM(S): 100 INJECTION SUBCUTANEOUS at 11:40

## 2021-09-19 RX ADMIN — ATORVASTATIN CALCIUM 80 MILLIGRAM(S): 80 TABLET, FILM COATED ORAL at 21:49

## 2021-09-19 RX ADMIN — Medication 650 MILLIGRAM(S): at 22:19

## 2021-09-19 RX ADMIN — Medication 650 MILLIGRAM(S): at 21:49

## 2021-09-20 ENCOUNTER — TRANSCRIPTION ENCOUNTER (OUTPATIENT)
Age: 81
End: 2021-09-20

## 2021-09-20 LAB
ANION GAP SERPL CALC-SCNC: 6 MMOL/L — SIGNIFICANT CHANGE UP (ref 5–17)
BUN SERPL-MCNC: 20 MG/DL — SIGNIFICANT CHANGE UP (ref 7–23)
CALCIUM SERPL-MCNC: 8.2 MG/DL — LOW (ref 8.5–10.1)
CHLORIDE SERPL-SCNC: 104 MMOL/L — SIGNIFICANT CHANGE UP (ref 96–108)
CO2 SERPL-SCNC: 29 MMOL/L — SIGNIFICANT CHANGE UP (ref 22–31)
CREAT SERPL-MCNC: 0.59 MG/DL — SIGNIFICANT CHANGE UP (ref 0.5–1.3)
GLUCOSE SERPL-MCNC: 89 MG/DL — SIGNIFICANT CHANGE UP (ref 70–99)
HCT VFR BLD CALC: 40.2 % — SIGNIFICANT CHANGE UP (ref 34.5–45)
HGB BLD-MCNC: 13.2 G/DL — SIGNIFICANT CHANGE UP (ref 11.5–15.5)
MCHC RBC-ENTMCNC: 30.8 PG — SIGNIFICANT CHANGE UP (ref 27–34)
MCHC RBC-ENTMCNC: 32.8 GM/DL — SIGNIFICANT CHANGE UP (ref 32–36)
MCV RBC AUTO: 93.7 FL — SIGNIFICANT CHANGE UP (ref 80–100)
NRBC # BLD: 0 /100 WBCS — SIGNIFICANT CHANGE UP (ref 0–0)
PLATELET # BLD AUTO: 343 K/UL — SIGNIFICANT CHANGE UP (ref 150–400)
POTASSIUM SERPL-MCNC: 4.5 MMOL/L — SIGNIFICANT CHANGE UP (ref 3.5–5.3)
POTASSIUM SERPL-SCNC: 4.5 MMOL/L — SIGNIFICANT CHANGE UP (ref 3.5–5.3)
RBC # BLD: 4.29 M/UL — SIGNIFICANT CHANGE UP (ref 3.8–5.2)
RBC # FLD: 12.5 % — SIGNIFICANT CHANGE UP (ref 10.3–14.5)
SARS-COV-2 RNA SPEC QL NAA+PROBE: DETECTED
SODIUM SERPL-SCNC: 139 MMOL/L — SIGNIFICANT CHANGE UP (ref 135–145)
WBC # BLD: 15.32 K/UL — HIGH (ref 3.8–10.5)
WBC # FLD AUTO: 15.32 K/UL — HIGH (ref 3.8–10.5)

## 2021-09-20 PROCEDURE — 99232 SBSQ HOSP IP/OBS MODERATE 35: CPT

## 2021-09-20 RX ORDER — LIDOCAINE 4 G/100G
1 CREAM TOPICAL DAILY
Refills: 0 | Status: DISCONTINUED | OUTPATIENT
Start: 2021-09-20 | End: 2021-09-22

## 2021-09-20 RX ORDER — INFLUENZA VIRUS VACCINE 15; 15; 15; 15 UG/.5ML; UG/.5ML; UG/.5ML; UG/.5ML
0.5 SUSPENSION INTRAMUSCULAR ONCE
Refills: 0 | Status: DISCONTINUED | OUTPATIENT
Start: 2021-09-20 | End: 2021-09-22

## 2021-09-20 RX ADMIN — Medication 650 MILLIGRAM(S): at 18:51

## 2021-09-20 RX ADMIN — Medication 650 MILLIGRAM(S): at 09:40

## 2021-09-20 RX ADMIN — ONDANSETRON 4 MILLIGRAM(S): 8 TABLET, FILM COATED ORAL at 11:27

## 2021-09-20 RX ADMIN — Medication 325 MILLIGRAM(S): at 11:25

## 2021-09-20 RX ADMIN — Medication 650 MILLIGRAM(S): at 19:51

## 2021-09-20 RX ADMIN — Medication 100 MICROGRAM(S): at 06:15

## 2021-09-20 RX ADMIN — LACTULOSE 20 GRAM(S): 10 SOLUTION ORAL at 06:15

## 2021-09-20 RX ADMIN — ATORVASTATIN CALCIUM 80 MILLIGRAM(S): 80 TABLET, FILM COATED ORAL at 21:06

## 2021-09-20 RX ADMIN — LIDOCAINE 1 APPLICATION(S): 4 CREAM TOPICAL at 22:10

## 2021-09-20 RX ADMIN — PANTOPRAZOLE SODIUM 40 MILLIGRAM(S): 20 TABLET, DELAYED RELEASE ORAL at 06:15

## 2021-09-20 RX ADMIN — Medication 650 MILLIGRAM(S): at 08:58

## 2021-09-20 RX ADMIN — LACTULOSE 20 GRAM(S): 10 SOLUTION ORAL at 11:26

## 2021-09-20 RX ADMIN — SENNA PLUS 2 TABLET(S): 8.6 TABLET ORAL at 21:06

## 2021-09-20 RX ADMIN — LINACLOTIDE 290 MICROGRAM(S): 145 CAPSULE, GELATIN COATED ORAL at 06:15

## 2021-09-20 RX ADMIN — ENOXAPARIN SODIUM 40 MILLIGRAM(S): 100 INJECTION SUBCUTANEOUS at 11:25

## 2021-09-20 RX ADMIN — LACTULOSE 20 GRAM(S): 10 SOLUTION ORAL at 21:06

## 2021-09-21 ENCOUNTER — TRANSCRIPTION ENCOUNTER (OUTPATIENT)
Age: 81
End: 2021-09-21

## 2021-09-21 LAB
HCT VFR BLD CALC: 42 % — SIGNIFICANT CHANGE UP (ref 34.5–45)
HGB BLD-MCNC: 13.3 G/DL — SIGNIFICANT CHANGE UP (ref 11.5–15.5)
MCHC RBC-ENTMCNC: 30.3 PG — SIGNIFICANT CHANGE UP (ref 27–34)
MCHC RBC-ENTMCNC: 31.7 GM/DL — LOW (ref 32–36)
MCV RBC AUTO: 95.7 FL — SIGNIFICANT CHANGE UP (ref 80–100)
NRBC # BLD: 0 /100 WBCS — SIGNIFICANT CHANGE UP (ref 0–0)
PLATELET # BLD AUTO: 338 K/UL — SIGNIFICANT CHANGE UP (ref 150–400)
RBC # BLD: 4.39 M/UL — SIGNIFICANT CHANGE UP (ref 3.8–5.2)
RBC # FLD: 12.5 % — SIGNIFICANT CHANGE UP (ref 10.3–14.5)
WBC # BLD: 17.63 K/UL — HIGH (ref 3.8–10.5)
WBC # FLD AUTO: 17.63 K/UL — HIGH (ref 3.8–10.5)

## 2021-09-21 PROCEDURE — 99232 SBSQ HOSP IP/OBS MODERATE 35: CPT | Mod: GC

## 2021-09-21 RX ORDER — LACTULOSE 10 G/15ML
20 SOLUTION ORAL
Qty: 0 | Refills: 0 | DISCHARGE
Start: 2021-09-21

## 2021-09-21 RX ORDER — ASPIRIN/CALCIUM CARB/MAGNESIUM 324 MG
1 TABLET ORAL
Qty: 9 | Refills: 0
Start: 2021-09-21 | End: 2021-09-29

## 2021-09-21 RX ORDER — ATORVASTATIN CALCIUM 80 MG/1
1 TABLET, FILM COATED ORAL
Qty: 30 | Refills: 0
Start: 2021-09-21 | End: 2021-10-20

## 2021-09-21 RX ORDER — HYDROCORTISONE 1 %
1 OINTMENT (GRAM) TOPICAL DAILY
Refills: 0 | Status: DISCONTINUED | OUTPATIENT
Start: 2021-09-21 | End: 2021-09-22

## 2021-09-21 RX ORDER — RIVAROXABAN 15 MG-20MG
1 KIT ORAL
Qty: 0 | Refills: 0 | DISCHARGE

## 2021-09-21 RX ORDER — ASPIRIN/CALCIUM CARB/MAGNESIUM 324 MG
1 TABLET ORAL
Qty: 30 | Refills: 0
Start: 2021-09-21 | End: 2021-10-20

## 2021-09-21 RX ADMIN — Medication 100 MICROGRAM(S): at 06:15

## 2021-09-21 RX ADMIN — PANTOPRAZOLE SODIUM 40 MILLIGRAM(S): 20 TABLET, DELAYED RELEASE ORAL at 06:15

## 2021-09-21 RX ADMIN — LACTULOSE 20 GRAM(S): 10 SOLUTION ORAL at 12:30

## 2021-09-21 RX ADMIN — LIDOCAINE 1 APPLICATION(S): 4 CREAM TOPICAL at 12:30

## 2021-09-21 RX ADMIN — ENOXAPARIN SODIUM 40 MILLIGRAM(S): 100 INJECTION SUBCUTANEOUS at 12:30

## 2021-09-21 RX ADMIN — Medication 650 MILLIGRAM(S): at 03:30

## 2021-09-21 RX ADMIN — Medication 325 MILLIGRAM(S): at 12:32

## 2021-09-21 RX ADMIN — Medication 1 SUPPOSITORY(S): at 06:15

## 2021-09-21 RX ADMIN — LINACLOTIDE 290 MICROGRAM(S): 145 CAPSULE, GELATIN COATED ORAL at 06:15

## 2021-09-21 RX ADMIN — LACTULOSE 20 GRAM(S): 10 SOLUTION ORAL at 06:15

## 2021-09-21 RX ADMIN — SENNA PLUS 2 TABLET(S): 8.6 TABLET ORAL at 23:20

## 2021-09-21 RX ADMIN — LACTULOSE 20 GRAM(S): 10 SOLUTION ORAL at 23:20

## 2021-09-21 RX ADMIN — ATORVASTATIN CALCIUM 80 MILLIGRAM(S): 80 TABLET, FILM COATED ORAL at 23:20

## 2021-09-21 RX ADMIN — Medication 650 MILLIGRAM(S): at 02:30

## 2021-09-21 NOTE — DISCHARGE NOTE PROVIDER - CARE PROVIDER_API CALL
Main Nicholas AND [facility] [place] [address]  Phone: [phone]  Fax: [phone]  Follow Up Time:    Main Nicholas AND [facility] [address] [place] [address]  Phone: [phone]  Fax: [phone]  Follow Up Time:     Kathy Winslow)  Neurology  [address] [place] [address]  Phone: [phone]  Fax: [phone]  Follow Up Time:    Main Nicholas AND [facility] [address] [place] [address]  Phone: [phone]  Fax: [phone]  Follow Up Time: 1 week    Kathy Winslow)  Neurology  [address] [place] [address]  Phone: [phone]  Fax: [phone]  Follow Up Time: 1 week

## 2021-09-21 NOTE — PROGRESS NOTE ADULT - PROBLEM SELECTOR PLAN 6
Chronic  - Continue home Levothyroxine 100 mg qd

## 2021-09-21 NOTE — DISCHARGE NOTE PROVIDER - NSDCCONDITION_GEN_ALL_CORE
Letter done and being mailed to pt. Letter also done for  as she requested but will have nurse call Alvin to confirm that it is OK for him to have me do this also. If OK, then letter will be mailed for him also   Stable

## 2021-09-21 NOTE — PROGRESS NOTE ADULT - ASSESSMENT
80 yo F PMHx hypothyroidism, irritable bowel syndrome, COVID-19 PNA presenting from home with weakness and fall admitted for acute weakness in RUE and change in mental status, r/o CVA.
Ms Ferrer is a 82 yo F PMH Hypothyroidism, Irritable Bowel Syndrome presenting from home with SOB with a recent COVID-19 exposure [her son] who was diagnosed with COVID here at  but then sent home and his unvaccinated mother cared for him.  She reports   symptom onset 8/30.  She is NOT vaccinated against COVID-19   COVID-19 PCR: Detected (07 Sep 2021 04:49)    RECOMMENDATIONS  9/7- on NC-STEROIDs started, LMWH started  9/8 NC-3L, NLR 6.11/0.87=7, REMDESIVIR started, rec continued steroids and LMWH-continue supportive care, noted asymmetry on CXR so will follow for any indication that abx would be of benefit  9/9 VENTURI, NLR10.23/0.79=13, will increased steroids to 10mg/day-last day 9/16, noted CXR, will added abx to cover for potential secondary bacterial process (complicated list of allergies so started meropenem) ordered additional testing  9/10 escalated to NRB, NLR 8.44/0.73=11.6, progressing w increasing oxygen support despite increased steroids, daughter 'Oxana Ferrer' same name [phone].  9/11-pt de-escalated to NC-5L, noted rise in WBC, pt feels improved, continue meropenem, remdesivir, LMWH, steroids  9/12 NC-4L, pt reports she feels much better day 4 of meropenem   9/13 NC-5L, today, Monday as last day of abx   9/14 NC-3L, pt reporting she feels well -can start looking at dispo plans for possible dc Weds  -----  9/18-pt readmitted with weakness found to have stroke, will coordinate with neurology for stroke managment    We will follow along in the care of this patient. Please call us at [phone] or text me directly on my cell# at [phone] with any concerns.  
Ms Ferrer is a 82 yo F PMH Hypothyroidism, Irritable Bowel Syndrome presenting from home with SOB with a recent COVID-19 exposure [her son] who was diagnosed with COVID here at  but then sent home and his unvaccinated mother cared for him.  She reports   symptom onset 8/30.  She is NOT vaccinated against COVID-19   COVID-19 PCR: Detected (07 Sep 2021 04:49)    RECOMMENDATIONS  9/7- on NC-STEROIDs started, LMWH started  9/8 NC-3L, NLR 6.11/0.87=7, REMDESIVIR started, rec continued steroids and LMWH-continue supportive care, noted asymmetry on CXR so will follow for any indication that abx would be of benefit  9/9 VENTURI, NLR10.23/0.79=13, will increased steroids to 10mg/day-last day 9/16, noted CXR, will added abx to cover for potential secondary bacterial process (complicated list of allergies so started meropenem) ordered additional testing  9/10 escalated to NRB, NLR 8.44/0.73=11.6, progressing w increasing oxygen support despite increased steroids, daughter 'Oxana Ferrer' same name [phone].  9/11-pt de-escalated to NC-5L, noted rise in WBC, pt feels improved, continue meropenem, remdesivir, LMWH, steroids  9/12 NC-4L, pt reports she feels much better day 4 of meropenem   9/13 NC-5L, today, Monday as last day of abx   9/14 NC-3L, pt reporting she feels well -can start looking at dispo plans for possible dc Weds  -----  -pt readmitted with weakness found to have stroke, will coordinate with neurology for stroke management, will dicuss with pt relationship to COVID and vaccination    We will follow along in the care of this patient. Please call us at [phone] or text me directly on my cell# at [phone] with any concerns.  
80 yo F PMHx hypothyroidism, irritable bowel syndrome, COVID-19 PNA presenting from home with weakness and fall admitted for acute weakness in RUE and change in mental status, r/o CVA.
82 yo F PMHx hypothyroidism, irritable bowel syndrome, COVID-19 PNA presenting from home with weakness and fall admitted for acute weakness in RUE and change in mental status, r/o CVA.

## 2021-09-21 NOTE — DISCHARGE NOTE NURSING/CASE MANAGEMENT/SOCIAL WORK - NSDCPEPTSTROKESIGNS_GEN_ALL_CORE
Sudden numbness or weakness of the face, arm, or leg, especially on one side of the body. Confusion, trouble speaking or understanding. Trouble seeing in one or both eyes. Trouble walking, dizziness, loss of balance or coordination. Severe headache.
Sudden numbness or weakness of the face, arm, or leg, especially on one side of the body. Confusion, trouble speaking or understanding. Trouble seeing in one or both eyes. Trouble walking, dizziness, loss of balance or coordination. Severe headache.

## 2021-09-21 NOTE — PROGRESS NOTE ADULT - PROBLEM SELECTOR PLAN 5
Chronic  - Continue home medication Linzess 290 mcg PO qd  - continue lactulose, added senna  -Patient given mag citrate and 2 enemas today without success, appears in distress, patient with manual disimpaction by PGY-1 with relief. Continue to monitor for BMs
Chronic, stable  - Continue home medication Linzess 290 mcg PO qd  - Monitor for bowel movements
Chronic, stable  - Continue home medication Linzess 290 mcg PO qd  - Monitor for bowel movements

## 2021-09-21 NOTE — DISCHARGE NOTE PROVIDER - PROVIDER TOKENS
PROVIDER:[TOKEN:[[id number]]] PROVIDER:[TOKEN:[[id number]]],PROVIDER:[TOKEN:[[id number]]] PROVIDER:[TOKEN:[[id number]],FOLLOWUP:[1 week]],PROVIDER:[TOKEN:[[id number]],FOLLOWUP:[1 week]]

## 2021-09-21 NOTE — DISCHARGE NOTE NURSING/CASE MANAGEMENT/SOCIAL WORK - NSDCPEFALRISK_GEN_ALL_CORE
For information on Fall & injury Prevention, visit [email or web address]
For information on Fall & injury Prevention, visit [email or web address]

## 2021-09-21 NOTE — PROGRESS NOTE ADULT - PROBLEM SELECTOR PLAN 4
Chronic  - Continue Lactulose 20 mg PO TID, hold for diarrhea  - Continue home medication Linzess 290 mcg PO qd  - Monitor for bowel movements

## 2021-09-21 NOTE — DISCHARGE NOTE PROVIDER - HOSPITAL COURSE
FROM ADMISSION H+P:   HPI:  82 yo F PMHx hypothyroidism, irritable bowel syndrome presenting from home with weakness. Of note, patient admitted to [facility] from 9/7-13 for COVID pneumonia. Patient was discharged home. Patient is a limited historian, responding "I'm confused" to most questions. Further history obtained from patient's son, Adriel. She lives at home with her son. Patient's son, Adriel, states patient is a  and works full time. Patient is independent and does not use any assistive devices. Patient's son states she has had difficulty walking up the stairs in the house and requires assistance from son to walk around the house. Patient's son states he noticed weakness in her right arm, endorsing that patient developed loss of strength in her right arm last night. Patient denies loss of sensation. Patient states she was standing in the living room when she fell onto the couch because she felt weak. Denies trauma or injury. Admits weakness, loss of strength in RUE. She states that her right hand weakness has been ongoing from one day prior to arrival. Denies chest pain, shortness of breath, headache, dizziness, paresthesias, numbness/tingling. Of note, son states that they never picked up the xarelto. They were unaware it was sent to the pharmacy to start as a home medication and states that the pharmacy only had decadron waiting for them.    ED course:  Vitals: T 98 HR 83 /71 RR 16 SpO2 97% on RA  Labs: WBC 11.85, Plt 454, Neut 10.55 (89.1%), Lymph 0.68, Glu 176, Alb 2.8, Alk phos 134, AST 40, , TSH 0.10  CT head: Fluid levels in the left frontal sinus and right maxillary sinus due to acute sinusitis in the appropriate clinical setting. Chronic sphenoid sinusitis. No acute intracranial bleeding. Mild chronic microvascular ischemic changes.  CXR: bilateral infiltrates  EKG: NSR @69 bpm, LAD, incomplete RBBB (16 Sep 2021 00:35)      ---  HOSPITAL COURSE: Patient admitted for rule out CVA. Patient given full dose aspirin and started on high dose lipitor. B12, TSH, lipid panel _____ TTE and carotid US showed _____.   COVID PCR positive during s   Patient was medically optimized and improved clinically throughout hospital course. Patient seen and examined on day of discharge. Patient is medically stable for discharge to ______ with outpatient follow up.       updated     ---  CONSULTANTS:   Neurology Dr August    ---    TIME SPENT:  I, the attending physician, was physically present for the key portions of the evaluation and management (E/M) service provided. The total amount of time spent reviewing the hospital notes, laboratory values, imaging findings, assessing/counseling the patient, discussing with consultant physicians, social work, nursing staff was -- minutes    ---  Primary care provider was made aware of plan for discharge:      [  ] NO     [ x ] YES     FROM ADMISSION H+P:   HPI:  80 yo F PMHx hypothyroidism, irritable bowel syndrome presenting from home with weakness. Of note, patient admitted to [facility] from 9/7-13 for COVID pneumonia. Patient was discharged home. Patient is a limited historian, responding "I'm confused" to most questions. Further history obtained from patient's son, Adriel. She lives at home with her son. Patient's son, Adriel, states patient is a  and works full time. Patient is independent and does not use any assistive devices. Patient's son states she has had difficulty walking up the stairs in the house and requires assistance from son to walk around the house. Patient's son states he noticed weakness in her right arm, endorsing that patient developed loss of strength in her right arm last night. Patient denies loss of sensation. Patient states she was standing in the living room when she fell onto the couch because she felt weak. Denies trauma or injury. Admits weakness, loss of strength in RUE. She states that her right hand weakness has been ongoing from one day prior to arrival. Denies chest pain, shortness of breath, headache, dizziness, paresthesias, numbness/tingling. Of note, son states that they never picked up the xarelto. They were unaware it was sent to the pharmacy to start as a home medication and states that the pharmacy only had decadron waiting for them.    ED course:  Vitals: T 98 HR 83 /71 RR 16 SpO2 97% on RA  Labs: WBC 11.85, Plt 454, Neut 10.55 (89.1%), Lymph 0.68, Glu 176, Alb 2.8, Alk phos 134, AST 40, , TSH 0.10  CT head: Fluid levels in the left frontal sinus and right maxillary sinus due to acute sinusitis in the appropriate clinical setting. Chronic sphenoid sinusitis. No acute intracranial bleeding. Mild chronic microvascular ischemic changes.  CXR: bilateral infiltrates  EKG: NSR @69 bpm, LAD, incomplete RBBB (16 Sep 2021 00:35)      ---  HOSPITAL COURSE: Patient admitted for rule out CVA. Patient given full dose aspirin and started on high dose lipitor. B12/folate were normal/high. elevated triglycerides, LDL near goal (74), A1C is 6.3, B12 and folate normal/high, TSH low, T3 low. Would recommend further workup outpatient given pt was acutely ill. TTE and carotid US showed _____. PT and OT evaluated pt and recommended rehab. NIHSS found to be 4, patient not a candidate for tPA given time frame. MRI showed acute CVA. Neurology recommended permissive HTN for 48 hours without needing midodrine. COVID PCR positive during asymptomatic screening upon discharge.   Patient was medically optimized and improved clinically throughout hospital course. Patient seen and examined on day of discharge. Patient is medically stable for discharge to rehab with outpatient follow up.       updated 9/21    ---  CONSULTANTS:   Neurology Dr August  ID Dr Mcmullen     ---    TIME SPENT:  I, the attending physician, was physically present for the key portions of the evaluation and management (E/M) service provided. The total amount of time spent reviewing the hospital notes, laboratory values, imaging findings, assessing/counseling the patient, discussing with consultant physicians, social work, nursing staff was -- minutes    ---  Primary care provider was made aware of plan for discharge:      [  ] NO     [ x ] YES     FROM ADMISSION H+P:   HPI:  80 yo F PMHx hypothyroidism, irritable bowel syndrome presenting from home with weakness. Of note, patient admitted to [facility] from 9/7-13 for COVID pneumonia. Patient was discharged home. Patient is a limited historian, responding "I'm confused" to most questions. Further history obtained from patient's son, Adriel. She lives at home with her son. Patient's son, Adriel, states patient is a  and works full time. Patient is independent and does not use any assistive devices. Patient's son states she has had difficulty walking up the stairs in the house and requires assistance from son to walk around the house. Patient's son states he noticed weakness in her right arm, endorsing that patient developed loss of strength in her right arm last night. Patient denies loss of sensation. Patient states she was standing in the living room when she fell onto the couch because she felt weak. Denies trauma or injury. Admits weakness, loss of strength in RUE. She states that her right hand weakness has been ongoing from one day prior to arrival. Denies chest pain, shortness of breath, headache, dizziness, paresthesias, numbness/tingling. Of note, son states that they never picked up the xarelto. They were unaware it was sent to the pharmacy to start as a home medication and states that the pharmacy only had decadron waiting for them.    ED course:  Vitals: T 98 HR 83 /71 RR 16 SpO2 97% on RA  Labs: WBC 11.85, Plt 454, Neut 10.55 (89.1%), Lymph 0.68, Glu 176, Alb 2.8, Alk phos 134, AST 40, , TSH 0.10  CT head: Fluid levels in the left frontal sinus and right maxillary sinus due to acute sinusitis in the appropriate clinical setting. Chronic sphenoid sinusitis. No acute intracranial bleeding. Mild chronic microvascular ischemic changes.  CXR: bilateral infiltrates  EKG: NSR @69 bpm, LAD, incomplete RBBB (16 Sep 2021 00:35)      ---  HOSPITAL COURSE: Patient admitted for rule out CVA. Patient given full dose aspirin and started on high dose lipitor. B12/folate were normal/high. elevated triglycerides, LDL near goal (74), A1C is 6.3, B12 and folate normal/high, TSH low, T3 low. Would recommend further workup outpatient given pt was acutely ill. TTE and carotid US showed EF 55-60%. PT and OT evaluated pt and recommended rehab. NIHSS found to be 4, patient not a candidate for tPA given time frame. MRI showed acute CVA. Neurology recommended permissive HTN for 48 hours without needing midodrine. COVID PCR positive during asymptomatic screening upon discharge.   Patient was medically optimized and improved clinically throughout hospital course. Patient seen and examined on day of discharge. Patient is medically stable for discharge to rehab with outpatient follow up.       updated 9/21    ---  CONSULTANTS:   Neurology Dr August  ID Dr Mcmullen     ---    TIME SPENT:  I, the attending physician, was physically present for the key portions of the evaluation and management (E/M) service provided. The total amount of time spent reviewing the hospital notes, laboratory values, imaging findings, assessing/counseling the patient, discussing with consultant physicians, social work, nursing staff was -- minutes    ---  Primary care provider was made aware of plan for discharge:      [  ] NO     [ x ] YES     FROM ADMISSION H+P:   HPI:  80 yo F PMHx hypothyroidism, irritable bowel syndrome presenting from home with weakness. Of note, patient admitted to [facility] from 9/7-13 for COVID pneumonia. Patient was discharged home. Patient is a limited historian, responding "I'm confused" to most questions. Further history obtained from patient's son, Adriel. She lives at home with her son. Patient's son, Adriel, states patient is a  and works full time. Patient is independent and does not use any assistive devices. Patient's son states she has had difficulty walking up the stairs in the house and requires assistance from son to walk around the house. Patient's son states he noticed weakness in her right arm, endorsing that patient developed loss of strength in her right arm last night. Patient denies loss of sensation. Patient states she was standing in the living room when she fell onto the couch because she felt weak. Denies trauma or injury. Admits weakness, loss of strength in RUE. She states that her right hand weakness has been ongoing from one day prior to arrival. Denies chest pain, shortness of breath, headache, dizziness, paresthesias, numbness/tingling. Of note, son states that they never picked up the xarelto. They were unaware it was sent to the pharmacy to start as a home medication and states that the pharmacy only had decadron waiting for them.    ED course:  Vitals: T 98 HR 83 /71 RR 16 SpO2 97% on RA  Labs: WBC 11.85, Plt 454, Neut 10.55 (89.1%), Lymph 0.68, Glu 176, Alb 2.8, Alk phos 134, AST 40, , TSH 0.10  CT head: Fluid levels in the left frontal sinus and right maxillary sinus due to acute sinusitis in the appropriate clinical setting. Chronic sphenoid sinusitis. No acute intracranial bleeding. Mild chronic microvascular ischemic changes.  CXR: bilateral infiltrates  EKG: NSR @69 bpm, LAD, incomplete RBBB (16 Sep 2021 00:35)      ---  HOSPITAL COURSE: Patient admitted for rule out CVA. Patient given full dose aspirin and started on high dose lipitor. B12/folate were normal/high. elevated triglycerides, LDL near goal (74), A1C is 6.3, B12 and folate normal/high, TSH low, T3 low. Would recommend further workup outpatient given pt was acutely ill. TTE and carotid US showed EF 55-60%. PT and OT evaluated pt and recommended rehab. NIHSS found to be 4, patient not a candidate for tPA given time frame. MRI showed acute CVA. Neurology recommended permissive HTN for 48 hours without needing midodrine. COVID PCR positive during asymptomatic screening upon discharge.   Patient was medically optimized and improved clinically throughout hospital course. Patient seen and examined on day of discharge. Patient is medically stable for discharge to home with home care.    ---  CONSULTANTS:   Neurology Dr August  ID Dr Mcmullen     ---    TIME SPENT:  I, the attending physician, was physically present for the key portions of the evaluation and management (E/M) service provided. The total amount of time spent reviewing the hospital notes, laboratory values, imaging findings, assessing/counseling the patient, discussing with consultant physicians, social work, nursing staff was 43 minutes    ---  Primary care provider was made aware of plan for discharge:      [  ] NO     [ x ] YES     FROM ADMISSION H+P:   HPI:  82 yo F PMHx hypothyroidism, irritable bowel syndrome presenting from home with weakness. Of note, patient admitted to [facility] from 9/7-13 for COVID pneumonia. Patient was discharged home. Patient is a limited historian, responding "I'm confused" to most questions. Further history obtained from patient's son, Adriel. She lives at home with her son. Patient's son, Adriel, states patient is a  and works full time. Patient is independent and does not use any assistive devices. Patient's son states she has had difficulty walking up the stairs in the house and requires assistance from son to walk around the house. Patient's son states he noticed weakness in her right arm, endorsing that patient developed loss of strength in her right arm last night. Patient denies loss of sensation. Patient states she was standing in the living room when she fell onto the couch because she felt weak. Denies trauma or injury. Admits weakness, loss of strength in RUE. She states that her right hand weakness has been ongoing from one day prior to arrival. Denies chest pain, shortness of breath, headache, dizziness, paresthesias, numbness/tingling. Of note, son states that they never picked up the xarelto. They were unaware it was sent to the pharmacy to start as a home medication and states that the pharmacy only had decadron waiting for them.    ED course:  Vitals: T 98 HR 83 /71 RR 16 SpO2 97% on RA  Labs: WBC 11.85, Plt 454, Neut 10.55 (89.1%), Lymph 0.68, Glu 176, Alb 2.8, Alk phos 134, AST 40, , TSH 0.10  CT head: Fluid levels in the left frontal sinus and right maxillary sinus due to acute sinusitis in the appropriate clinical setting. Chronic sphenoid sinusitis. No acute intracranial bleeding. Mild chronic microvascular ischemic changes.  CXR: bilateral infiltrates  EKG: NSR @69 bpm, LAD, incomplete RBBB (16 Sep 2021 00:35)      ---  HOSPITAL COURSE: Patient admitted for rule out CVA. Patient given full dose aspirin and started on high dose lipitor. B12/folate were normal/high. elevated triglycerides, LDL near goal (74), A1C is 6.3, B12 and folate normal/high, TSH low, T3 low. Would recommend further workup outpatient given pt was acutely ill. TTE showed EF 55-60%. Carotid US showed no significant stenosis. PT and OT evaluated pt and recommended rehab. NIHSS found to be 4, patient not a candidate for tPA given time frame. MRI showed acute CVA. Neurology recommended permissive HTN for 48 hours without needing midodrine. Infectious disease consulted because of patient's recent hospitalization due to Covid-19 infection. Patient followed and monitored closely by ID. Patient c/o constipation 2/2 known history of irritable bowel syndrome. Patient experienced no relief with multiple enemas, patient was manually disimpacted with resolution. Upon reassessment by PT, patient showed substantial improvement and recommendation changed for patient to be discharged home with home PT. COVID PCR positive during asymptomatic screening upon discharge. Patient to be discharged home with virtual PT due to positive test result. During this admission, leukocytosis noted; per ID, likely secondary to recent steroid use. Upon discharge, WBC count still elevated but downtrending. Patient to follow up with PCP outpatient within one week to monitor.  Patient was medically optimized and improved clinically throughout hospital course. Patient seen and examined on day of discharge. Patient is medically stable for discharge to home with home care.    T(C): 36.8 (09-22-21 @ 05:49), Max: 37 (09-21-21 @ 13:35)  HR: 66 (09-22-21 @ 05:49) (66 - 78)  BP: 99/64 (09-22-21 @ 05:49) (86/56 - 99/64)  RR: 18 (09-22-21 @ 05:49) (17 - 18)  SpO2: 93% (09-22-21 @ 05:49) (91% - 93%)    PHYSICAL EXAM:  GENERAL: NAD  HEENT:  anicteric, moist mucous membranes  CHEST/LUNG:  CTA b/l, no rales, wheezes, or rhonchi  HEART:  RRR, S1, S2  ABDOMEN:  BS+, soft, nontender, mildly distended  EXTREMITIES: no edema, cyanosis, or calf tenderness  NERVOUS SYSTEM: answers questions and follows commands appropriately, intact sensation all 4 extremities, 4+/5 strength LLE and LUE, 3+/5 strength RLE, 4/5 strength RUE      ---  CONSULTANTS:   Neurology Dr August  ID Dr Mcmullen     ---    TIME SPENT:  I, the attending physician, was physically present for the key portions of the evaluation and management (E/M) service provided. The total amount of time spent reviewing the hospital notes, laboratory values, imaging findings, assessing/counseling the patient, discussing with consultant physicians, social work, nursing staff was 43 minutes    ---  Primary care provider was made aware of plan for discharge:      [  ] NO     [ x ] YES     FROM ADMISSION H+P:   HPI:  82 yo F PMHx hypothyroidism, irritable bowel syndrome presenting from home with weakness. Of note, patient admitted to [facility] from 9/7-13 for COVID pneumonia. Patient was discharged home. Patient is a limited historian, responding "I'm confused" to most questions. Further history obtained from patient's son, Adriel. She lives at home with her son. Patient's son, Adriel, states patient is a  and works full time. Patient is independent and does not use any assistive devices. Patient's son states she has had difficulty walking up the stairs in the house and requires assistance from son to walk around the house. Patient's son states he noticed weakness in her right arm, endorsing that patient developed loss of strength in her right arm last night. Patient denies loss of sensation. Patient states she was standing in the living room when she fell onto the couch because she felt weak. Denies trauma or injury. Admits weakness, loss of strength in RUE. She states that her right hand weakness has been ongoing from one day prior to arrival. Denies chest pain, shortness of breath, headache, dizziness, paresthesias, numbness/tingling. Of note, son states that they never picked up the xarelto. They were unaware it was sent to the pharmacy to start as a home medication and states that the pharmacy only had decadron waiting for them.    ED course:  Vitals: T 98 HR 83 /71 RR 16 SpO2 97% on RA  Labs: WBC 11.85, Plt 454, Neut 10.55 (89.1%), Lymph 0.68, Glu 176, Alb 2.8, Alk phos 134, AST 40, , TSH 0.10  CT head: Fluid levels in the left frontal sinus and right maxillary sinus due to acute sinusitis in the appropriate clinical setting. Chronic sphenoid sinusitis. No acute intracranial bleeding. Mild chronic microvascular ischemic changes.  CXR: bilateral infiltrates  EKG: NSR @69 bpm, LAD, incomplete RBBB (16 Sep 2021 00:35)      ---  HOSPITAL COURSE: Patient admitted for rule out CVA. Patient given full dose aspirin and started on high dose lipitor. B12/folate were normal/high. elevated triglycerides, LDL near goal (74), A1C is 6.3, B12 and folate normal/high, TSH low, T3 low. Would recommend further workup outpatient given pt was acutely ill. TTE showed EF 55-60%. Carotid US showed no significant stenosis. PT and OT evaluated pt and recommended rehab. NIHSS found to be 4, patient not a candidate for tPA given time frame. MRI showed acute CVA. Neurology recommended permissive HTN for 48 hours without needing midodrine. Infectious disease consulted because of patient's recent hospitalization due to Covid-19 infection. Patient followed and monitored closely by ID. Patient c/o constipation 2/2 known history of irritable bowel syndrome. Patient experienced no relief with multiple enemas, patient was manually disimpacted with resolution. Upon reassessment by PT, patient showed substantial improvement and recommendation changed for patient to be discharged home with home PT. COVID PCR positive during asymptomatic screening upon discharge. Patient to be discharged home with HCPT. During this admission, leukocytosis noted; per ID, likely secondary to recent steroid use. Upon discharge, WBC count still elevated but downtrending. Patient to follow up with PCP outpatient within one week to monitor.  Patient was medically optimized and improved clinically throughout hospital course. Patient seen and examined on day of discharge. Patient is medically stable for discharge to home with home care.    T(C): 36.8 (09-22-21 @ 05:49), Max: 37 (09-21-21 @ 13:35)  HR: 66 (09-22-21 @ 05:49) (66 - 78)  BP: 99/64 (09-22-21 @ 05:49) (86/56 - 99/64)  RR: 18 (09-22-21 @ 05:49) (17 - 18)  SpO2: 93% (09-22-21 @ 05:49) (91% - 93%)    PHYSICAL EXAM:  GENERAL: NAD  HEENT:  anicteric, moist mucous membranes  CHEST/LUNG:  CTA b/l, no rales, wheezes, or rhonchi  HEART:  RRR, S1, S2  ABDOMEN:  BS+, soft, nontender, mildly distended  EXTREMITIES: no edema, cyanosis, or calf tenderness  NERVOUS SYSTEM: answers questions and follows commands appropriately, intact sensation all 4 extremities, 4+/5 strength LLE and LUE, 3+/5 strength RLE, 4/5 strength RUE      ---  CONSULTANTS:   Neurology Dr Mariangel Mcmullen     ---    TIME SPENT:  I, the attending physician, was physically present for the key portions of the evaluation and management (E/M) service provided. The total amount of time spent reviewing the hospital notes, laboratory values, imaging findings, assessing/counseling the patient, discussing with consultant physicians, social work, nursing staff was 43 minutes    ---  Primary care provider was made aware of plan for discharge:      [  ] NO     [ x ] YES

## 2021-09-21 NOTE — PROGRESS NOTE ADULT - TIME BILLING
Reviewing medical record including consultant recommendations, labs, vitals, medications, orders, imaging, and discussion of plan of care with patient, consultants, nursing.
Reviewing medical record including consultant recommendations, labs, vitals, medications, orders, imaging, and discussion of plan of care with patient, son, consultants, nursing.
Reviewing medical record including consultant recommendations, labs, vitals, medications, orders, imaging, and discussion of plan of care with patient, consultants, nursing.
Reviewing medical record including consultant recommendations, labs, vitals, medications, orders, imaging, and discussion of plan of care with patient, consultants, nursing.
Reviewing medical record including consultant recommendations, labs, vitals, medications, orders, imaging, and discussion of plan of care with patient, son, consultants, nursing.

## 2021-09-21 NOTE — DISCHARGE NOTE PROVIDER - NSDCCPCAREPLAN_GEN_ALL_CORE_FT
PRINCIPAL DISCHARGE DIAGNOSIS  Diagnosis: Weakness  Assessment and Plan of Treatment:        PRINCIPAL DISCHARGE DIAGNOSIS  Diagnosis: Weakness  Assessment and Plan of Treatment: You came to the emergency room with weakenss and were found to hace a stroke. A small blood clot in your brain briefly caused you to have the weakness. If you have concerning symptoms like these again you should head to closest emergency room or call 911. You should follow stroke prevention lifestyle by excercising, keeping your blood pressure and blood sugar well controled, and taking all medications as prescribed by your primary care doctor. TAKE A BABY ASPIRIN 81MG EVERY DAY. TAKE YOUR STATIN EVERY DAY AS PRESCRIBED.   Follow up with your primary care doctor within 1 week of discharge.      SECONDARY DISCHARGE DIAGNOSES  Diagnosis: Irritable bowel syndrome  Assessment and Plan of Treatment:     Diagnosis: Hypothyroidism  Assessment and Plan of Treatment: You were previously diagnosed with hypothyroidism. Please continue to take your synthroid as prescribed and follow up with your primary care doctor and/or endocrinologist for further monitoring of your thyroid levels.     PRINCIPAL DISCHARGE DIAGNOSIS  Diagnosis: Weakness  Assessment and Plan of Treatment: You came to the emergency room with weakenss and were found to hace a stroke. A small blood clot in your brain briefly caused you to have the weakness. If you have concerning symptoms like these again you should head to closest emergency room or call 911. You should follow stroke prevention lifestyle by excercising, keeping your blood pressure and blood sugar well controled, and taking all medications as prescribed by your primary care doctor. TAKE A ASPIRIN 325MG FOR 2 WEEKS (UNTIL 9/30/21) AND THEN SWITCH TO BABY ASPIRIN 81MG EVERY DAY. TAKE YOUR STATIN EVERY DAY AS PRESCRIBED.   Follow up with your primary care doctor within 1 week of discharge.      SECONDARY DISCHARGE DIAGNOSES  Diagnosis: Hypothyroidism  Assessment and Plan of Treatment: You were previously diagnosed with hypothyroidism. Please continue to take your synthroid as prescribed and follow up with your primary care doctor and/or endocrinologist for further monitoring of your thyroid levels.    Diagnosis: Irritable bowel syndrome  Assessment and Plan of Treatment: Continue home bowel regimen     PRINCIPAL DISCHARGE DIAGNOSIS  Diagnosis: Weakness  Assessment and Plan of Treatment: You came to the emergency room with weakenss and were found to hace a stroke. A small blood clot in your brain briefly caused you to have the weakness. If you have concerning symptoms like these again you should head to closest emergency room or call 911. You should follow stroke prevention lifestyle by excercising, keeping your blood pressure and blood sugar well controled, and taking all medications as prescribed by your primary care doctor. TAKE A ASPIRIN 325MG FOR 2 WEEKS (UNTIL 9/30/21) AND THEN SWITCH TO BABY ASPIRIN 81MG EVERY DAY. TAKE YOUR STATIN EVERY DAY AS PRESCRIBED.   Follow up with your primary care doctor and your neurologist within 1 week of discharge.      SECONDARY DISCHARGE DIAGNOSES  Diagnosis: Hypothyroidism  Assessment and Plan of Treatment: You were previously diagnosed with hypothyroidism. Your TSH and T3 levels were found to be low. Please continue to take your synthroid as prescribed and follow up with your primary care doctor and/or endocrinologist for further monitoring of your thyroid levels.    Diagnosis: Irritable bowel syndrome  Assessment and Plan of Treatment: Continue home bowel regimen    Diagnosis: Leukocytosis  Assessment and Plan of Treatment: During your hospital stay, your white blood cell count was elevated. There was no evidence of active infection. This count was likely elevated because of your steroid use for your Covid-19 infection. It is very important that you follow up with your primary care doctor within one week in order to get bloodwork.     PRINCIPAL DISCHARGE DIAGNOSIS  Diagnosis: Weakness  Assessment and Plan of Treatment: You came to the emergency room with weakenss and were found to hace a stroke. A small blood clot in your brain briefly caused you to have the weakness. If you have concerning symptoms like these again you should head to closest emergency room or call 911. You should follow stroke prevention lifestyle by excercising, keeping your blood pressure and blood sugar well controled, and taking all medications as prescribed by your primary care doctor. TAKE A ASPIRIN 325MG FOR 2 WEEKS (UNTIL 9/30/21) AND THEN SWITCH TO BABY ASPIRIN 81MG EVERY DAY. TAKE XARELTO 10MG STARTING SEPTEMBER 23RD FOR 22 DAYS (LAST DOSE 10/14). TAKE YOUR STATIN EVERY DAY AS PRESCRIBED.   Follow up with your primary care doctor and your neurologist within 1 week of discharge.      SECONDARY DISCHARGE DIAGNOSES  Diagnosis: Hypothyroidism  Assessment and Plan of Treatment: You were previously diagnosed with hypothyroidism. Your TSH and T3 levels were found to be low. Please continue to take your synthroid as prescribed and follow up with your primary care doctor and/or endocrinologist for further monitoring of your thyroid levels.    Diagnosis: Irritable bowel syndrome  Assessment and Plan of Treatment: Continue home bowel regimen    Diagnosis: Leukocytosis  Assessment and Plan of Treatment: During your hospital stay, your white blood cell count was elevated. There was no evidence of active infection. This count was likely elevated because of your steroid use for your Covid-19 infection. It is very important that you follow up with your primary care doctor within one week in order to get bloodwork.     PRINCIPAL DISCHARGE DIAGNOSIS  Diagnosis: Weakness  Assessment and Plan of Treatment: You came to the emergency room with weakenss and were found to hace a stroke. A small blood clot in your brain briefly caused you to have the weakness. If you have concerning symptoms like these again you should head to closest emergency room or call 911. You should follow stroke prevention lifestyle by excercising, keeping your blood pressure and blood sugar well controled, and taking all medications as prescribed by your primary care doctor. TAKE A ASPIRIN 325MG FOR 2 WEEKS (UNTIL 9/30/21) AND THEN SWITCH TO BABY ASPIRIN 81MG EVERY DAY.  TAKE YOUR STATIN EVERY DAY AS PRESCRIBED.   Follow up with your primary care doctor and your neurologist within 1 week of discharge.      SECONDARY DISCHARGE DIAGNOSES  Diagnosis: Hypothyroidism  Assessment and Plan of Treatment: You were previously diagnosed with hypothyroidism. Your TSH and T3 levels were found to be low. Please continue to take your synthroid as prescribed and follow up with your primary care doctor and/or endocrinologist for further monitoring of your thyroid levels.    Diagnosis: COVID-19  Assessment and Plan of Treatment: For your recent COVID infection, please TAKE XARELTO 10MG STARTING SEPTEMBER 23RD FOR 22 DAYS (LAST DOSE 10/14).    Diagnosis: Irritable bowel syndrome  Assessment and Plan of Treatment: Continue home bowel regimen    Diagnosis: Leukocytosis  Assessment and Plan of Treatment: During your hospital stay, your white blood cell count was elevated. There was no evidence of active infection. This count was likely elevated because of your steroid use for your Covid-19 infection. It is very important that you follow up with your primary care doctor within one week in order to get bloodwork.

## 2021-09-21 NOTE — PROGRESS NOTE ADULT - PROBLEM SELECTOR PROBLEM 5
Irritable bowel syndrome

## 2021-09-21 NOTE — DISCHARGE NOTE PROVIDER - NSDCMRMEDTOKEN_GEN_ALL_CORE_FT
Breo Ellipta 100 mcg-25 mcg/inh inhalation powder: 1 puff(s) inhaled once a day  dexamethasone 6 mg oral tablet: 1 tab(s) orally once a day   DHEA: 1 tab(s) orally once a day  Home O2 concentrator and portable: 2L Nasal cannula continuous  ICD10 CODE U07.1 COVID-19  COLLEEN  6 Months  Lasix 40 mg oral tablet: 1 tab(s) orally once a day  Linzess 290 mcg oral capsule: 1 cap(s) orally once a day  meloxicam 15 mg oral tablet: 1 tab(s) orally once a day  multivitamin: 1 tab(s) orally once a day  ondansetron 4 mg oral tablet: 1 tab(s) orally once a day  Potassium Chloride (Eqv-Klor-Con M20) 20 mEq oral tablet, extended release: 1 tab(s) orally 3 times a day  predniSONE 20 mg oral tablet: 1 tab(s) orally once a day, As Needed    *As per patient, she uses this therapy for allergy exacerbation, only when needed.  Synthroid 100 mcg (0.1 mg) oral tablet: 1 tab(s) orally once a day  Xarelto 10 mg oral tablet: 1 tab(s) orally once a day    *Last filled 09/14/21 #30. Not picked up.*   aspirin 325 mg oral delayed release tablet: 1 tab(s) orally once a day  Aspirin Low Dose 81 mg oral delayed release tablet: 1 tab(s) orally once a day, START 10/1/21  atorvastatin 80 mg oral tablet: 1 tab(s) orally once a day (at bedtime)  Breo Ellipta 100 mcg-25 mcg/inh inhalation powder: 1 puff(s) inhaled once a day  DHEA: 1 tab(s) orally once a day  guaiFENesin 100 mg/5 mL oral liquid: 10 milliliter(s) orally every 6 hours, As needed, Cough  lactulose 10 g/15 mL oral syrup: 20 gram(s) orally 3 times a day  Lasix 40 mg oral tablet: 1 tab(s) orally once a day  Linzess 290 mcg oral capsule: 1 cap(s) orally once a day  meloxicam 15 mg oral tablet: 1 tab(s) orally once a day  multivitamin: 1 tab(s) orally once a day  ondansetron 4 mg oral tablet: 1 tab(s) orally once a day  Potassium Chloride (Eqv-Klor-Con M20) 20 mEq oral tablet, extended release: 1 tab(s) orally 3 times a day  Sarah Rodriguez. ICD R26.9:   Synthroid 100 mcg (0.1 mg) oral tablet: 1 tab(s) orally once a day   aspirin 325 mg oral delayed release tablet: 1 tab(s) orally once a day  Aspirin Low Dose 81 mg oral delayed release tablet: 1 tab(s) orally once a day, START 10/1/21  atorvastatin 80 mg oral tablet: 1 tab(s) orally once a day (at bedtime)  Breo Ellipta 100 mcg-25 mcg/inh inhalation powder: 1 puff(s) inhaled once a day  DHEA: 1 tab(s) orally once a day  guaiFENesin 100 mg/5 mL oral liquid: 10 milliliter(s) orally every 6 hours, As needed, Cough  lactulose 10 g/15 mL oral syrup: 20 gram(s) orally 3 times a day  Lasix 40 mg oral tablet: 1 tab(s) orally once a day  Linzess 290 mcg oral capsule: 1 cap(s) orally once a day  meloxicam 15 mg oral tablet: 1 tab(s) orally once a day  multivitamin: 1 tab(s) orally once a day  ondansetron 4 mg oral tablet: 1 tab(s) orally once a day  Potassium Chloride (Eqv-Klor-Con M20) 20 mEq oral tablet, extended release: 1 tab(s) orally 3 times a day  Sarah Rodriguez. ICD R26.9:   Synthroid 100 mcg (0.1 mg) oral tablet: 1 tab(s) orally once a day  Xarelto 10 mg oral tablet: 1 tab(s) orally once a day

## 2021-09-21 NOTE — DISCHARGE NOTE NURSING/CASE MANAGEMENT/SOCIAL WORK - NSDCPEPTSTRK_GEN_ALL_CORE
Call 911 for stroke/Need for follow up after discharge/Prescribed medications/Risk factors for stroke/Stroke education booklet/Stroke support groups for patients, families, and friends/Stroke warning signs and symptoms/Signs and symptoms of stroke
Call 911 for stroke/Need for follow up after discharge/Prescribed medications/Risk factors for stroke/Stroke education booklet/Stroke support groups for patients, families, and friends/Stroke warning signs and symptoms/Signs and symptoms of stroke

## 2021-09-21 NOTE — PROGRESS NOTE ADULT - PROBLEM SELECTOR PLAN 2
Patient presents with weakness and s/p fall onto couch  - CT head: Fluid levels in the left frontal sinus and right maxillary sinus due to acute sinusitis in the appropriate clinical setting. Chronic sphenoid sinusitis. No acute intracranial bleeding. Mild chronic microvascular ischemic changes.  - Fall precautions  - PT eval: acute rehab recommended.  - Right hand pain: Xray negative for fracture, but demonstrates severe degenerative disease.

## 2021-09-21 NOTE — PROGRESS NOTE ADULT - PROBLEM SELECTOR PLAN 3
Pt admitted to [facility] from 9/7-13 for COVID pneumonia.  - COVID-19 PCR negative on admission 9/16  - Pt s/p Remdesivir and course of meropenem  - Completed course of Decadron   - O2 support as needed  - Patient tested positive for COVID on discharge screen 9/20, discussed with ID, unlikely active infection as patient asymptomatic and is >10 days since first positive test, so no need to isolate at this time  - will check COVID PCR in AM to help with obtaining home care PT
Pt admitted to [facility] from 9/7-13 for COVID pneumonia.  - repeat COVID-19 PCR negative  - Pt s/p Remdesivir and course of meropenem  - Continue course of Decadron for total 10 days, last day 9/18  - O2 support as needed
Pt admitted to [facility] from 9/7-13 for COVID pneumonia.  - COVID-19 PCR negative on admission 9/16  - Pt s/p Remdesivir and course of meropenem  - Completed course of Decadron   - O2 support as needed  - Patient teste positive for COVID on discharge screen today, discussed with ID, unlikely active infection as patient asymptomatic and is >10 days since first positive test, so no need to isolate at this time
Pt admitted to [facility] from 9/7-13 for COVID pneumonia.  - repeat COVID-19 PCR negative  - Pt s/p Remdesivir and course of meropenem  - Completed course of Decadron   - O2 support as needed
Pt admitted to [facility] from 9/7-13 for COVID pneumonia.  - repeat COVID-19 PCR negative  - Pt s/p Remdesivir and course of meropenem  - Continue course of Decadron for total 10 days, last day 9/18  - O2 support as needed

## 2021-09-21 NOTE — PROGRESS NOTE ADULT - PROBLEM SELECTOR PROBLEM 1
Acute cerebrovascular accident (CVA)
Weakness of right upper extremity
Acute cerebrovascular accident (CVA)

## 2021-09-21 NOTE — DISCHARGE NOTE NURSING/CASE MANAGEMENT/SOCIAL WORK - PATIENT PORTAL LINK FT
You can access the FollowMyHealth Patient Portal offered by [facility] by registering at the following website: [email or web address]. By joining [employer]’s FollowMyHealth portal, you will also be able to view your health information using other applications (apps) compatible with our system.
You can access the FollowMyHealth Patient Portal offered by [facility] by registering at the following website: [email or web address]. By joining [employer]’s FollowMyHealth portal, you will also be able to view your health information using other applications (apps) compatible with our system.

## 2021-09-21 NOTE — DISCHARGE NOTE PROVIDER - NSDCHHENCOUNTER_GEN_ALL_CORE
21-Sep-2021

## 2021-09-21 NOTE — PROGRESS NOTE ADULT - PROBLEM SELECTOR PLAN 1
- CT head: Fluid levels in the left frontal sinus and right maxillary sinus due to acute sinusitis in the appropriate clinical setting. Chronic sphenoid sinusitis. No acute intracranial bleeding. Mild chronic microvascular ischemic changes.  - Previously stated she could not have an MRI, so order was cancelled. Now confirms she actually has [employer] hardware, so MRI/MRA is now reordered- MRI showing new acute CVA  - Continue  mg x 2 weeks, and then 81mg daily afterward.  - Continue Lipitor 80 mg qhs  - Lipid profile shows elevated triglycerides, LDL near goal (74), A1C is 6.3, B12 and folate normal/high, TSH low, T3 low, will defer to PCP given acute issues.   - DVT ppx: Lovenox 40 mg subq qd  - Telemetry monitoring to r/o arrhythmia   - S&S recommending regular diet with thin liquids and aspiration precautions.  - Fall precautions  - F/u TTE- Ef 55-60%  - Carotid dopplers show no significant stenosis.   - Neurology (Dr. August) following.  - PT/OT recommending acute rehab initially, however, patient with considerable improvement- PT now recommending home with home care- discussed with son over phone who is agreeable with plan
- CT head: Fluid levels in the left frontal sinus and right maxillary sinus due to acute sinusitis in the appropriate clinical setting. Chronic sphenoid sinusitis. No acute intracranial bleeding. Mild chronic microvascular ischemic changes.  - Previously stated she could not have an MRI, so order was cancelled. Now confirms she actually has [employer] hardware, so MRI/MRA is now reordered- MRI showing new acute CVA  - Continue  mg x 2 weeks, and then 81mg daily afterward.  - Continue Lipitor 80 mg qhs  - Lipid profile shows elevated triglycerides, LDL near goal (74), A1C is 6.3, B12 and folate normal/high, TSH low, T3 low, will defer to PCP given acute issues.   - DVT ppx: Lovenox 40 mg subq qd  - Telemetry monitoring to r/o arrhythmia   - S&S recommending regular diet with thin liquids and aspiration precautions.  - Fall precautions  - F/u TTE  - Carotid dopplers show no significant stenosis.   - Neurology (Dr. August) following.  - PT/OT recommending acute rehab.
- CT head: Fluid levels in the left frontal sinus and right maxillary sinus due to acute sinusitis in the appropriate clinical setting. Chronic sphenoid sinusitis. No acute intracranial bleeding. Mild chronic microvascular ischemic changes.  - Previously stated she could not have an MRI, so order was cancelled. Now confirms she actually has [employer] hardware, so MRI/MRA is now reordered- MRI showing new acute CVA  - Continue  mg x 2 weeks, and then 81mg daily afterward.  - Continue Lipitor 80 mg qhs  - Lipid profile shows elevated triglycerides, LDL near goal (74), A1C is 6.3, B12 and folate normal/high, TSH low, T3 low, will defer to PCP given acute issues.   - DVT ppx: Lovenox 40 mg subq qd  - Telemetry monitoring to r/o arrhythmia   - S&S recommending regular diet with thin liquids and aspiration precautions.  - Fall precautions  - F/u TTE  - Carotid dopplers show no significant stenosis.   - Neurology (Dr. August) following.  - PT/OT recommending acute rehab.
- CT head: Fluid levels in the left frontal sinus and right maxillary sinus due to acute sinusitis in the appropriate clinical setting. Chronic sphenoid sinusitis. No acute intracranial bleeding. Mild chronic microvascular ischemic changes.  - Previously stated she could not have an MRI, so order was cancelled. Now confirms she actually has [employer] hardware, so MRI/MRA is now reordered and pending.   - Continue  mg x 2 weeks, and then 81mg daily afterward.  - Continue Lipitor 80 mg qhs  - Lipid profile shows elevated triglycerides, LDL near goal (74), A1C is 6.3, B12 and folate normal/high, TSH low, T3 low, will defer to PCP given acute issues.   - DVT ppx: Lovenox 40 mg subq qd  - Telemetry monitoring to r/o arrhythmia   - S&S recommending regular diet with thin liquids and aspiration precautions.  - Fall precautions  - F/u TTE  - Carotid dopplers show no significant stenosis.   - Neurology (Dr. August) following.  - PT/OT recommending acute rehab.
- CT head: Fluid levels in the left frontal sinus and right maxillary sinus due to acute sinusitis in the appropriate clinical setting. Chronic sphenoid sinusitis. No acute intracranial bleeding. Mild chronic microvascular ischemic changes.  - Previously stated she could not have an MRI, so order was cancelled. Now confirms she actually has [employer] hardware, so MRI/MRA is now reordered- MRI showing new acute CVA  - Continue  mg x 2 weeks, and then 81mg daily afterward.  - Continue Lipitor 80 mg qhs  - Lipid profile shows elevated triglycerides, LDL near goal (74), A1C is 6.3, B12 and folate normal/high, TSH low, T3 low, will defer to PCP given acute issues.   - DVT ppx: Lovenox 40 mg subq qd  - Telemetry monitoring to r/o arrhythmia   - S&S recommending regular diet with thin liquids and aspiration precautions.  - Fall precautions  - F/u TTE  - Carotid dopplers show no significant stenosis.   - Neurology (Dr. August) following.  - PT/OT recommending acute rehab.

## 2021-09-22 VITALS
HEART RATE: 75 BPM | TEMPERATURE: 98 F | SYSTOLIC BLOOD PRESSURE: 125 MMHG | RESPIRATION RATE: 18 BRPM | OXYGEN SATURATION: 93 % | DIASTOLIC BLOOD PRESSURE: 72 MMHG

## 2021-09-22 LAB
HCT VFR BLD CALC: 37.1 % — SIGNIFICANT CHANGE UP (ref 34.5–45)
HGB BLD-MCNC: 11.9 G/DL — SIGNIFICANT CHANGE UP (ref 11.5–15.5)
MCHC RBC-ENTMCNC: 30.6 PG — SIGNIFICANT CHANGE UP (ref 27–34)
MCHC RBC-ENTMCNC: 32.1 GM/DL — SIGNIFICANT CHANGE UP (ref 32–36)
MCV RBC AUTO: 95.4 FL — SIGNIFICANT CHANGE UP (ref 80–100)
NRBC # BLD: 0 /100 WBCS — SIGNIFICANT CHANGE UP (ref 0–0)
PLATELET # BLD AUTO: 310 K/UL — SIGNIFICANT CHANGE UP (ref 150–400)
RBC # BLD: 3.89 M/UL — SIGNIFICANT CHANGE UP (ref 3.8–5.2)
RBC # FLD: 12.3 % — SIGNIFICANT CHANGE UP (ref 10.3–14.5)
SARS-COV-2 RNA SPEC QL NAA+PROBE: DETECTED
WBC # BLD: 13.12 K/UL — HIGH (ref 3.8–10.5)
WBC # FLD AUTO: 13.12 K/UL — HIGH (ref 3.8–10.5)

## 2021-09-22 PROCEDURE — 87086 URINE CULTURE/COLONY COUNT: CPT

## 2021-09-22 PROCEDURE — 87186 SC STD MICRODIL/AGAR DIL: CPT

## 2021-09-22 PROCEDURE — 73130 X-RAY EXAM OF HAND: CPT

## 2021-09-22 PROCEDURE — 87635 SARS-COV-2 COVID-19 AMP PRB: CPT

## 2021-09-22 PROCEDURE — 85027 COMPLETE CBC AUTOMATED: CPT

## 2021-09-22 PROCEDURE — 85730 THROMBOPLASTIN TIME PARTIAL: CPT

## 2021-09-22 PROCEDURE — 97530 THERAPEUTIC ACTIVITIES: CPT

## 2021-09-22 PROCEDURE — U0005: CPT

## 2021-09-22 PROCEDURE — 97166 OT EVAL MOD COMPLEX 45 MIN: CPT

## 2021-09-22 PROCEDURE — 85025 COMPLETE CBC W/AUTO DIFF WBC: CPT

## 2021-09-22 PROCEDURE — 83036 HEMOGLOBIN GLYCOSYLATED A1C: CPT

## 2021-09-22 PROCEDURE — 71045 X-RAY EXAM CHEST 1 VIEW: CPT

## 2021-09-22 PROCEDURE — 70450 CT HEAD/BRAIN W/O DYE: CPT | Mod: MA

## 2021-09-22 PROCEDURE — 97110 THERAPEUTIC EXERCISES: CPT

## 2021-09-22 PROCEDURE — 84480 ASSAY TRIIODOTHYRONINE (T3): CPT

## 2021-09-22 PROCEDURE — 97116 GAIT TRAINING THERAPY: CPT

## 2021-09-22 PROCEDURE — 86769 SARS-COV-2 COVID-19 ANTIBODY: CPT

## 2021-09-22 PROCEDURE — 93306 TTE W/DOPPLER COMPLETE: CPT

## 2021-09-22 PROCEDURE — 84443 ASSAY THYROID STIM HORMONE: CPT

## 2021-09-22 PROCEDURE — 82607 VITAMIN B-12: CPT

## 2021-09-22 PROCEDURE — 70551 MRI BRAIN STEM W/O DYE: CPT

## 2021-09-22 PROCEDURE — 80048 BASIC METABOLIC PNL TOTAL CA: CPT

## 2021-09-22 PROCEDURE — 97162 PT EVAL MOD COMPLEX 30 MIN: CPT

## 2021-09-22 PROCEDURE — 99239 HOSP IP/OBS DSCHRG MGMT >30: CPT

## 2021-09-22 PROCEDURE — 36415 COLL VENOUS BLD VENIPUNCTURE: CPT

## 2021-09-22 PROCEDURE — 80061 LIPID PANEL: CPT

## 2021-09-22 PROCEDURE — 82746 ASSAY OF FOLIC ACID SERUM: CPT

## 2021-09-22 PROCEDURE — U0003: CPT

## 2021-09-22 PROCEDURE — 80053 COMPREHEN METABOLIC PANEL: CPT

## 2021-09-22 PROCEDURE — 93005 ELECTROCARDIOGRAM TRACING: CPT

## 2021-09-22 PROCEDURE — 99285 EMERGENCY DEPT VISIT HI MDM: CPT | Mod: 25

## 2021-09-22 PROCEDURE — 92610 EVALUATE SWALLOWING FUNCTION: CPT

## 2021-09-22 PROCEDURE — 83735 ASSAY OF MAGNESIUM: CPT

## 2021-09-22 PROCEDURE — 84436 ASSAY OF TOTAL THYROXINE: CPT

## 2021-09-22 PROCEDURE — 84484 ASSAY OF TROPONIN QUANT: CPT

## 2021-09-22 PROCEDURE — 97535 SELF CARE MNGMENT TRAINING: CPT

## 2021-09-22 PROCEDURE — 93880 EXTRACRANIAL BILAT STUDY: CPT

## 2021-09-22 PROCEDURE — 84439 ASSAY OF FREE THYROXINE: CPT

## 2021-09-22 PROCEDURE — 85610 PROTHROMBIN TIME: CPT

## 2021-09-22 PROCEDURE — 81001 URINALYSIS AUTO W/SCOPE: CPT

## 2021-09-22 PROCEDURE — 82962 GLUCOSE BLOOD TEST: CPT

## 2021-09-22 PROCEDURE — 97112 NEUROMUSCULAR REEDUCATION: CPT

## 2021-09-22 PROCEDURE — 70544 MR ANGIOGRAPHY HEAD W/O DYE: CPT

## 2021-09-22 PROCEDURE — 82550 ASSAY OF CK (CPK): CPT

## 2021-09-22 RX ORDER — RIVAROXABAN 15 MG-20MG
1 KIT ORAL
Qty: 22 | Refills: 0
Start: 2021-09-22 | End: 2021-10-13

## 2021-09-22 RX ADMIN — PANTOPRAZOLE SODIUM 40 MILLIGRAM(S): 20 TABLET, DELAYED RELEASE ORAL at 06:16

## 2021-09-22 RX ADMIN — Medication 100 MICROGRAM(S): at 06:16

## 2021-09-22 RX ADMIN — LINACLOTIDE 290 MICROGRAM(S): 145 CAPSULE, GELATIN COATED ORAL at 06:16

## 2021-09-22 RX ADMIN — LACTULOSE 20 GRAM(S): 10 SOLUTION ORAL at 06:16

## 2021-09-22 NOTE — CHART NOTE - NSCHARTNOTEFT_GEN_A_CORE
Patient seen and examined at bedside. Endorsed severe constipation. Patient informed of risks and benefits of the procedure and offered verbal consent for digital fecal disimpaction. All of the patient's questions and concerns were answered and addressed. Patient was rolled with assistance from nurse, and lubricant was use for digital fecal disimpaction. About 100 mL solid, semi formed and liquid stool with blood from hemorrhoid removed. The patient tolerated procedure well. If further disimpaction required, please order lidocaine lubricant for patient comfort.
Pt notes to have 4 beats of VTach. Pt asymptomatic and was asleep. Will order Mag and Phos for AM.

## 2021-09-22 NOTE — PROGRESS NOTE ADULT - PROVIDER SPECIALTY LIST ADULT
Infectious Disease
Neurology
Neurology
Hospitalist
Neurology
Hospitalist
Neurology
Infectious Disease
Hospitalist

## 2021-09-22 NOTE — PROGRESS NOTE ADULT - SUBJECTIVE AND OBJECTIVE BOX
Neurology follow up note    MICHEL [id number]      Interval History:    Patient feels right side stronger today     Allergies    Advil (Unknown)  allergic to food coloring (Unknown)  Ceclor (Other)  cephalosporins (Other)  ciprofloxacin (Other)  Compazine (Other)  food coloring red/blue, wheat, tide, morphine, ceclor, compazine, advil, mortim, iv contrast, shellfish, oregano, tomoatoes, pork, peas, mold/spores (Unknown)  morphine (Unknown)  Motrin (Unknown)  Originally Entered as [Rash] reaction to [oregano spice] (Unknown)  peanuts (Other)  pork (Unknown)  shellfish (Unknown)    Intolerances        MEDICATIONS    acetaminophen   Tablet .. 650 milliGRAM(s) Oral every 6 hours PRN  aspirin enteric coated 325 milliGRAM(s) Oral daily  atorvastatin 80 milliGRAM(s) Oral at bedtime  dexAMETHasone     Tablet 6 milliGRAM(s) Oral daily  enoxaparin Injectable 40 milliGRAM(s) SubCutaneous daily  guaiFENesin Oral Liquid (Sugar-Free) 200 milliGRAM(s) Oral every 6 hours PRN  lactulose Syrup 20 Gram(s) Oral three times a day  levothyroxine 100 MICROGram(s) Oral daily  linaclotide 290 MICROGram(s) Oral before breakfast  ondansetron Injectable 4 milliGRAM(s) IV Push every 8 hours PRN  pantoprazole    Tablet 40 milliGRAM(s) Oral before breakfast              Vital Signs Last 24 Hrs  T(C): 36.4 (17 Sep 2021 04:53), Max: 37 (16 Sep 2021 13:28)  T(F): 97.6 (17 Sep 2021 04:53), Max: 98.6 (16 Sep 2021 13:28)  HR: 64 (17 Sep 2021 04:53) (64 - 75)  BP: 117/60 (17 Sep 2021 04:53) (114/64 - 121/61)  BP(mean): --  RR: 18 (17 Sep 2021 04:53) (16 - 18)  SpO2: 92% (17 Sep 2021 04:53) (92% - 97%)      REVIEW OF SYSTEMS:  Constitutional:  No fever, chills, or night sweats.  Head:  No headache.  Eyes:  No double vision or blurry vision.  Ears:  No ringing in the ears.  Neck:  No neck pain.  Respiratory:  No shortness of breath.  Cardiovascular:  No chest pain.  Abdomen:  No nausea, vomiting, or abdominal pain.  Extremities/Neurological:  No numbness or tingling.  Musculoskeletal:  Occasional joint pain.  General:  Positive history of back pain.  Positive episode of right-sided weakness.    PHYSICAL EXAMINATION:  HEENT:  Head:  Normocephalic and atraumatic.  Eyes:  No scleral icterus.  Ears:  Hearing bilaterally intact.  NECK:  Supple.  RESPIRATORY:  Decreased breath sounds bilaterally.  CARDIOVASCULAR:  S1 and S2 heard.  ABDOMEN:  Soft and nontender.  EXTREMITIES:  No clubbing or cyanosis was noted.      NEUROLOGIC:  The patient is awake, alert, and oriented x3.  Extraocular movements were intact.  Full visual fields.  Able to say correct age and month.  Speech was fluent.  Intact bilateral nasolabial folds.  Motor:  Left upper was 4/5, right upper was 3/5, was able to maintain off the bed for 10 seconds, left lower 4-/5. right lower was 3+/5, was able to maintain off the bed for five seconds.  Sensory:  Bilateral upper and lower intact to light touch.  As per the patient, she feels that the right-sided weakness is getting better.            LABS:  CBC Full  -  ( 16 Sep 2021 05:21 )  WBC Count : 10.28 K/uL  RBC Count : 4.05 M/uL  Hemoglobin : 12.4 g/dL  Hematocrit : 37.1 %  Platelet Count - Automated : 362 K/uL  Mean Cell Volume : 91.6 fl  Mean Cell Hemoglobin : 30.6 pg  Mean Cell Hemoglobin Concentration : 33.4 gm/dL  Auto Neutrophil # : 7.85 K/uL  Auto Lymphocyte # : 1.34 K/uL  Auto Monocyte # : 0.99 K/uL  Auto Eosinophil # : 0.01 K/uL  Auto Basophil # : 0.01 K/uL  Auto Neutrophil % : 76.4 %  Auto Lymphocyte % : 13.0 %  Auto Monocyte % : 9.6 %  Auto Eosinophil % : 0.1 %  Auto Basophil % : 0.1 %    Urinalysis Basic - ( 16 Sep 2021 02:29 )    Color: Yellow / Appearance: Slightly Turbid / S.015 / pH: x  Gluc: x / Ketone: Negative  / Bili: Negative / Urobili: Negative   Blood: x / Protein: 100 / Nitrite: Negative   Leuk Esterase: Moderate / RBC: 0-2 /HPF / WBC 26-50   Sq Epi: x / Non Sq Epi: Few / Bacteria: Few          137  |  104  |  25<H>  ----------------------------<  94  4.2   |  28  |  0.44<L>    Ca    8.4<L>      16 Sep 2021 05:21  Mg     2.0         TPro  6.0  /  Alb  2.2<L>  /  TBili  0.6  /  DBili  x   /  AST  25  /  ALT  125<H>  /  AlkPhos  107      Hemoglobin A1C:   Lipid Panel  @ 12:17  Total Cholesterol, Serum 137  LDL --  Triglycerides 179    LIVER FUNCTIONS - ( 16 Sep 2021 05:21 )  Alb: 2.2 g/dL / Pro: 6.0 g/dL / ALK PHOS: 107 U/L / ALT: 125 U/L / AST: 25 U/L / GGT: x           Vitamin B12 Vitamin B12, Serum: 1259 pg/mL ( @ 12:16)    PT/INR - ( 15 Sep 2021 22:35 )   PT: 13.2 sec;   INR: 1.14 ratio         PTT - ( 15 Sep 2021 22:35 )  PTT:32.3 sec      RADIOLOGY    ANALYSIS AND PLAN:  This is a 81-year-old with an episode of right-sided weakness.  For episode of right-sided weakness, questionable TIA, rule out cerebrovascular accident versus possibly history of COVID infection with deconditioning.  The patient will not be a tPA candidate secondary to the time of onset of symptoms.  The patient's NIH stroke scale if this was a stroke would be 4.  Will recommend aspirin 325 once a day, and after two weeks can cut down to 81.  Will recommend high-dose statin.  MRI/MRA imaging of the brain if there are no contraindications.  Check echocardiogram.  Fall precautions.  Physical Therapy evaluation.  For history of hypothyroidism, adjustment of Synthroid as needed.  based no if MRI is possible plan next step     Spoke with the son, Adriel, at [phone]   he understands the reasoning and thought process.    Greater than 40 minutes of time was spent with the patient, plan of care, reviewing data, speaking to the family and multidisciplinary healthcare team with greater than 50% of that time in counseling and care coordination.    Thank you for the courtesy of this consultation.
Neurology follow up note    MICHEL [id number]      Interval History:    Patient feels ok no new complaints.    Allergies    Advil (Unknown)  allergic to food coloring (Unknown)  Ceclor (Other)  cephalosporins (Other)  ciprofloxacin (Other)  Compazine (Other)  food coloring red/blue, wheat, tide, morphine, ceclor, compazine, advil, mortim, iv contrast, shellfish, oregano, tomoatoes, pork, peas, mold/spores (Unknown)  morphine (Unknown)  Motrin (Unknown)  Originally Entered as [Rash] reaction to [oregano spice] (Unknown)  peanuts (Other)  pork (Unknown)  shellfish (Unknown)    Intolerances        MEDICATIONS    acetaminophen   Tablet .. 650 milliGRAM(s) Oral every 6 hours PRN  aspirin enteric coated 325 milliGRAM(s) Oral daily  atorvastatin 80 milliGRAM(s) Oral at bedtime  enoxaparin Injectable 40 milliGRAM(s) SubCutaneous daily  guaiFENesin Oral Liquid (Sugar-Free) 200 milliGRAM(s) Oral every 6 hours PRN  lactulose Syrup 20 Gram(s) Oral three times a day  levothyroxine 100 MICROGram(s) Oral daily  linaclotide 290 MICROGram(s) Oral before breakfast  ondansetron Injectable 4 milliGRAM(s) IV Push every 8 hours PRN  pantoprazole    Tablet 40 milliGRAM(s) Oral before breakfast              Vital Signs Last 24 Hrs  T(C): 36.9 (18 Sep 2021 04:51), Max: 36.9 (18 Sep 2021 04:51)  T(F): 98.4 (18 Sep 2021 04:51), Max: 98.4 (18 Sep 2021 04:51)  HR: 60 (18 Sep 2021 04:51) (60 - 84)  BP: 113/68 (18 Sep 2021 04:51) (109/67 - 143/61)  BP(mean): --  RR: 18 (18 Sep 2021 04:51) (18 - 18)  SpO2: 96% (18 Sep 2021 04:51) (96% - 99%)        REVIEW OF SYSTEMS:  Constitutional:  No fever, chills, or night sweats.  Head:  No headache.  Eyes:  No double vision or blurry vision.  Ears:  No ringing in the ears.  Neck:  No neck pain.  Respiratory:  No shortness of breath.  Cardiovascular:  No chest pain.  Abdomen:  No nausea, vomiting, or abdominal pain.  Extremities/Neurological:  No numbness or tingling.  Musculoskeletal:  Occasional joint pain.  General:  Positive history of back pain.  Positive episode of right-sided weakness.    PHYSICAL EXAMINATION:  HEENT:  Head:  Normocephalic and atraumatic.  Eyes:  No scleral icterus.  Ears:  Hearing bilaterally intact.  NECK:  Supple.  RESPIRATORY:  Decreased breath sounds bilaterally.  CARDIOVASCULAR:  S1 and S2 heard.  ABDOMEN:  Soft and nontender.  EXTREMITIES:  No clubbing or cyanosis was noted.     NEUROLOGIC:  The patient is awake, alert, and oriented x3.  Extraocular movements were intact.  Full visual fields.  Able to say correct age and month.  Speech was fluent.  Intact bilateral nasolabial folds.  Motor:  Left upper was 4/5, right upper was 3/5, was able to maintain off the bed for 10 seconds, left lower 4-/5. right lower was 3+/5, was able to maintain off the bed for five seconds.  Sensory:  Bilateral upper and lower intact to light touch.  As per the patient, she feels that the right-sided weakness is getting better.                 LABS:            Hemoglobin A1C:       Vitamin B12         RADIOLOGY        ANALYSIS AND PLAN:  This is a 81-year-old with an episode of right-sided weakness.  For episode of right-sided weakness, cerebrovascular accident versus possibly history of COVID infection   The patient will not be a tPA candidate secondary to the time of onset of symptoms.  The patient's NIH stroke scale if this was a stroke would be 4.  Will recommend aspirin 325 once a day, and after two weeks can cut down to 81.  Will recommend high-dose statin.  MRI/MRA imaging of the brain positive   Check echocardiogram.  Fall precautions.  Physical Therapy evaluation.  For history of hypothyroidism, adjustment of Synthroid as needed.  if possible keep sbp around 120 if needed add midodrine     Spoke with the son, Adriel, at [phone] 9/18  he understands the reasoning and thought process.    Greater than 40 minutes of time was spent with the patient, plan of care, reviewing data, speaking to the family and multidisciplinary healthcare team with greater than 50% of that time in counseling and care coordination.    Thank you for the courtesy of this consultation.
Neurology follow up note    MICHEL [id number]      Interval History:    Patient feels ok no new complaints.    Allergies    Advil (Unknown)  allergic to food coloring (Unknown)  Ceclor (Other)  cephalosporins (Other)  ciprofloxacin (Other)  Compazine (Other)  food coloring red/blue, wheat, tide, morphine, ceclor, compazine, advil, mortim, iv contrast, shellfish, oregano, tomoatoes, pork, peas, mold/spores (Unknown)  morphine (Unknown)  Motrin (Unknown)  Originally Entered as [Rash] reaction to [oregano spice] (Unknown)  peanuts (Other)  pork (Unknown)  shellfish (Unknown)    Intolerances        MEDICATIONS    acetaminophen   Tablet .. 650 milliGRAM(s) Oral every 6 hours PRN  aspirin enteric coated 325 milliGRAM(s) Oral daily  atorvastatin 80 milliGRAM(s) Oral at bedtime  enoxaparin Injectable 40 milliGRAM(s) SubCutaneous daily  guaiFENesin Oral Liquid (Sugar-Free) 200 milliGRAM(s) Oral every 6 hours PRN  lactulose Syrup 20 Gram(s) Oral three times a day  levothyroxine 100 MICROGram(s) Oral daily  linaclotide 290 MICROGram(s) Oral before breakfast  ondansetron Injectable 4 milliGRAM(s) IV Push every 8 hours PRN  pantoprazole    Tablet 40 milliGRAM(s) Oral before breakfast  senna 2 Tablet(s) Oral at bedtime              Vital Signs Last 24 Hrs  T(C): 36.9 (20 Sep 2021 04:50), Max: 36.9 (20 Sep 2021 04:50)  T(F): 98.4 (20 Sep 2021 04:50), Max: 98.4 (20 Sep 2021 04:50)  HR: 75 (20 Sep 2021 04:50) (75 - 90)  BP: 120/74 (20 Sep 2021 04:50) (109/78 - 126/74)  BP(mean): --  RR: 18 (20 Sep 2021 04:50) (18 - 20)  SpO2: 95% (20 Sep 2021 04:50) (90% - 95%)      REVIEW OF SYSTEMS:  Constitutional:  No fever, chills, or night sweats.  Head:  No headache.  Eyes:  No double vision or blurry vision.  Ears:  No ringing in the ears.  Neck:  No neck pain.  Respiratory:  No shortness of breath.  Cardiovascular:  No chest pain.  Abdomen:  No nausea, vomiting, or abdominal pain.  Extremities/Neurological:  No numbness or tingling.  Musculoskeletal:  Occasional joint pain.  General:  Positive history of back pain.  Positive episode of right-sided weakness.    PHYSICAL EXAMINATION:  HEENT:  Head:  Normocephalic and atraumatic.  Eyes:  No scleral icterus.  Ears:  Hearing bilaterally intact.  NECK:  Supple.  RESPIRATORY:  Decreased breath sounds bilaterally.  CARDIOVASCULAR:  S1 and S2 heard.  ABDOMEN:  Soft and nontender.  EXTREMITIES:  No clubbing or cyanosis was noted.     NEUROLOGIC:  The patient is awake, alert, and oriented x3.  Extraocular movements were intact.  Full visual fields.  Able to say correct age and month.  Speech was fluent.  Intact bilateral nasolabial folds.  Motor:  Left upper was 4/5, right upper was 3/5, was able to maintain off the bed for 10 seconds, left lower 4-/5. right lower was 3+/5, was able to maintain off the bed for five seconds.  Sensory:  Bilateral upper and lower intact to light touch.  As per the patient, she feels that the right-sided weakness is getting better.                 LABS:  CBC Full  -  ( 20 Sep 2021 08:14 )  WBC Count : 15.32 K/uL  RBC Count : 4.29 M/uL  Hemoglobin : 13.2 g/dL  Hematocrit : 40.2 %  Platelet Count - Automated : 343 K/uL  Mean Cell Volume : 93.7 fl  Mean Cell Hemoglobin : 30.8 pg  Mean Cell Hemoglobin Concentration : 32.8 gm/dL  Auto Neutrophil # : x  Auto Lymphocyte # : x  Auto Monocyte # : x  Auto Eosinophil # : x  Auto Basophil # : x  Auto Neutrophil % : x  Auto Lymphocyte % : x  Auto Monocyte % : x  Auto Eosinophil % : x  Auto Basophil % : x      09-20    139  |  104  |  20  ----------------------------<  89  4.5   |  29  |  0.59    Ca    8.2<L>      20 Sep 2021 08:14      Hemoglobin A1C:       Vitamin B12         RADIOLOGY      ANALYSIS AND PLAN:  This is a 81-year-old with an episode of right-sided weakness.  For episode of right-sided weakness, cerebrovascular accident versus possibly history of COVID infection   The patient will not be a tPA candidate secondary to the time of onset of symptoms.  The patient's NIH stroke scale if this was a stroke would be 4.  Will recommend aspirin 325 once a day, and after two weeks can cut down to 81.  Will recommend high-dose statin.  MRI/MRA imaging of the brain positive   Fall precautions.  Physical Therapy evaluation.  For history of hypothyroidism, adjustment of Synthroid as needed.  if possible keep sbp around 120 if needed add midodrine   monitor bowel movements as needed     Spoke with the son, Adriel, at [phone] 9/20  he understands the reasoning and thought process.    Greater than 40 minutes of time was spent with the patient, plan of care, reviewing data, speaking to the family and multidisciplinary healthcare team with greater than 50% of that time in counseling and care coordination.    Thank you for the courtesy of this consultation.    
[facility] DIVISION of INFECTIOUS DISEASE  Pradeep Mcmullen MD PhD, Hortensia Grewal MD, Sasha Ness MD, Meagan Patrick MD  and providing coverage with Lilly Galindo MD and Hailey Geller MD  Providing Infectious Disease Consultations at [facility]'s    Office# [phone] to schedule follow up appointments  Answering Service for urgent calls or New Consults [phone]  Cell# to text for urgent issues Pradeep Mcmullen [phone]     infectious diseases progress note:    MICHEL TRENT is a 81y y. o. Female patient    No concerning overnight events    Allergies    Advil (Unknown)  allergic to food coloring (Unknown)  Ceclor (Other)  cephalosporins (Other)  ciprofloxacin (Other)  Compazine (Other)  food coloring red/blue, wheat, tide, morphine, ceclor, compazine, advil, mortim, iv contrast, shellfish, oregano, tomoatoes, pork, peas, mold/spores (Unknown)  morphine (Unknown)  Motrin (Unknown)  Originally Entered as [Rash] reaction to [oregano spice] (Unknown)  peanuts (Other)  pork (Unknown)  shellfish (Unknown)    Intolerances        ANTIBIOTICS/RELEVANT:  antimicrobials    immunologic:    OTHER:  acetaminophen   Tablet .. 650 milliGRAM(s) Oral every 6 hours PRN  aspirin enteric coated 325 milliGRAM(s) Oral daily  atorvastatin 80 milliGRAM(s) Oral at bedtime  enoxaparin Injectable 40 milliGRAM(s) SubCutaneous daily  guaiFENesin Oral Liquid (Sugar-Free) 200 milliGRAM(s) Oral every 6 hours PRN  lactulose Syrup 20 Gram(s) Oral three times a day  levothyroxine 100 MICROGram(s) Oral daily  linaclotide 290 MICROGram(s) Oral before breakfast  ondansetron Injectable 4 milliGRAM(s) IV Push every 8 hours PRN  pantoprazole    Tablet 40 milliGRAM(s) Oral before breakfast      Objective:  Vital Signs Last 24 Hrs  T(C): 36.6 (19 Sep 2021 13:03), Max: 36.7 (18 Sep 2021 20:14)  T(F): 97.9 (19 Sep 2021 13:03), Max: 98.1 (18 Sep 2021 20:14)  HR: 81 (19 Sep 2021 13:03) (63 - 81)  BP: 126/74 (19 Sep 2021 13:03) (110/68 - 126/74)  BP(mean): --  RR: 18 (19 Sep 2021 13:03) (18 - 19)  SpO2: 90% (19 Sep 2021 13:03) (90% - 92%)    T(C): 36.6 (09-19-21 @ 13:03), Max: 37 (09-18-21 @ 13:04)  T(C): 36.6 (09-19-21 @ 13:03), Max: 37 (09-18-21 @ 13:04)  T(C): 36.6 (09-19-21 @ 13:03), Max: 37 (09-16-21 @ 13:28)    PHYSICAL EXAM:  HEENT: NC atraumatic  Neck: supple  Respiratory: no accessory muscle use, breathing comfortably  Cardiovascular: distant  Gastrointestinal: normal appearing, nondistended  Extremities: no clubbing, no cyanosis,        LABS:                          12.4   12.37 )-----------( 368      ( 19 Sep 2021 09:17 )             38.2       12.37 09-19 @ 09:17  13.53 09-18 @ 09:39  10.28 09-16 @ 05:21  11.85 09-15 @ 22:35  15.65 09-14 @ 08:11  15.39 09-13 @ 10:07      09-19    141  |  105  |  21  ----------------------------<  79  3.9   |  31  |  0.55    Ca    8.3<L>      19 Sep 2021 09:17        Creatinine, Serum: 0.55 mg/dL (09-19-21 @ 09:17)  Creatinine, Serum: 0.63 mg/dL (09-18-21 @ 09:39)  Creatinine, Serum: 0.44 mg/dL (09-16-21 @ 05:21)  Creatinine, Serum: 0.64 mg/dL (09-15-21 @ 22:35)  Creatinine, Serum: 0.43 mg/dL (09-14-21 @ 08:11)  Creatinine, Serum: 0.55 mg/dL (09-13-21 @ 10:07)                INFLAMMATORY MARKERS  Auto Neutrophil #: 7.85 K/uL (09-16-21 @ 05:21)  Auto Lymphocyte #: 1.34 K/uL (09-16-21 @ 05:21)  Auto Neutrophil #: 10.55 K/uL (09-15-21 @ 22:35)  Auto Lymphocyte #: 0.68 K/uL (09-15-21 @ 22:35)  Auto Neutrophil #: 15.51 K/uL (09-11-21 @ 08:39)  Auto Lymphocyte #: 0.97 K/uL (09-11-21 @ 08:39)  Auto Neutrophil #: 8.67 K/uL (09-10-21 @ 12:51)  Auto Lymphocyte #: 0.70 K/uL (09-10-21 @ 12:51)  Auto Neutrophil #: 8.44 K/uL (09-10-21 @ 09:07)  Auto Lymphocyte #: 0.73 K/uL (09-10-21 @ 09:07)  Auto Neutrophil #: 10.23 K/uL (09-09-21 @ 08:22)  Auto Lymphocyte #: 0.79 K/uL (09-09-21 @ 08:22)  Auto Neutrophil #: 6.11 K/uL (09-08-21 @ 11:20)  Auto Lymphocyte #: 0.87 K/uL (09-08-21 @ 11:20)  Auto Neutrophil #: 9.38 K/uL (09-07-21 @ 05:19)  Auto Lymphocyte #: 0.93 K/uL (09-07-21 @ 05:19)    Lactate, Blood: 1.1 mmol/L (09-07-21 @ 05:19)    Auto Eosinophil #: 0.01 K/uL (09-16-21 @ 05:21)  Auto Eosinophil #: 0.00 K/uL (09-15-21 @ 22:35)  Auto Eosinophil #: 0.01 K/uL (09-11-21 @ 08:39)  Auto Eosinophil #: 0.00 K/uL (09-10-21 @ 12:51)  Auto Eosinophil #: 0.00 K/uL (09-10-21 @ 09:07)  Auto Eosinophil #: 0.00 K/uL (09-09-21 @ 08:22)  Auto Eosinophil #: 0.01 K/uL (09-08-21 @ 11:20)  Auto Eosinophil #: 0.01 K/uL (09-07-21 @ 05:19)        Procalcitonin, Serum: 0.98 ng/mL (09-09-21 @ 08:22)    Troponin I, Serum: .038 ng/mL (09-15-21 @ 22:35)    Creatine Kinase, Serum: 36 U/L (09-15-21 @ 22:36)        Ferritin, Serum: 812 ng/mL (09-09-21 @ 12:20)        Activated Partial Thromboplastin Time: 32.3 sec (09-15-21 @ 22:35)  INR: 1.14 ratio (09-15-21 @ 22:35)  INR: 1.14 ratio (09-14-21 @ 08:11)  INR: 1.18 ratio (09-13-21 @ 10:07)  INR: 1.23 ratio (09-12-21 @ 09:21)  INR: 1.23 ratio (09-11-21 @ 08:39)  INR: 1.27 ratio (09-10-21 @ 09:07)  INR: 1.28 ratio (09-09-21 @ 08:22)  INR: 1.40 ratio (09-08-21 @ 17:29)  Activated Partial Thromboplastin Time: 40.6 sec (09-08-21 @ 17:29)  Activated Partial Thromboplastin Time: 34.1 sec (09-07-21 @ 05:19)  INR: 1.13 ratio (09-07-21 @ 05:19)    D-Dimer Assay, Quantitative: 246 ng/mL DDU (09-10-21 @ 09:07)  D-Dimer Assay, Quantitative: 231 ng/mL DDU (09-09-21 @ 08:22)        MICROBIOLOGY:              RADIOLOGY & ADDITIONAL STUDIES:  
Neurology follow up note    MICHEL [id number]      Interval History:    Patient feels ok  wants an Enema     Allergies    Advil (Unknown)  allergic to food coloring (Unknown)  Ceclor (Other)  cephalosporins (Other)  ciprofloxacin (Other)  Compazine (Other)  food coloring red/blue, wheat, tide, morphine, ceclor, compazine, advil, mortim, iv contrast, shellfish, oregano, tomoatoes, pork, peas, mold/spores (Unknown)  morphine (Unknown)  Motrin (Unknown)  Originally Entered as [Rash] reaction to [oregano spice] (Unknown)  peanuts (Other)  pork (Unknown)  shellfish (Unknown)    Intolerances        MEDICATIONS    acetaminophen   Tablet .. 650 milliGRAM(s) Oral every 6 hours PRN  aspirin enteric coated 325 milliGRAM(s) Oral daily  atorvastatin 80 milliGRAM(s) Oral at bedtime  enoxaparin Injectable 40 milliGRAM(s) SubCutaneous daily  guaiFENesin Oral Liquid (Sugar-Free) 200 milliGRAM(s) Oral every 6 hours PRN  lactulose Syrup 20 Gram(s) Oral three times a day  levothyroxine 100 MICROGram(s) Oral daily  linaclotide 290 MICROGram(s) Oral before breakfast  ondansetron Injectable 4 milliGRAM(s) IV Push every 8 hours PRN  pantoprazole    Tablet 40 milliGRAM(s) Oral before breakfast              Vital Signs Last 24 Hrs  T(C): 36.5 (19 Sep 2021 05:05), Max: 37 (18 Sep 2021 13:04)  T(F): 97.7 (19 Sep 2021 05:05), Max: 98.6 (18 Sep 2021 13:04)  HR: 63 (19 Sep 2021 05:05) (60 - 66)  BP: 124/73 (19 Sep 2021 05:05) (110/68 - 124/73)  BP(mean): --  RR: 19 (19 Sep 2021 05:05) (18 - 19)  SpO2: 92% (19 Sep 2021 05:05) (91% - 95%)      REVIEW OF SYSTEMS:  Constitutional:  No fever, chills, or night sweats.  Head:  No headache.  Eyes:  No double vision or blurry vision.  Ears:  No ringing in the ears.  Neck:  No neck pain.  Respiratory:  No shortness of breath.  Cardiovascular:  No chest pain.  Abdomen:  No nausea, vomiting, or abdominal pain.  Extremities/Neurological:  No numbness or tingling.  Musculoskeletal:  Occasional joint pain.  General:  Positive history of back pain.  Positive episode of right-sided weakness.    PHYSICAL EXAMINATION:  HEENT:  Head:  Normocephalic and atraumatic.  Eyes:  No scleral icterus.  Ears:  Hearing bilaterally intact.  NECK:  Supple.  RESPIRATORY:  Decreased breath sounds bilaterally.  CARDIOVASCULAR:  S1 and S2 heard.  ABDOMEN:  Soft and nontender.  EXTREMITIES:  No clubbing or cyanosis was noted.     NEUROLOGIC:  The patient is awake, alert, and oriented x3.  Extraocular movements were intact.  Full visual fields.  Able to say correct age and month.  Speech was fluent.  Intact bilateral nasolabial folds.  Motor:  Left upper was 4/5, right upper was 3/5, was able to maintain off the bed for 10 seconds, left lower 4-/5. right lower was 3+/5, was able to maintain off the bed for five seconds.  Sensory:  Bilateral upper and lower intact to light touch.  As per the patient, she feels that the right-sided weakness is getting better.                 LABS:  CBC Full  -  ( 18 Sep 2021 09:39 )  WBC Count : 13.53 K/uL  RBC Count : 4.28 M/uL  Hemoglobin : 13.1 g/dL  Hematocrit : 41.0 %  Platelet Count - Automated : 420 K/uL  Mean Cell Volume : 95.8 fl  Mean Cell Hemoglobin : 30.6 pg  Mean Cell Hemoglobin Concentration : 32.0 gm/dL  Auto Neutrophil # : x  Auto Lymphocyte # : x  Auto Monocyte # : x  Auto Eosinophil # : x  Auto Basophil # : x  Auto Neutrophil % : x  Auto Lymphocyte % : x  Auto Monocyte % : x  Auto Eosinophil % : x  Auto Basophil % : x      09-18    140  |  104  |  21  ----------------------------<  118<H>  3.6   |  30  |  0.63    Ca    8.7      18 Sep 2021 09:39      Hemoglobin A1C:       Vitamin B12         RADIOLOGY      ANALYSIS AND PLAN:  This is a 81-year-old with an episode of right-sided weakness.  For episode of right-sided weakness, cerebrovascular accident versus possibly history of COVID infection   The patient will not be a tPA candidate secondary to the time of onset of symptoms.  The patient's NIH stroke scale if this was a stroke would be 4.  Will recommend aspirin 325 once a day, and after two weeks can cut down to 81.  Will recommend high-dose statin.  MRI/MRA imaging of the brain positive   Fall precautions.  Physical Therapy evaluation.  For history of hypothyroidism, adjustment of Synthroid as needed.  if possible keep sbp around 120 if needed add midodrine   monitor bowel movements as needed     Spoke with the son, Adriel, at [phone] 9/19  he understands the reasoning and thought process.    Greater than 40 minutes of time was spent with the patient, plan of care, reviewing data, speaking to the family and multidisciplinary healthcare team with greater than 50% of that time in counseling and care coordination.    Thank you for the courtesy of this consultation.  
Neurology follow up note    MICHEL [id number]      Interval History:    Patient feels ok no new complaints.    Allergies    Advil (Unknown)  allergic to food coloring (Unknown)  Ceclor (Other)  cephalosporins (Other)  ciprofloxacin (Other)  Compazine (Other)  food coloring red/blue, wheat, tide, morphine, ceclor, compazine, advil, mortim, iv contrast, shellfish, oregano, tomoatoes, pork, peas, mold/spores (Unknown)  morphine (Unknown)  Motrin (Unknown)  Originally Entered as [Rash] reaction to [oregano spice] (Unknown)  peanuts (Other)  pork (Unknown)  shellfish (Unknown)    Intolerances        MEDICATIONS    acetaminophen   Tablet .. 650 milliGRAM(s) Oral every 6 hours PRN  aspirin enteric coated 325 milliGRAM(s) Oral daily  atorvastatin 80 milliGRAM(s) Oral at bedtime  enoxaparin Injectable 40 milliGRAM(s) SubCutaneous daily  guaiFENesin Oral Liquid (Sugar-Free) 200 milliGRAM(s) Oral every 6 hours PRN  hydrocortisone hemorrhoidal Suppository 1 Suppository(s) Rectal daily PRN  influenza   Vaccine 0.5 milliLiter(s) IntraMuscular once  lactulose Syrup 20 Gram(s) Oral three times a day  levothyroxine 100 MICROGram(s) Oral daily  lidocaine 5% Ointment 1 Application(s) Topical daily  linaclotide 290 MICROGram(s) Oral before breakfast  ondansetron Injectable 4 milliGRAM(s) IV Push every 8 hours PRN  pantoprazole    Tablet 40 milliGRAM(s) Oral before breakfast  senna 2 Tablet(s) Oral at bedtime              Vital Signs Last 24 Hrs  T(C): 36.8 (22 Sep 2021 05:49), Max: 37 (21 Sep 2021 13:35)  T(F): 98.2 (22 Sep 2021 05:49), Max: 98.6 (21 Sep 2021 13:35)  HR: 66 (22 Sep 2021 05:49) (66 - 78)  BP: 99/64 (22 Sep 2021 05:49) (86/56 - 99/64)  BP(mean): --  RR: 18 (22 Sep 2021 05:49) (17 - 18)  SpO2: 93% (22 Sep 2021 05:49) (91% - 93%)      REVIEW OF SYSTEMS:  Constitutional:  No fever, chills, or night sweats.  Head:  No headache.  Eyes:  No double vision or blurry vision.  Ears:  No ringing in the ears.  Neck:  No neck pain.  Respiratory:  No shortness of breath.  Cardiovascular:  No chest pain.  Abdomen:  No nausea, vomiting, or abdominal pain.  Extremities/Neurological:  No numbness or tingling.  Musculoskeletal:  Occasional joint pain.  General:  Positive history of back pain.  Positive episode of right-sided weakness.    PHYSICAL EXAMINATION:  HEENT:  Head:  Normocephalic and atraumatic.  Eyes:  No scleral icterus.  Ears:  Hearing bilaterally intact.  NECK:  Supple.  RESPIRATORY:  Decreased breath sounds bilaterally.  CARDIOVASCULAR:  S1 and S2 heard.  ABDOMEN:  Soft and nontender.  EXTREMITIES:  No clubbing or cyanosis was noted.     NEUROLOGIC:  The patient is awake, alert, and oriented x3.  Extraocular movements were intact.  Full visual fields.  Able to say correct age and month.  Speech was fluent.  Intact bilateral nasolabial folds.  Motor:  Left upper was 4/5, right upper was 3/5, was able to maintain off the bed for 10 seconds, left lower 4-/5. right lower was 3+/5, was able to maintain off the bed for five seconds.  Sensory:  Bilateral upper and lower intact to light touch.  As per the patient, she feels that the right-sided weakness is getting better.                    LABS:  CBC Full  -  ( 22 Sep 2021 08:29 )  WBC Count : 13.12 K/uL  RBC Count : 3.89 M/uL  Hemoglobin : 11.9 g/dL  Hematocrit : 37.1 %  Platelet Count - Automated : 310 K/uL  Mean Cell Volume : 95.4 fl  Mean Cell Hemoglobin : 30.6 pg  Mean Cell Hemoglobin Concentration : 32.1 gm/dL  Auto Neutrophil # : x  Auto Lymphocyte # : x  Auto Monocyte # : x  Auto Eosinophil # : x  Auto Basophil # : x  Auto Neutrophil % : x  Auto Lymphocyte % : x  Auto Monocyte % : x  Auto Eosinophil % : x  Auto Basophil % : x            Hemoglobin A1C:       Vitamin B12         RADIOLOGY      ANALYSIS AND PLAN:  This is a 81-year-old with an episode of right-sided weakness.  For episode of right-sided weakness, cerebrovascular accident versus possibly history of COVID infection   The patient will not be a tPA candidate secondary to the time of onset of symptoms.  The patient's NIH stroke scale if this was a stroke would be 4.  Will recommend aspirin 325 once a day, and after two weeks can cut down to 81.  Will recommend high-dose statin.  MRI/MRA imaging of the brain positive   Fall precautions.  Physical Therapy evaluation.  For history of hypothyroidism, adjustment of Synthroid as needed.  if possible keep sbp around 120 if needed add midodrine   monitor bowel movements as needed     Spoke with the son, Adriel, at [phone] 9/22  he understands the reasoning and thought process.    Greater than 40 minutes of time was spent with the patient, plan of care, reviewing data, speaking to the family and multidisciplinary healthcare team with greater than 50% of that time in counseling and care coordination.    Thank you for the courtesy of this consultation.    
Neurology follow up note    MICHEL [id number]      Interval History:    Patient feels ok no new complaints.    Allergies    Advil (Unknown)  allergic to food coloring (Unknown)  Ceclor (Other)  cephalosporins (Other)  ciprofloxacin (Other)  Compazine (Other)  food coloring red/blue, wheat, tide, morphine, ceclor, compazine, advil, mortim, iv contrast, shellfish, oregano, tomoatoes, pork, peas, mold/spores (Unknown)  morphine (Unknown)  Motrin (Unknown)  Originally Entered as [Rash] reaction to [oregano spice] (Unknown)  peanuts (Other)  pork (Unknown)  shellfish (Unknown)    Intolerances        MEDICATIONS    acetaminophen   Tablet .. 650 milliGRAM(s) Oral every 6 hours PRN  aspirin enteric coated 325 milliGRAM(s) Oral daily  atorvastatin 80 milliGRAM(s) Oral at bedtime  enoxaparin Injectable 40 milliGRAM(s) SubCutaneous daily  guaiFENesin Oral Liquid (Sugar-Free) 200 milliGRAM(s) Oral every 6 hours PRN  hydrocortisone hemorrhoidal Suppository 1 Suppository(s) Rectal daily PRN  influenza   Vaccine 0.5 milliLiter(s) IntraMuscular once  lactulose Syrup 20 Gram(s) Oral three times a day  levothyroxine 100 MICROGram(s) Oral daily  lidocaine 5% Ointment 1 Application(s) Topical daily  linaclotide 290 MICROGram(s) Oral before breakfast  ondansetron Injectable 4 milliGRAM(s) IV Push every 8 hours PRN  pantoprazole    Tablet 40 milliGRAM(s) Oral before breakfast  senna 2 Tablet(s) Oral at bedtime              Vital Signs Last 24 Hrs  T(C): 36.7 (21 Sep 2021 05:10), Max: 37.1 (20 Sep 2021 20:46)  T(F): 98.1 (21 Sep 2021 05:10), Max: 98.7 (20 Sep 2021 20:46)  HR: 63 (21 Sep 2021 05:10) (63 - 74)  BP: 113/63 (21 Sep 2021 05:10) (100/62 - 113/63)  BP(mean): --  RR: 18 (21 Sep 2021 05:10) (18 - 18)  SpO2: 95% (21 Sep 2021 05:10) (91% - 95%)      REVIEW OF SYSTEMS:  Constitutional:  No fever, chills, or night sweats.  Head:  No headache.  Eyes:  No double vision or blurry vision.  Ears:  No ringing in the ears.  Neck:  No neck pain.  Respiratory:  No shortness of breath.  Cardiovascular:  No chest pain.  Abdomen:  No nausea, vomiting, or abdominal pain.  Extremities/Neurological:  No numbness or tingling.  Musculoskeletal:  Occasional joint pain.  General:  Positive history of back pain.  Positive episode of right-sided weakness.    PHYSICAL EXAMINATION:  HEENT:  Head:  Normocephalic and atraumatic.  Eyes:  No scleral icterus.  Ears:  Hearing bilaterally intact.  NECK:  Supple.  RESPIRATORY:  Decreased breath sounds bilaterally.  CARDIOVASCULAR:  S1 and S2 heard.  ABDOMEN:  Soft and nontender.  EXTREMITIES:  No clubbing or cyanosis was noted.     NEUROLOGIC:  The patient is awake, alert, and oriented x3.  Extraocular movements were intact.  Full visual fields.  Able to say correct age and month.  Speech was fluent.  Intact bilateral nasolabial folds.  Motor:  Left upper was 4/5, right upper was 3/5, was able to maintain off the bed for 10 seconds, left lower 4-/5. right lower was 3+/5, was able to maintain off the bed for five seconds.  Sensory:  Bilateral upper and lower intact to light touch.  As per the patient, she feels that the right-sided weakness is getting better.               LABS:  CBC Full  -  ( 20 Sep 2021 08:14 )  WBC Count : 15.32 K/uL  RBC Count : 4.29 M/uL  Hemoglobin : 13.2 g/dL  Hematocrit : 40.2 %  Platelet Count - Automated : 343 K/uL  Mean Cell Volume : 93.7 fl  Mean Cell Hemoglobin : 30.8 pg  Mean Cell Hemoglobin Concentration : 32.8 gm/dL  Auto Neutrophil # : x  Auto Lymphocyte # : x  Auto Monocyte # : x  Auto Eosinophil # : x  Auto Basophil # : x  Auto Neutrophil % : x  Auto Lymphocyte % : x  Auto Monocyte % : x  Auto Eosinophil % : x  Auto Basophil % : x      09-20    139  |  104  |  20  ----------------------------<  89  4.5   |  29  |  0.59    Ca    8.2<L>      20 Sep 2021 08:14      Hemoglobin A1C:       Vitamin B12         RADIOLOGY      ANALYSIS AND PLAN:  This is a 81-year-old with an episode of right-sided weakness.  For episode of right-sided weakness, cerebrovascular accident versus possibly history of COVID infection   The patient will not be a tPA candidate secondary to the time of onset of symptoms.  The patient's NIH stroke scale if this was a stroke would be 4.  Will recommend aspirin 325 once a day, and after two weeks can cut down to 81.  Will recommend high-dose statin.  MRI/MRA imaging of the brain positive   Fall precautions.  Physical Therapy evaluation.  For history of hypothyroidism, adjustment of Synthroid as needed.  if possible keep sbp around 120 if needed add midodrine   monitor bowel movements as needed     Spoke with the son, Adriel, at [phone] 9/20  he understands the reasoning and thought process.    Greater than 40 minutes of time was spent with the patient, plan of care, reviewing data, speaking to the family and multidisciplinary healthcare team with greater than 50% of that time in counseling and care coordination.    Thank you for the courtesy of this consultation.  
Patient is a 81y old  Female who presents with a chief complaint of weakness (17 Sep 2021 08:36)      INTERVAL HPI/OVERNIGHT EVENTS:  Patient seen and examined. No overnight events. Feels better today. Improving weakness on right side. No dizziness, headache, visual changes. No chest pain, palpitations, SOB, nausea, vomiting, or diarrhea. Now reports that her joint hardware is titanium and she CAN have an MRI done.     MEDICATIONS  (STANDING):  aspirin enteric coated 325 milliGRAM(s) Oral daily  atorvastatin 80 milliGRAM(s) Oral at bedtime  dexAMETHasone     Tablet 6 milliGRAM(s) Oral daily  enoxaparin Injectable 40 milliGRAM(s) SubCutaneous daily  lactulose Syrup 20 Gram(s) Oral three times a day  levothyroxine 100 MICROGram(s) Oral daily  linaclotide 290 MICROGram(s) Oral before breakfast  pantoprazole    Tablet 40 milliGRAM(s) Oral before breakfast    MEDICATIONS  (PRN):  acetaminophen   Tablet .. 650 milliGRAM(s) Oral every 6 hours PRN Temp greater or equal to 38C (100.4F), Mild Pain (1 - 3)  guaiFENesin Oral Liquid (Sugar-Free) 200 milliGRAM(s) Oral every 6 hours PRN Cough  ondansetron Injectable 4 milliGRAM(s) IV Push every 8 hours PRN Nausea and/or Vomiting      Allergies    Advil (Unknown)  allergic to food coloring (Unknown)  Ceclor (Other)  cephalosporins (Other)  ciprofloxacin (Other)  Compazine (Other)  food coloring red/blue, wheat, tide, morphine, ceclor, compazine, advil, mortim, iv contrast, shellfish, oregano, tomoatoes, pork, peas, mold/spores (Unknown)  morphine (Unknown)  Motrin (Unknown)  Originally Entered as [Rash] reaction to [oregano spice] (Unknown)  peanuts (Other)  pork (Unknown)  shellfish (Unknown)    Intolerances        REVIEW OF SYSTEMS:  as per HPI    Vital Signs Last 24 Hrs  T(C): 36.4 (17 Sep 2021 04:53), Max: 37 (16 Sep 2021 13:28)  T(F): 97.6 (17 Sep 2021 04:53), Max: 98.6 (16 Sep 2021 13:28)  HR: 84 (17 Sep 2021 08:49) (64 - 84)  BP: 121/72 (17 Sep 2021 08:49) (114/64 - 121/72)  BP(mean): --  RR: 18 (17 Sep 2021 04:53) (16 - 18)  SpO2: 99% (17 Sep 2021 08:49) (92% - 99%)    PHYSICAL EXAM:  GENERAL: NAD  HEENT:  anicteric, moist mucous membranes  CHEST/LUNG:  CTA b/l, no rales, wheezes, or rhonchi  HEART:  RRR, S1, S2  ABDOMEN:  BS+, soft, nontender, nondistended  EXTREMITIES: no edema, cyanosis, or calf tenderness  NERVOUS SYSTEM: answers questions and follows commands appropriately, RUE and RLE weakness    LABS:    CBC Full  -  ( 16 Sep 2021 05:21 )  WBC Count : 10.28 K/uL  Hemoglobin : 12.4 g/dL  Hematocrit : 37.1 %  Platelet Count - Automated : 362 K/uL  Mean Cell Volume : 91.6 fl  Mean Cell Hemoglobin : 30.6 pg  Mean Cell Hemoglobin Concentration : 33.4 gm/dL  Auto Neutrophil # : 7.85 K/uL  Auto Lymphocyte # : 1.34 K/uL  Auto Monocyte # : 0.99 K/uL  Auto Eosinophil # : 0.01 K/uL  Auto Basophil # : 0.01 K/uL  Auto Neutrophil % : 76.4 %  Auto Lymphocyte % : 13.0 %  Auto Monocyte % : 9.6 %  Auto Eosinophil % : 0.1 %  Auto Basophil % : 0.1 %      Ca    8.4        16 Sep 2021 05:21      PT/INR - ( 15 Sep 2021 22:35 )   PT: 13.2 sec;   INR: 1.14 ratio         PTT - ( 15 Sep 2021 22:35 )  PTT:32.3 sec  Urinalysis Basic - ( 16 Sep 2021 02:29 )    Color: Yellow / Appearance: Slightly Turbid / S.015 / pH: x  Gluc: x / Ketone: Negative  / Bili: Negative / Urobili: Negative   Blood: x / Protein: 100 / Nitrite: Negative   Leuk Esterase: Moderate / RBC: 0-2 /HPF / WBC 26-50   Sq Epi: x / Non Sq Epi: Few / Bacteria: Few      Consultant(s) Notes Reviewed:  [x] YES  [ ] NO    
Patient seen at bedside.   reports she is not taking asa 81mg, breo, lasix, kcl at home. only taking linzess, thyroid medications and xarelto since prior admission.   PT/OT jayden noted  appreciate neuro input. will need asa 325mg daily for two weeks. lipitor added  ECHO and MRI/MRA pending 
[facility] DIVISION of INFECTIOUS DISEASE  Pradeep Mcmullen MD PhD, Hortensia Grewal MD, Sasha Ness MD, Meagan Patrick MD  and providing coverage with Lilly Galindo MD and Hailey Geller MD  Providing Infectious Disease Consultations at [facility]'s      Office# [phone] to schedule follow up appointments  Answering Service for urgent calls or New Consults [phone]  Cell# to text for urgent issues Pradeep Mcmullen [phone]     Infectious diseases progress note:    MICHEL TRENT is a 81y y. o. Female patient    COVID Patient readmitted    Allergies    Advil (Unknown)  allergic to food coloring (Unknown)  Ceclor (Other)  cephalosporins (Other)  ciprofloxacin (Other)  Compazine (Other)  food coloring red/blue, wheat, tide, morphine, ceclor, compazine, advil, mortim, iv contrast, shellfish, oregano, tomoatoes, pork, peas, mold/spores (Unknown)  morphine (Unknown)  Motrin (Unknown)  Originally Entered as [Rash] reaction to [oregano spice] (Unknown)  peanuts (Other)  pork (Unknown)  shellfish (Unknown)    Intolerances        ANTIBIOTICS/RELEVANT:  antimicrobials    immunologic:    OTHER:  acetaminophen   Tablet .. 650 milliGRAM(s) Oral every 6 hours PRN  aspirin enteric coated 325 milliGRAM(s) Oral daily  atorvastatin 80 milliGRAM(s) Oral at bedtime  enoxaparin Injectable 40 milliGRAM(s) SubCutaneous daily  guaiFENesin Oral Liquid (Sugar-Free) 200 milliGRAM(s) Oral every 6 hours PRN  lactulose Syrup 20 Gram(s) Oral three times a day  levothyroxine 100 MICROGram(s) Oral daily  linaclotide 290 MICROGram(s) Oral before breakfast  ondansetron Injectable 4 milliGRAM(s) IV Push every 8 hours PRN  pantoprazole    Tablet 40 milliGRAM(s) Oral before breakfast      Objective:  Vital Signs Last 24 Hrs  T(C): 37 (18 Sep 2021 13:04), Max: 37 (18 Sep 2021 13:04)  T(F): 98.6 (18 Sep 2021 13:04), Max: 98.6 (18 Sep 2021 13:04)  HR: 60 (18 Sep 2021 13:04) (60 - 67)  BP: 123/85 (18 Sep 2021 13:04) (109/67 - 123/85)  BP(mean): --  RR: 19 (18 Sep 2021 13:04) (18 - 19)  SpO2: 93% (18 Sep 2021 13:55) (93% - 96%)    T(C): 37 (09-18-21 @ 13:04), Max: 37 (09-18-21 @ 13:04)  T(C): 37 (09-18-21 @ 13:04), Max: 37 (09-16-21 @ 13:28)  T(C): 37 (09-18-21 @ 13:04), Max: 37 (09-16-21 @ 13:28)    PHYSICAL EXAM:  HEENT: NC atraumatic  Neck: supple  Respiratory: no accessory muscle use, breathing comfortably  Cardiovascular: distant  Gastrointestinal: normal appearing, nondistended  Extremities: no clubbing, no cyanosis,        LABS:                          13.1   13.53 )-----------( 420      ( 18 Sep 2021 09:39 )             41.0       13.53 09-18 @ 09:39  10.28 09-16 @ 05:21  11.85 09-15 @ 22:35  15.65 09-14 @ 08:11  15.39 09-13 @ 10:07  16.09 09-12 @ 09:21      09-18    140  |  104  |  21  ----------------------------<  118<H>  3.6   |  30  |  0.63    Ca    8.7      18 Sep 2021 09:39        Creatinine, Serum: 0.63 mg/dL (09-18-21 @ 09:39)  Creatinine, Serum: 0.44 mg/dL (09-16-21 @ 05:21)  Creatinine, Serum: 0.64 mg/dL (09-15-21 @ 22:35)  Creatinine, Serum: 0.43 mg/dL (09-14-21 @ 08:11)  Creatinine, Serum: 0.55 mg/dL (09-13-21 @ 10:07)  Creatinine, Serum: 0.49 mg/dL (09-12-21 @ 09:21)                COVID RISK SCORE  Auto Neutrophil #: 7.85 K/uL (09-16-21 @ 05:21)  Auto Lymphocyte #: 1.34 K/uL (09-16-21 @ 05:21)  Auto Neutrophil #: 10.55 K/uL (09-15-21 @ 22:35)  Auto Lymphocyte #: 0.68 K/uL (09-15-21 @ 22:35)  Auto Neutrophil #: 15.51 K/uL (09-11-21 @ 08:39)  Auto Lymphocyte #: 0.97 K/uL (09-11-21 @ 08:39)  Auto Neutrophil #: 8.67 K/uL (09-10-21 @ 12:51)  Auto Lymphocyte #: 0.70 K/uL (09-10-21 @ 12:51)  Auto Neutrophil #: 8.44 K/uL (09-10-21 @ 09:07)  Auto Lymphocyte #: 0.73 K/uL (09-10-21 @ 09:07)  Auto Neutrophil #: 10.23 K/uL (09-09-21 @ 08:22)  Auto Lymphocyte #: 0.79 K/uL (09-09-21 @ 08:22)  Auto Neutrophil #: 6.11 K/uL (09-08-21 @ 11:20)  Auto Lymphocyte #: 0.87 K/uL (09-08-21 @ 11:20)  Auto Neutrophil #: 9.38 K/uL (09-07-21 @ 05:19)  Auto Lymphocyte #: 0.93 K/uL (09-07-21 @ 05:19)    Lactate, Blood: 1.1 mmol/L (09-07-21 @ 05:19)    Auto Eosinophil #: 0.01 K/uL (09-16-21 @ 05:21)  Auto Eosinophil #: 0.00 K/uL (09-15-21 @ 22:35)  Auto Eosinophil #: 0.01 K/uL (09-11-21 @ 08:39)  Auto Eosinophil #: 0.00 K/uL (09-10-21 @ 12:51)  Auto Eosinophil #: 0.00 K/uL (09-10-21 @ 09:07)  Auto Eosinophil #: 0.00 K/uL (09-09-21 @ 08:22)  Auto Eosinophil #: 0.01 K/uL (09-08-21 @ 11:20)  Auto Eosinophil #: 0.01 K/uL (09-07-21 @ 05:19)        Procalcitonin, Serum: 0.98 ng/mL (09-09-21 @ 08:22)    Troponin I, Serum: .038 ng/mL (09-15-21 @ 22:35)    Creatine Kinase, Serum: 36 U/L (09-15-21 @ 22:36)        Ferritin, Serum: 812 ng/mL (09-09-21 @ 12:20)        Activated Partial Thromboplastin Time: 32.3 sec (09-15-21 @ 22:35)  INR: 1.14 ratio (09-15-21 @ 22:35)  INR: 1.14 ratio (09-14-21 @ 08:11)  INR: 1.18 ratio (09-13-21 @ 10:07)  INR: 1.23 ratio (09-12-21 @ 09:21)  INR: 1.23 ratio (09-11-21 @ 08:39)  INR: 1.27 ratio (09-10-21 @ 09:07)  INR: 1.28 ratio (09-09-21 @ 08:22)  INR: 1.40 ratio (09-08-21 @ 17:29)  Activated Partial Thromboplastin Time: 40.6 sec (09-08-21 @ 17:29)  Activated Partial Thromboplastin Time: 34.1 sec (09-07-21 @ 05:19)  INR: 1.13 ratio (09-07-21 @ 05:19)    D-Dimer Assay, Quantitative: 246 ng/mL DDU (09-10-21 @ 09:07)  D-Dimer Assay, Quantitative: 231 ng/mL DDU (09-09-21 @ 08:22)        MICROBIOLOGY:        RADIOLOGY & ADDITIONAL STUDIES:  < from: MR Head No Cont (09.17.21 @ 16:04) >    EXAM:  MR BRAIN                            PROCEDURE DATE:  09/17/2021          INTERPRETATION:  MRI brain without contrast    History right-sided weakness    Comparison CT performed 09/15/21    There are numerous tiny foci of diffusion restrictionand matching cytotoxic edema involving the anterior to posterior watershed distribution of the left hemisphere. There is no mass effect or associated hemorrhage. There is mild central volume loss and chronic microvascular ischemic change in the periventricular white matter. There is no midline shift. There is opacification of the sphenoid sinus with mixed signal material. The orbital and sellar contents and cerebellar tonsils are within normal limits. There is moderate hypertrophic pannus dorsal to the odontoid mildly deforming the ventral brainstem on the left. There is moderate mucosal thickening involving the left anterior ethmoid air cells and adjacent frontal sinus.    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    < end of copied text >  
INTERVAL HPI/OVERNIGHT EVENTS:  Patient seen and examined at bedside. States she feels well. States she felt a lot of relief after disimpaction yesterday and had another BM this AM. States she has good strength in her RLE, but still with some weakness in RUE. Denies any chest pain or SOB.    MEDICATIONS  (STANDING):  aspirin enteric coated 325 milliGRAM(s) Oral daily  atorvastatin 80 milliGRAM(s) Oral at bedtime  enoxaparin Injectable 40 milliGRAM(s) SubCutaneous daily  influenza   Vaccine 0.5 milliLiter(s) IntraMuscular once  lactulose Syrup 20 Gram(s) Oral three times a day  levothyroxine 100 MICROGram(s) Oral daily  lidocaine 5% Ointment 1 Application(s) Topical daily  linaclotide 290 MICROGram(s) Oral before breakfast  pantoprazole    Tablet 40 milliGRAM(s) Oral before breakfast  senna 2 Tablet(s) Oral at bedtime    MEDICATIONS  (PRN):  acetaminophen   Tablet .. 650 milliGRAM(s) Oral every 6 hours PRN Temp greater or equal to 38C (100.4F), Mild Pain (1 - 3)  guaiFENesin Oral Liquid (Sugar-Free) 200 milliGRAM(s) Oral every 6 hours PRN Cough  ondansetron Injectable 4 milliGRAM(s) IV Push every 8 hours PRN Nausea and/or Vomiting      Allergies    Advil (Unknown)  allergic to food coloring (Unknown)  Ceclor (Other)  cephalosporins (Other)  ciprofloxacin (Other)  Compazine (Other)  food coloring red/blue, wheat, tide, morphine, ceclor, compazine, advil, mortim, iv contrast, shellfish, oregano, tomoatoes, pork, peas, mold/spores (Unknown)  morphine (Unknown)  Motrin (Unknown)  Originally Entered as [Rash] reaction to [oregano spice] (Unknown)  peanuts (Other)  pork (Unknown)  shellfish (Unknown)    Intolerances      ROS:  CONSTITUTIONAL: No fevers or chills  EYES/ENT: No visual changes;  No vertigo or throat pain   NECK: No pain or stiffness  RESPIRATORY: No cough, wheezing, hemoptysis; No shortness of breath  CARDIOVASCULAR: No chest pain or palpitations  GASTROINTESTINAL: No abdominal or epigastric pain. No nausea, vomiting, or hematemesis.   GENITOURINARY: No dysuria, frequency or hematuria  NEUROLOGICAL: +weakness  SKIN: No itching, burning, rashes, or lesions   All other review of systems is negative unless indicated above.    Vital Signs Last 24 Hrs  T(C): 37.1 (20 Sep 2021 20:46), Max: 37.1 (20 Sep 2021 20:46)  T(F): 98.7 (20 Sep 2021 20:46), Max: 98.7 (20 Sep 2021 20:46)  HR: 74 (20 Sep 2021 20:46) (74 - 75)  BP: 100/64 (20 Sep 2021 20:46) (100/62 - 120/74)  BP(mean): --  RR: 18 (20 Sep 2021 20:46) (18 - 18)  SpO2: 94% (20 Sep 2021 20:46) (91% - 95%)    09-19 @ 07:01  -  09-20 @ 07:00  --------------------------------------------------------  IN: 0 mL / OUT: 200 mL / NET: -200 mL        PHYSICAL EXAM:  GENERAL: NAD  HEENT:  anicteric, moist mucous membranes  CHEST/LUNG:  CTA b/l, no rales, wheezes, or rhonchi  HEART:  RRR, S1, S2  ABDOMEN:  BS+, soft, nontender, mild distention  EXTREMITIES: no edema, cyanosis, or calf tenderness  NERVOUS SYSTEM: answers questions and follows commands appropriately, RUE weakness, RLE weakness improved      LABS:                        13.2   15.32 )-----------( 343      ( 20 Sep 2021 08:14 )             40.2     09-20    139  |  104  |  20  ----------------------------<  89  4.5   |  29  |  0.59    Ca    8.2<L>      20 Sep 2021 08:14            RADIOLOGY & ADDITIONAL TESTS:
INTERVAL HPI/OVERNIGHT EVENTS:  Patient seen and examined at bedside. States she feels much better. States she is able to move her RLE well, without weakness, but still with some weakness in RUE.     MEDICATIONS  (STANDING):  aspirin enteric coated 325 milliGRAM(s) Oral daily  atorvastatin 80 milliGRAM(s) Oral at bedtime  enoxaparin Injectable 40 milliGRAM(s) SubCutaneous daily  lactulose Syrup 20 Gram(s) Oral three times a day  levothyroxine 100 MICROGram(s) Oral daily  linaclotide 290 MICROGram(s) Oral before breakfast  pantoprazole    Tablet 40 milliGRAM(s) Oral before breakfast    MEDICATIONS  (PRN):  acetaminophen   Tablet .. 650 milliGRAM(s) Oral every 6 hours PRN Temp greater or equal to 38C (100.4F), Mild Pain (1 - 3)  guaiFENesin Oral Liquid (Sugar-Free) 200 milliGRAM(s) Oral every 6 hours PRN Cough  ondansetron Injectable 4 milliGRAM(s) IV Push every 8 hours PRN Nausea and/or Vomiting      Allergies    Advil (Unknown)  allergic to food coloring (Unknown)  Ceclor (Other)  cephalosporins (Other)  ciprofloxacin (Other)  Compazine (Other)  food coloring red/blue, wheat, tide, morphine, ceclor, compazine, advil, mortim, iv contrast, shellfish, oregano, tomoatoes, pork, peas, mold/spores (Unknown)  morphine (Unknown)  Motrin (Unknown)  Originally Entered as [Rash] reaction to [oregano spice] (Unknown)  peanuts (Other)  pork (Unknown)  shellfish (Unknown)    Intolerances      ROS:  CONSTITUTIONAL: No fevers or chills  EYES/ENT: No visual changes;  No vertigo or throat pain   NECK: No pain or stiffness  RESPIRATORY: No cough, wheezing, hemoptysis; No shortness of breath  CARDIOVASCULAR: No chest pain or palpitations  GASTROINTESTINAL: No abdominal or epigastric pain. No nausea, vomiting, or hematemesis.  GENITOURINARY: No dysuria, frequency or hematuria  NEUROLOGICAL: +weakness  SKIN: No itching, burning, rashes, or lesions   All other review of systems is negative unless indicated above.    Vital Signs Last 24 Hrs  T(C): 37 (18 Sep 2021 13:04), Max: 37 (18 Sep 2021 13:04)  T(F): 98.6 (18 Sep 2021 13:04), Max: 98.6 (18 Sep 2021 13:04)  HR: 60 (18 Sep 2021 13:04) (60 - 67)  BP: 123/85 (18 Sep 2021 13:04) (109/67 - 123/85)  BP(mean): --  RR: 19 (18 Sep 2021 13:04) (18 - 19)  SpO2: 93% (18 Sep 2021 13:55) (93% - 96%)    09-17 @ 07:01  -  09-18 @ 07:00  --------------------------------------------------------  IN: 0 mL / OUT: 400 mL / NET: -400 mL    09-18 @ 07:01  -  09-18 @ 18:52  --------------------------------------------------------  IN: 0 mL / OUT: 250 mL / NET: -250 mL        PHYSICAL EXAM:  GENERAL: NAD  HEENT:  anicteric, moist mucous membranes  CHEST/LUNG:  CTA b/l, no rales, wheezes, or rhonchi  HEART:  RRR, S1, S2  ABDOMEN:  BS+, soft, nontender, nondistended  EXTREMITIES: no edema, cyanosis, or calf tenderness  NERVOUS SYSTEM: answers questions and follows commands appropriately, RUE weakness, RLE weakness improved      LABS:                        13.1   13.53 )-----------( 420      ( 18 Sep 2021 09:39 )             41.0     09-18    140  |  104  |  21  ----------------------------<  118<H>  3.6   |  30  |  0.63    Ca    8.7      18 Sep 2021 09:39            RADIOLOGY & ADDITIONAL TESTS:
INTERVAL HPI/OVERNIGHT EVENTS:  Patient seen and examined at bedside. States she feels well. States she is able to move her R arm much better today. Patient denies any chest pain, SOB, abd pain. States she does not want to go to [facility], would rather go home with PT.     MEDICATIONS  (STANDING):  aspirin enteric coated 325 milliGRAM(s) Oral daily  atorvastatin 80 milliGRAM(s) Oral at bedtime  enoxaparin Injectable 40 milliGRAM(s) SubCutaneous daily  influenza   Vaccine 0.5 milliLiter(s) IntraMuscular once  lactulose Syrup 20 Gram(s) Oral three times a day  levothyroxine 100 MICROGram(s) Oral daily  lidocaine 5% Ointment 1 Application(s) Topical daily  linaclotide 290 MICROGram(s) Oral before breakfast  pantoprazole    Tablet 40 milliGRAM(s) Oral before breakfast  senna 2 Tablet(s) Oral at bedtime    MEDICATIONS  (PRN):  acetaminophen   Tablet .. 650 milliGRAM(s) Oral every 6 hours PRN Temp greater or equal to 38C (100.4F), Mild Pain (1 - 3)  guaiFENesin Oral Liquid (Sugar-Free) 200 milliGRAM(s) Oral every 6 hours PRN Cough  hydrocortisone hemorrhoidal Suppository 1 Suppository(s) Rectal daily PRN hemorroidal pain  ondansetron Injectable 4 milliGRAM(s) IV Push every 8 hours PRN Nausea and/or Vomiting      Allergies    Advil (Unknown)  allergic to food coloring (Unknown)  Ceclor (Other)  cephalosporins (Other)  ciprofloxacin (Other)  Compazine (Other)  food coloring red/blue, wheat, tide, morphine, ceclor, compazine, advil, mortim, iv contrast, shellfish, oregano, tomoatoes, pork, peas, mold/spores (Unknown)  morphine (Unknown)  Motrin (Unknown)  Originally Entered as [Rash] reaction to [oregano spice] (Unknown)  peanuts (Other)  pork (Unknown)  shellfish (Unknown)    Intolerances    ROS:  CONSTITUTIONAL: No fevers or chills  EYES/ENT: No visual changes;  No vertigo or throat pain   NECK: No pain or stiffness  RESPIRATORY: No cough, wheezing, hemoptysis; No shortness of breath  CARDIOVASCULAR: No chest pain or palpitations  GASTROINTESTINAL: No abdominal or epigastric pain. No nausea, vomiting, or hematemesis.   GENITOURINARY: No dysuria, frequency or hematuria  NEUROLOGICAL: +weakness RUE  SKIN: No itching, burning, rashes, or lesions   All other review of systems is negative unless indicated above.    Vital Signs Last 24 Hrs  T(C): 37 (21 Sep 2021 13:35), Max: 37.1 (20 Sep 2021 20:46)  T(F): 98.6 (21 Sep 2021 13:35), Max: 98.7 (20 Sep 2021 20:46)  HR: 78 (21 Sep 2021 13:35) (63 - 78)  BP: 99/64 (21 Sep 2021 13:35) (99/64 - 113/63)  BP(mean): --  RR: 18 (21 Sep 2021 13:35) (18 - 18)  SpO2: 91% (21 Sep 2021 13:35) (91% - 95%)    09-21 @ 07:01  -  09-21 @ 17:35  --------------------------------------------------------  IN: 0 mL / OUT: 500 mL / NET: -500 mL        PHYSICAL EXAM:  GENERAL: NAD  HEENT:  anicteric, moist mucous membranes  CHEST/LUNG:  CTA b/l, no rales, wheezes, or rhonchi  HEART:  RRR, S1, S2  ABDOMEN:  BS+, soft, nontender, mild distention  EXTREMITIES: no edema, cyanosis, or calf tenderness  NERVOUS SYSTEM: answers questions and follows commands appropriately, intact sensation all 4 extremities, 4+/5 strength LLE and LUE, 4/5 strength RLE, 3+/5 strength RUE- improved from yesterday      LABS:                        13.3   17.63 )-----------( 338      ( 21 Sep 2021 09:16 )             42.0     09-20    139  |  104  |  20  ----------------------------<  89  4.5   |  29  |  0.59    Ca    8.2<L>      20 Sep 2021 08:14            RADIOLOGY & ADDITIONAL TESTS:
INTERVAL HPI/OVERNIGHT EVENTS:  Patient seen and examined at bedside. Patient complaining of abdominal discomfort and bloating. Patient states she has not had a BM in days and feels impacted. Patient states she takes linzess, ex-lax and lactulose daily as outpatient, and even then has needed to be disimpacted in past. States she feels uncomfortable. Denies any SOB, chest pain.    MEDICATIONS  (STANDING):  aspirin enteric coated 325 milliGRAM(s) Oral daily  atorvastatin 80 milliGRAM(s) Oral at bedtime  enoxaparin Injectable 40 milliGRAM(s) SubCutaneous daily  lactulose Syrup 20 Gram(s) Oral three times a day  levothyroxine 100 MICROGram(s) Oral daily  linaclotide 290 MICROGram(s) Oral before breakfast  pantoprazole    Tablet 40 milliGRAM(s) Oral before breakfast  senna 2 Tablet(s) Oral at bedtime    MEDICATIONS  (PRN):  acetaminophen   Tablet .. 650 milliGRAM(s) Oral every 6 hours PRN Temp greater or equal to 38C (100.4F), Mild Pain (1 - 3)  guaiFENesin Oral Liquid (Sugar-Free) 200 milliGRAM(s) Oral every 6 hours PRN Cough  ondansetron Injectable 4 milliGRAM(s) IV Push every 8 hours PRN Nausea and/or Vomiting      Allergies    Advil (Unknown)  allergic to food coloring (Unknown)  Ceclor (Other)  cephalosporins (Other)  ciprofloxacin (Other)  Compazine (Other)  food coloring red/blue, wheat, tide, morphine, ceclor, compazine, advil, mortim, iv contrast, shellfish, oregano, tomoatoes, pork, peas, mold/spores (Unknown)  morphine (Unknown)  Motrin (Unknown)  Originally Entered as [Rash] reaction to [oregano spice] (Unknown)  peanuts (Other)  pork (Unknown)  shellfish (Unknown)    Intolerances      ROS:  CONSTITUTIONAL: No fevers or chills  EYES/ENT: No visual changes;  No vertigo or throat pain   NECK: No pain or stiffness  RESPIRATORY: No cough, wheezing, hemoptysis; No shortness of breath  CARDIOVASCULAR: No chest pain or palpitations  GASTROINTESTINAL: No abdominal or epigastric pain. No nausea, vomiting, or hematemesis. +constipation  GENITOURINARY: No dysuria, frequency or hematuria  NEUROLOGICAL: +weakness  SKIN: No itching, burning, rashes, or lesions   All other review of systems is negative unless indicated above.    Vital Signs Last 24 Hrs  T(C): 36.6 (19 Sep 2021 13:03), Max: 36.7 (18 Sep 2021 20:14)  T(F): 97.9 (19 Sep 2021 13:03), Max: 98.1 (18 Sep 2021 20:14)  HR: 90 (19 Sep 2021 16:33) (63 - 90)  BP: 126/74 (19 Sep 2021 13:03) (110/68 - 126/74)  BP(mean): --  RR: 20 (19 Sep 2021 16:33) (18 - 20)  SpO2: 93% (19 Sep 2021 16:33) (90% - 93%)    09-18 @ 07:01  -  09-19 @ 07:00  --------------------------------------------------------  IN: 0 mL / OUT: 250 mL / NET: -250 mL        PHYSICAL EXAM:  GENERAL: NAD  HEENT:  anicteric, moist mucous membranes  CHEST/LUNG:  CTA b/l, no rales, wheezes, or rhonchi  HEART:  RRR, S1, S2  ABDOMEN:  BS+, soft, nontender, mild distention  EXTREMITIES: no edema, cyanosis, or calf tenderness  NERVOUS SYSTEM: answers questions and follows commands appropriately, RUE weakness, RLE weakness improved      LABS:                        12.4   12.37 )-----------( 368      ( 19 Sep 2021 09:17 )             38.2     09-19    141  |  105  |  21  ----------------------------<  79  3.9   |  31  |  0.55    Ca    8.3<L>      19 Sep 2021 09:17            RADIOLOGY & ADDITIONAL TESTS:

## 2021-11-15 NOTE — PATIENT PROFILE ADULT. - ATTEMPT TO OOB
Patient states he tested positive for covid on 11/2/21 and still does not feel well. States its has been past the 10 day quarantine. Is wondering if he can do a video visit with  and get something sent in for a sinus infection.    Please call patient.     no

## 2022-02-23 NOTE — H&P PST ADULT - COMMENTS
Quality 47: Advance Care Plan: Advance Care Planning discussed and documented; advance care plan or surrogate decision maker documented in the medical record. Quality 226: Preventive Care And Screening: Tobacco Use: Screening And Cessation Intervention: Patient screened for tobacco use and is an ex/non-smoker Quality 111:Pneumonia Vaccination Status For Older Adults: Pneumococcal Vaccination Previously Received Detail Level: Detailed Quality 130: Documentation Of Current Medications In The Medical Record: Current Medications Documented Quality 431: Preventive Care And Screening: Unhealthy Alcohol Use - Screening: Patient not identified as an unhealthy alcohol user when screened for unhealthy alcohol use using a systematic screening method 2015

## 2022-04-28 NOTE — DISCHARGE NOTE PROVIDER - DISCHARGE DATE
13-Sep-2021
[FreeTextEntry1] : 89 yo WF, here with her daughter, as a new patient/evaluation. Pt states she began developing weeping ulcers in BLE about 1 month ago. As a result, pt went to the ER 5 days ago and also saw her PCP who put her on keflex and referred pt to the [facility].\par    Pt with a h/o CHF and "leaky heart valve", being followed by a cardiologist and presently on a diuretic.

## 2022-09-26 NOTE — PATIENT PROFILE ADULT - NSPROPTRIGHTSUPPORTPERSON_GEN_A_NUR
Spoke with pt's mother regarding medication. Everything is all taken care of. Pt is in route to  prescription    declines

## 2022-10-12 NOTE — ED PROVIDER NOTE - NSICDXPASTMEDICALHX_GEN_ALL_CORE_FT
PAST MEDICAL HISTORY:  Cataract     Chronic Back Pain     Constipation     Diabetes     Edema     Hypothyroidism     Lumbar back pain     MRSA colonization     Osteoarthritis     Spastic colon     
1

## 2023-01-17 NOTE — ED PROVIDER NOTE - DURATION
4/day(s) Bexarotene Pregnancy And Lactation Text: This medication is Pregnancy Category X and should not be given to women who are pregnant or may become pregnant. This medication should not be used if you are breast feeding.

## 2023-04-26 NOTE — ED PROVIDER NOTE - PRINCIPAL DIAGNOSIS
Sinusitis, unspecified chronicity, unspecified location Sarecycline Counseling: Patient advised regarding possible photosensitivity and discoloration of the teeth, skin, lips, tongue and gums.  Patient instructed to avoid sunlight, if possible.  When exposed to sunlight, patients should wear protective clothing, sunglasses, and sunscreen.  The patient was instructed to call the office immediately if the following severe adverse effects occur:  hearing changes, easy bruising/bleeding, severe headache, or vision changes.  The patient verbalized understanding of the proper use and possible adverse effects of sarecycline.  All of the patient's questions and concerns were addressed.

## 2023-04-30 NOTE — BH CONSULTATION LIAISON ASSESSMENT NOTE - SUMMARY
Patient does have a follow up appointment May 22nd when she is home from college on summer break
moderate
80 y/o female experiencing COVID

## 2023-05-08 NOTE — PHYSICAL THERAPY INITIAL EVALUATION ADULT - DISCHARGE DISPOSITION, PT EVAL
Spoke to pharmacy rep Laurita verified pt already has the prescribed dosage of losartan 100/25 pt has enough til June 4th. Too soon to fill right not. Original script was from 03/28/2023 Dr Kidd prescriber. according to rep.   TBD when pt. performs OOB mobility/home w/ home PT/rehabilitation facility

## 2023-06-07 ENCOUNTER — INPATIENT (INPATIENT)
Facility: HOSPITAL | Age: 83
LOS: 4 days | Discharge: HOME | DRG: 392 | End: 2023-06-12
Attending: FAMILY MEDICINE | Admitting: STUDENT IN AN ORGANIZED HEALTH CARE EDUCATION/TRAINING PROGRAM
Payer: MEDICARE

## 2023-06-07 VITALS
TEMPERATURE: 98 F | SYSTOLIC BLOOD PRESSURE: 168 MMHG | HEART RATE: 96 BPM | WEIGHT: 154.32 LBS | RESPIRATION RATE: 18 BRPM | OXYGEN SATURATION: 96 % | DIASTOLIC BLOOD PRESSURE: 85 MMHG | HEIGHT: 62 IN

## 2023-06-07 DIAGNOSIS — Z98.890 OTHER SPECIFIED POSTPROCEDURAL STATES: Chronic | ICD-10-CM

## 2023-06-07 DIAGNOSIS — Z96.659 PRESENCE OF UNSPECIFIED ARTIFICIAL KNEE JOINT: Chronic | ICD-10-CM

## 2023-06-07 DIAGNOSIS — Z95.828 PRESENCE OF OTHER VASCULAR IMPLANTS AND GRAFTS: Chronic | ICD-10-CM

## 2023-06-07 LAB
ALBUMIN SERPL ELPH-MCNC: 3.9 G/DL — SIGNIFICANT CHANGE UP (ref 3.3–5)
ALP SERPL-CCNC: 119 U/L — SIGNIFICANT CHANGE UP (ref 40–120)
ALT FLD-CCNC: 56 U/L — SIGNIFICANT CHANGE UP (ref 12–78)
ANION GAP SERPL CALC-SCNC: 5 MMOL/L — SIGNIFICANT CHANGE UP (ref 5–17)
AST SERPL-CCNC: 33 U/L — SIGNIFICANT CHANGE UP (ref 15–37)
BASOPHILS # BLD AUTO: 0.06 K/UL — SIGNIFICANT CHANGE UP (ref 0–0.2)
BASOPHILS NFR BLD AUTO: 0.4 % — SIGNIFICANT CHANGE UP (ref 0–2)
BILIRUB SERPL-MCNC: 0.5 MG/DL — SIGNIFICANT CHANGE UP (ref 0.2–1.2)
BUN SERPL-MCNC: 17 MG/DL — SIGNIFICANT CHANGE UP (ref 7–23)
CALCIUM SERPL-MCNC: 9.4 MG/DL — SIGNIFICANT CHANGE UP (ref 8.5–10.1)
CHLORIDE SERPL-SCNC: 108 MMOL/L — SIGNIFICANT CHANGE UP (ref 96–108)
CO2 SERPL-SCNC: 28 MMOL/L — SIGNIFICANT CHANGE UP (ref 22–31)
CREAT SERPL-MCNC: 0.72 MG/DL — SIGNIFICANT CHANGE UP (ref 0.5–1.3)
EGFR: 84 ML/MIN/1.73M2 — SIGNIFICANT CHANGE UP
EOSINOPHIL # BLD AUTO: 0.16 K/UL — SIGNIFICANT CHANGE UP (ref 0–0.5)
EOSINOPHIL NFR BLD AUTO: 1.1 % — SIGNIFICANT CHANGE UP (ref 0–6)
GLUCOSE SERPL-MCNC: 104 MG/DL — HIGH (ref 70–99)
HCT VFR BLD CALC: 42.7 % — SIGNIFICANT CHANGE UP (ref 34.5–45)
HGB BLD-MCNC: 13.5 G/DL — SIGNIFICANT CHANGE UP (ref 11.5–15.5)
IMM GRANULOCYTES NFR BLD AUTO: 0.5 % — SIGNIFICANT CHANGE UP (ref 0–0.9)
LIDOCAIN IGE QN: 109 U/L — SIGNIFICANT CHANGE UP (ref 73–393)
LYMPHOCYTES # BLD AUTO: 1.73 K/UL — SIGNIFICANT CHANGE UP (ref 1–3.3)
LYMPHOCYTES # BLD AUTO: 11.4 % — LOW (ref 13–44)
MCHC RBC-ENTMCNC: 31.2 PG — SIGNIFICANT CHANGE UP (ref 27–34)
MCHC RBC-ENTMCNC: 31.6 GM/DL — LOW (ref 32–36)
MCV RBC AUTO: 98.6 FL — SIGNIFICANT CHANGE UP (ref 80–100)
MONOCYTES # BLD AUTO: 1.09 K/UL — HIGH (ref 0–0.9)
MONOCYTES NFR BLD AUTO: 7.2 % — SIGNIFICANT CHANGE UP (ref 2–14)
NEUTROPHILS # BLD AUTO: 12 K/UL — HIGH (ref 1.8–7.4)
NEUTROPHILS NFR BLD AUTO: 79.4 % — HIGH (ref 43–77)
NRBC # BLD: 0 /100 WBCS — SIGNIFICANT CHANGE UP (ref 0–0)
PLATELET # BLD AUTO: 288 K/UL — SIGNIFICANT CHANGE UP (ref 150–400)
POTASSIUM SERPL-MCNC: 4.1 MMOL/L — SIGNIFICANT CHANGE UP (ref 3.5–5.3)
POTASSIUM SERPL-SCNC: 4.1 MMOL/L — SIGNIFICANT CHANGE UP (ref 3.5–5.3)
PROT SERPL-MCNC: 6.9 G/DL — SIGNIFICANT CHANGE UP (ref 6–8.3)
RBC # BLD: 4.33 M/UL — SIGNIFICANT CHANGE UP (ref 3.8–5.2)
RBC # FLD: 13.1 % — SIGNIFICANT CHANGE UP (ref 10.3–14.5)
SODIUM SERPL-SCNC: 141 MMOL/L — SIGNIFICANT CHANGE UP (ref 135–145)
WBC # BLD: 15.12 K/UL — HIGH (ref 3.8–10.5)
WBC # FLD AUTO: 15.12 K/UL — HIGH (ref 3.8–10.5)

## 2023-06-07 PROCEDURE — 99285 EMERGENCY DEPT VISIT HI MDM: CPT

## 2023-06-07 NOTE — ED ADULT NURSE NOTE - NSFALLUNIVINTERV_ED_ALL_ED
Bed/Stretcher in lowest position, wheels locked, appropriate side rails in place/Call bell, personal items and telephone in reach/Instruct patient to call for assistance before getting out of bed/chair/stretcher/Non-slip footwear applied when patient is off stretcher/[place] to call system/Physically safe environment - no spills, clutter or unnecessary equipment/Purposeful proactive rounding/Room/bathroom lighting operational, light cord in reach

## 2023-06-07 NOTE — ED ADULT NURSE NOTE - PLAN OF CARE DISCUSSED WITH:
Patient Erythromycin Pregnancy And Lactation Text: This medication is Pregnancy Category B and is considered safe during pregnancy. It is also excreted in breast milk.

## 2023-06-07 NOTE — ED PROVIDER NOTE - PHYSICAL EXAMINATION
Gen: Well appearing in NAD  Head: NC/AT  Neck: trachea midline  Resp:  No distress  abd tender in lower abdomen   Ext: no deformities  Neuro:  A&O appears non focal  Skin:  Warm and dry as visualized  Psych:  Normal affect and mood Gen: Well appearing in NAD  Head: NC/AT  Neck: trachea midline  Resp:  No distress  abd tender in lower abdomen   rectal exam with large amount of stool in vault, attempted disimpaction but pt did not tolerate   Ext: no deformities  Neuro:  A&O appears non focal  Skin:  Warm and dry as visualized  Psych:  Normal affect and mood

## 2023-06-07 NOTE — ED PROVIDER NOTE - NSICDXPASTSURGICALHX_GEN_ALL_CORE_FT
PAST SURGICAL HISTORY:  Foss filter in place 2009    H/O sinus surgery 2/2017    H/O Spinal surgery 2009    S/P Appendectomy 1968    S/P Cholecystectomy 1968    S/P Hysterectomy 1987    S/P TKR (Total Knee Replacement) Right 2015    S/P TKR (total knee replacement) Left 2009

## 2023-06-07 NOTE — ED ADULT NURSE NOTE - CHIEF COMPLAINT QUOTE
pt c/o constipation x 7 days Bilateral Helical Rim Advancement Flap Text: The defect edges were debeveled with a #15 blade scalpel.  Given the location of the defect and the proximity to free margins (helical rim) a bilateral helical rim advancement flap was deemed most appropriate.  Using a sterile surgical marker, the appropriate advancement flaps were drawn incorporating the defect and placing the expected incisions between the helical rim and antihelix where possible.  The area thus outlined was incised through and through with a #15 scalpel blade.  With a skin hook and iris scissors, the flaps were gently and sharply undermined and freed up.

## 2023-06-07 NOTE — ED PROVIDER NOTE - CLINICAL SUMMARY MEDICAL DECISION MAKING FREE TEXT BOX
84yo F ho IBS, chronic constipation, on linzess, stool softeners, lactulose, pw constipation, last BM 1 week ago. today with rectal pain, abd pain and episode of vomiting. pt states when she hwas hospitalized for covid had similar episode and needed disimpaction   likely consitpation, but given ho abdominal surgeries and abd pain, ro sbo

## 2023-06-07 NOTE — ED PROVIDER NOTE - PROGRESS NOTE DETAILS
Pt signed out to me by Dr. Bolivar to f/u CT scan  CT scan report reviewed, shows stercoral proctitis  Case d/w Dr. Dangelo for admission

## 2023-06-07 NOTE — ED ADULT TRIAGE NOTE - BP NONINVASIVE SYSTOLIC (MM HG)
SURGERY VISIT    3/8/2017    REASON FOR VISIT:  Left groin pain    LAST VISIT WITH ME:  Visit date not found    HISTORY OF PRESENT ILLNESS:  Mr. Cameron is a 51 year old male who I am seeing at the request of Isa Avery PA-C for left groin pain. He has had discomfort in the left inguinal area for approximately 1.5 weeks. He reports that this started a few days after moving large pieces of metal while at work. He states that the pain has been slowly improving and is now less sharp. He denies radiation of the pain into his testicle. He has not noticed a bulge in this area. He denies nausea, vomiting, and constipation. He denies aggravation with lifting, coughing, laughing, and sneezing. He reports that laying down flat and icing the area seem to help with the pain. He has not had a previous inguinal hernia before.    PAST MEDICAL HISTORY: (reviewed and updated in the History Section of the Epic chart)    Unspecified essential hypertension                            Other and unspecified hyperlipidemia                          Ankle fracture                                  01/01/1989    Colon polyp                                                   Bilateral chronic knee pain                                   GERD (gastroesophageal reflux disease)                        Hiatal hernia                                                 Coronary atherosclerosis of native coronary ar*                 Comment: june 28 2013 syncope, new cardiac stent    Syncope and collapse                            june 28  *     PAST SURGICAL HISTORY: (reviewed and updated in the History Section of the Epic chart)    CARDIAC CATHETERIZATION                                       FRACTURE SURGERY                                              MEDICATIONS:  Current Outpatient Prescriptions   Medication Sig Dispense Refill   • rosuvastatin (CRESTOR) 20 MG tablet Take 1 tablet by mouth daily. 90 tablet 3   • valsartan (DIOVAN) 40 MG tablet TAKE  1 TABLET BY MOUTH DAILY 90 tablet 3   • aspirin (ASPIR-81) 81 MG EC tablet Take 1 tablet by mouth daily. 30 tablet 11   • carvedilol (COREG) 6.25 MG tablet Take 1 tablet by mouth 2 times daily (with meals). 180 tablet 3   • nitroGLYcerin (NITROSTAT) 0.4 MG SL tablet Place 0.4 mg under the tongue every 5 minutes as needed.     • Omega-3 Fatty Acids (FISH OIL) 1000 MG capsule Take 2 g by mouth daily. 180 capsule 3   • ranitidine (ZANTAC) 75 MG tablet Take 75 mg by mouth daily.       No current facility-administered medications for this visit.        ALLERGIES:  No Known Allergies    Family History   Problem Relation Age of Onset   • Heart disease Father        Social History     Social History   • Marital status: Single     Spouse name: N/A   • Number of children: N/A   • Years of education: N/A     Occupational History   • Not on file.     Social History Main Topics   • Smoking status: Never Smoker   • Smokeless tobacco: Never Used   • Alcohol use No   • Drug use: No   • Sexual activity: Not on file     Other Topics Concern   • Not on file     Social History Narrative       REVIEW OF SYSTEMS:   CONSTITUTIONAL: denies fever, fatigue, and unintentional weight change  GASTROINTESTINAL: see HPI    PHYSICAL EXAM:  VITAL SIGNS:   Visit Vitals   • /68   • Pulse 60   • Ht 5' 8\" (1.727 m)   • Wt 80.7 kg   • BMI 27.06 kg/m2     GENERAL: cooperative, and not in acute distress  SKIN: appropriately warm, no rashes   LUNGS: no use of accessory muscles in breathing  ABDOMEN: flat, normoactive bowel sounds, non-tender, no hepatosplenomegaly, no distension, no abdominal wall masses  GENITOURINARY: the testicles are descended and normal in size and shape, a clear hernia is not appreciated, but there is a questionable loose inguinal ring on the left, there is no evidence of a right inguinal hernia   PSYCH: alert and oriented x3, normal mood and affect  MUSCULOSKELETAL: ambulatory without assistance      ASSESSMENT:   Mr. Cameron  is a 51 year old male with left groin pain. Differential diagnosis includes loose left inguinal ring, occult inguinal hernia, and groin strain.       PLAN:   Conservative management with rest, ice, and NSAIDs x7-10 days. We will see the patient back in 4 weeks, and if his pain has not improved at that time we could consider getting an imaging study for further evaluation unless his exam is more convincing at his next visit.    I have discussed the above findings with Dr. Paolo Velasquez, who also examined the patient, and is in agreement with this plan.    I spent 30 minutes with the patient, and >50% of this time was spent on counseling and coordination of care.    Clarisa Flores PA-C                  168

## 2023-06-07 NOTE — ED PROVIDER NOTE - OBJECTIVE STATEMENT
82yo F ho IBS, chronic constipation, on linzess, stool softeners, lactulose, pw constipation, last BM 1 week ago. today with rectal pain, abd pain and episode of vomiting. pt states when she hwas hospitalized for covid had similar episode and needed disimpaction  breezy taylor, breezy lucio

## 2023-06-08 DIAGNOSIS — J32.9 CHRONIC SINUSITIS, UNSPECIFIED: ICD-10-CM

## 2023-06-08 DIAGNOSIS — R73.03 PREDIABETES: ICD-10-CM

## 2023-06-08 DIAGNOSIS — K52.89 OTHER SPECIFIED NONINFECTIVE GASTROENTERITIS AND COLITIS: ICD-10-CM

## 2023-06-08 DIAGNOSIS — I63.9 CEREBRAL INFARCTION, UNSPECIFIED: ICD-10-CM

## 2023-06-08 DIAGNOSIS — R60.0 LOCALIZED EDEMA: ICD-10-CM

## 2023-06-08 DIAGNOSIS — Z29.9 ENCOUNTER FOR PROPHYLACTIC MEASURES, UNSPECIFIED: ICD-10-CM

## 2023-06-08 DIAGNOSIS — E03.9 HYPOTHYROIDISM, UNSPECIFIED: ICD-10-CM

## 2023-06-08 DIAGNOSIS — K59.00 CONSTIPATION, UNSPECIFIED: ICD-10-CM

## 2023-06-08 LAB
ALBUMIN SERPL ELPH-MCNC: 3.1 G/DL — LOW (ref 3.3–5)
ALP SERPL-CCNC: 94 U/L — SIGNIFICANT CHANGE UP (ref 40–120)
ALT FLD-CCNC: 45 U/L — SIGNIFICANT CHANGE UP (ref 12–78)
ANION GAP SERPL CALC-SCNC: 5 MMOL/L — SIGNIFICANT CHANGE UP (ref 5–17)
AST SERPL-CCNC: 28 U/L — SIGNIFICANT CHANGE UP (ref 15–37)
BASOPHILS # BLD AUTO: 0.05 K/UL — SIGNIFICANT CHANGE UP (ref 0–0.2)
BASOPHILS NFR BLD AUTO: 0.5 % — SIGNIFICANT CHANGE UP (ref 0–2)
BILIRUB SERPL-MCNC: 0.5 MG/DL — SIGNIFICANT CHANGE UP (ref 0.2–1.2)
BUN SERPL-MCNC: 14 MG/DL — SIGNIFICANT CHANGE UP (ref 7–23)
CALCIUM SERPL-MCNC: 8.5 MG/DL — SIGNIFICANT CHANGE UP (ref 8.5–10.1)
CHLORIDE SERPL-SCNC: 111 MMOL/L — HIGH (ref 96–108)
CO2 SERPL-SCNC: 25 MMOL/L — SIGNIFICANT CHANGE UP (ref 22–31)
CREAT SERPL-MCNC: 0.51 MG/DL — SIGNIFICANT CHANGE UP (ref 0.5–1.3)
EGFR: 94 ML/MIN/1.73M2 — SIGNIFICANT CHANGE UP
EOSINOPHIL # BLD AUTO: 0.1 K/UL — SIGNIFICANT CHANGE UP (ref 0–0.5)
EOSINOPHIL NFR BLD AUTO: 0.9 % — SIGNIFICANT CHANGE UP (ref 0–6)
GLUCOSE SERPL-MCNC: 89 MG/DL — SIGNIFICANT CHANGE UP (ref 70–99)
HCT VFR BLD CALC: 37.6 % — SIGNIFICANT CHANGE UP (ref 34.5–45)
HGB BLD-MCNC: 12.1 G/DL — SIGNIFICANT CHANGE UP (ref 11.5–15.5)
IMM GRANULOCYTES NFR BLD AUTO: 0.3 % — SIGNIFICANT CHANGE UP (ref 0–0.9)
LYMPHOCYTES # BLD AUTO: 1.38 K/UL — SIGNIFICANT CHANGE UP (ref 1–3.3)
LYMPHOCYTES # BLD AUTO: 12.5 % — LOW (ref 13–44)
MCHC RBC-ENTMCNC: 31.8 PG — SIGNIFICANT CHANGE UP (ref 27–34)
MCHC RBC-ENTMCNC: 32.2 GM/DL — SIGNIFICANT CHANGE UP (ref 32–36)
MCV RBC AUTO: 98.7 FL — SIGNIFICANT CHANGE UP (ref 80–100)
MONOCYTES # BLD AUTO: 1.03 K/UL — HIGH (ref 0–0.9)
MONOCYTES NFR BLD AUTO: 9.3 % — SIGNIFICANT CHANGE UP (ref 2–14)
NEUTROPHILS # BLD AUTO: 8.48 K/UL — HIGH (ref 1.8–7.4)
NEUTROPHILS NFR BLD AUTO: 76.5 % — SIGNIFICANT CHANGE UP (ref 43–77)
NRBC # BLD: 0 /100 WBCS — SIGNIFICANT CHANGE UP (ref 0–0)
PLATELET # BLD AUTO: 237 K/UL — SIGNIFICANT CHANGE UP (ref 150–400)
POTASSIUM SERPL-MCNC: 4.1 MMOL/L — SIGNIFICANT CHANGE UP (ref 3.5–5.3)
POTASSIUM SERPL-SCNC: 4.1 MMOL/L — SIGNIFICANT CHANGE UP (ref 3.5–5.3)
PROT SERPL-MCNC: 5.7 G/DL — LOW (ref 6–8.3)
RBC # BLD: 3.81 M/UL — SIGNIFICANT CHANGE UP (ref 3.8–5.2)
RBC # FLD: 13.1 % — SIGNIFICANT CHANGE UP (ref 10.3–14.5)
SODIUM SERPL-SCNC: 141 MMOL/L — SIGNIFICANT CHANGE UP (ref 135–145)
TSH SERPL-MCNC: 2.15 UIU/ML — SIGNIFICANT CHANGE UP (ref 0.36–3.74)
WBC # BLD: 11.07 K/UL — HIGH (ref 3.8–10.5)
WBC # FLD AUTO: 11.07 K/UL — HIGH (ref 3.8–10.5)

## 2023-06-08 PROCEDURE — 99233 SBSQ HOSP IP/OBS HIGH 50: CPT

## 2023-06-08 PROCEDURE — 71045 X-RAY EXAM CHEST 1 VIEW: CPT | Mod: 26

## 2023-06-08 PROCEDURE — 74176 CT ABD & PELVIS W/O CONTRAST: CPT | Mod: 26,MA

## 2023-06-08 PROCEDURE — 99223 1ST HOSP IP/OBS HIGH 75: CPT | Mod: GC

## 2023-06-08 PROCEDURE — 93010 ELECTROCARDIOGRAM REPORT: CPT

## 2023-06-08 RX ORDER — METFORMIN HYDROCHLORIDE 850 MG/1
1 TABLET ORAL
Refills: 0 | DISCHARGE

## 2023-06-08 RX ORDER — POLYETHYLENE GLYCOL 3350 17 G/17G
17 POWDER, FOR SOLUTION ORAL
Refills: 0 | Status: DISCONTINUED | OUTPATIENT
Start: 2023-06-08 | End: 2023-06-12

## 2023-06-08 RX ORDER — LINACLOTIDE 145 UG/1
1 CAPSULE, GELATIN COATED ORAL
Qty: 0 | Refills: 0 | DISCHARGE

## 2023-06-08 RX ORDER — FLUTICASONE FUROATE AND VILANTEROL TRIFENATATE 100; 25 UG/1; UG/1
1 POWDER RESPIRATORY (INHALATION)
Qty: 0 | Refills: 0 | DISCHARGE

## 2023-06-08 RX ORDER — ENOXAPARIN SODIUM 100 MG/ML
40 INJECTION SUBCUTANEOUS EVERY 24 HOURS
Refills: 0 | Status: DISCONTINUED | OUTPATIENT
Start: 2023-06-08 | End: 2023-06-12

## 2023-06-08 RX ORDER — ASPIRIN/CALCIUM CARB/MAGNESIUM 324 MG
81 TABLET ORAL DAILY
Refills: 0 | Status: DISCONTINUED | OUTPATIENT
Start: 2023-06-08 | End: 2023-06-12

## 2023-06-08 RX ORDER — LEVOTHYROXINE SODIUM 125 MCG
1 TABLET ORAL
Qty: 0 | Refills: 0 | DISCHARGE

## 2023-06-08 RX ORDER — LACTULOSE 10 G/15ML
20 SOLUTION ORAL THREE TIMES A DAY
Refills: 0 | Status: DISCONTINUED | OUTPATIENT
Start: 2023-06-08 | End: 2023-06-09

## 2023-06-08 RX ORDER — SODIUM CHLORIDE 9 MG/ML
1000 INJECTION INTRAMUSCULAR; INTRAVENOUS; SUBCUTANEOUS ONCE
Refills: 0 | Status: COMPLETED | OUTPATIENT
Start: 2023-06-08 | End: 2023-06-08

## 2023-06-08 RX ORDER — ONDANSETRON 8 MG/1
4 TABLET, FILM COATED ORAL EVERY 8 HOURS
Refills: 0 | Status: DISCONTINUED | OUTPATIENT
Start: 2023-06-08 | End: 2023-06-12

## 2023-06-08 RX ORDER — LANOLIN ALCOHOL/MO/W.PET/CERES
3 CREAM (GRAM) TOPICAL AT BEDTIME
Refills: 0 | Status: DISCONTINUED | OUTPATIENT
Start: 2023-06-08 | End: 2023-06-12

## 2023-06-08 RX ORDER — MELOXICAM 15 MG/1
1 TABLET ORAL
Qty: 0 | Refills: 0 | DISCHARGE

## 2023-06-08 RX ORDER — SENNA PLUS 8.6 MG/1
2 TABLET ORAL AT BEDTIME
Refills: 0 | Status: DISCONTINUED | OUTPATIENT
Start: 2023-06-08 | End: 2023-06-12

## 2023-06-08 RX ORDER — FUROSEMIDE 40 MG
40 TABLET ORAL DAILY
Refills: 0 | Status: DISCONTINUED | OUTPATIENT
Start: 2023-06-08 | End: 2023-06-08

## 2023-06-08 RX ORDER — ACETAMINOPHEN 500 MG
1000 TABLET ORAL ONCE
Refills: 0 | Status: COMPLETED | OUTPATIENT
Start: 2023-06-08 | End: 2023-06-08

## 2023-06-08 RX ORDER — PRASTERONE 6.5 MG/1
1 INSERT VAGINAL
Qty: 0 | Refills: 0 | DISCHARGE

## 2023-06-08 RX ORDER — ACETAMINOPHEN 500 MG
650 TABLET ORAL EVERY 6 HOURS
Refills: 0 | Status: DISCONTINUED | OUTPATIENT
Start: 2023-06-08 | End: 2023-06-12

## 2023-06-08 RX ORDER — LEVOTHYROXINE SODIUM 125 MCG
100 TABLET ORAL DAILY
Refills: 0 | Status: DISCONTINUED | OUTPATIENT
Start: 2023-06-08 | End: 2023-06-12

## 2023-06-08 RX ORDER — HYDROCORTISONE 1 %
1 OINTMENT (GRAM) TOPICAL AT BEDTIME
Refills: 0 | Status: DISCONTINUED | OUTPATIENT
Start: 2023-06-08 | End: 2023-06-12

## 2023-06-08 RX ADMIN — SENNA PLUS 2 TABLET(S): 8.6 TABLET ORAL at 21:21

## 2023-06-08 RX ADMIN — ENOXAPARIN SODIUM 40 MILLIGRAM(S): 100 INJECTION SUBCUTANEOUS at 06:07

## 2023-06-08 RX ADMIN — POLYETHYLENE GLYCOL 3350 17 GRAM(S): 17 POWDER, FOR SOLUTION ORAL at 06:07

## 2023-06-08 RX ADMIN — SODIUM CHLORIDE 1000 MILLILITER(S): 9 INJECTION INTRAMUSCULAR; INTRAVENOUS; SUBCUTANEOUS at 03:41

## 2023-06-08 RX ADMIN — LACTULOSE 20 GRAM(S): 10 SOLUTION ORAL at 06:07

## 2023-06-08 RX ADMIN — LACTULOSE 20 GRAM(S): 10 SOLUTION ORAL at 21:21

## 2023-06-08 RX ADMIN — Medication 1000 MILLIGRAM(S): at 10:36

## 2023-06-08 RX ADMIN — Medication 100 MICROGRAM(S): at 06:07

## 2023-06-08 RX ADMIN — LACTULOSE 20 GRAM(S): 10 SOLUTION ORAL at 13:30

## 2023-06-08 RX ADMIN — Medication 81 MILLIGRAM(S): at 13:30

## 2023-06-08 RX ADMIN — POLYETHYLENE GLYCOL 3350 17 GRAM(S): 17 POWDER, FOR SOLUTION ORAL at 18:52

## 2023-06-08 RX ADMIN — Medication 400 MILLIGRAM(S): at 03:41

## 2023-06-08 RX ADMIN — Medication 1 SUPPOSITORY(S): at 21:21

## 2023-06-08 RX ADMIN — ONDANSETRON 4 MILLIGRAM(S): 8 TABLET, FILM COATED ORAL at 10:44

## 2023-06-08 NOTE — H&P ADULT - NSHPPHYSICALEXAM_GEN_ALL_CORE
T(C): 36.6 (06-07-23 @ 21:57), Max: 36.6 (06-07-23 @ 21:57)  HR: 96 (06-07-23 @ 21:57) (96 - 96)  BP: 168/85 (06-07-23 @ 21:57) (168/85 - 168/85)  RR: 18 (06-07-23 @ 21:57) (18 - 18)  SpO2: 96% (06-07-23 @ 21:57) (96% - 96%)    GENERAL: patient appears well, no acute distress  EYES: sclera clear, no exudates  ENMT: oropharynx clear without erythema, no exudates, dry mucous membranes  NECK: supple, soft  LUNGS: good air entry bilaterally, clear to auscultation, symmetric breath sounds, no wheezing or rhonchi appreciated  HEART: soft S1/S2, regular rate and rhythm, no murmurs noted, trace bilateral lower extremity edema  GASTROINTESTINAL: abdomen is soft, tender to palpation of the right lower and left lower quadrants, distended, normoactive bowel sounds, stool palpated   INTEGUMENT: excoriations of bilateral lower extremities with faint erythema of the left extremity  MUSCULOSKELETAL: no clubbing or cyanosis, no obvious deformity  NEUROLOGIC: awake, alert, oriented x3, good muscle tone in 4 extremities, no obvious sensory deficits  PSYCHIATRIC: mood is good, affect is congruent, linear and logical thought process T(C): 36.6 (06-07-23 @ 21:57), Max: 36.6 (06-07-23 @ 21:57)  HR: 96 (06-07-23 @ 21:57) (96 - 96)  BP: 168/85 (06-07-23 @ 21:57) (168/85 - 168/85)  RR: 18 (06-07-23 @ 21:57) (18 - 18)  SpO2: 96% (06-07-23 @ 21:57) (96% - 96%)    GENERAL: patient appears well, no acute distress, conversant  EYES: sclera clear, no exudates  ENMT: oropharynx clear without erythema, no exudates, dry mucous membranes  NECK: supple, soft  LUNGS: good air entry bilaterally, clear to auscultation, symmetric breath sounds, no wheezing or rhonchi appreciated  HEART: soft S1/S2, regular rate and rhythm, no murmurs noted, trace bilateral lower extremity edema  GASTROINTESTINAL: abdomen is soft, tender to palpation of the right lower and left lower quadrants, distended, normoactive bowel sounds, stool palpated   INTEGUMENT: excoriations of bilateral lower extremities with faint erythema of the left extremity  MUSCULOSKELETAL: no clubbing or cyanosis, no obvious deformity  NEUROLOGIC: awake, alert, oriented x3, good muscle tone in 4 extremities, no obvious sensory deficits  PSYCHIATRIC: mood is good, affect is congruent, linear and logical thought process

## 2023-06-08 NOTE — H&P ADULT - PROBLEM SELECTOR PLAN 4
Chronic  - On Lasix 40mg daily, continue inpatient  - Reports normal cardiac work up in the past, no documented history of heart failure  - Notes lower extremity swelling began after pelvic surgery Chronic  - On Lasix 40mg daily, will hold for now - as patient feels dehydrated  - Reports normal cardiac work up in the past, no documented history of heart failure  - Notes lower extremity swelling began after pelvic surgery

## 2023-06-08 NOTE — ED ADULT NURSE REASSESSMENT NOTE - NS ED NURSE REASSESS COMMENT FT1
received Pt a/ox4, reports abdominal pain and constipation, refused enemas because of rectal tenderness.  No other signs of acute distress noted.
Pt received in bed alert and oriented and resting in bed. Pt admitted and verbal report given to RN Carolyn

## 2023-06-08 NOTE — ED ADULT NURSE REASSESSMENT NOTE - NSFALLUNIVINTERV_ED_ALL_ED
Bed/Stretcher in lowest position, wheels locked, appropriate side rails in place/Call bell, personal items and telephone in reach/Instruct patient to call for assistance before getting out of bed/chair/stretcher/Non-slip footwear applied when patient is off stretcher/[place] to call system/Physically safe environment - no spills, clutter or unnecessary equipment/Purposeful proactive rounding/Room/bathroom lighting operational, light cord in reach
Bed/Stretcher in lowest position, wheels locked, appropriate side rails in place/Call bell, personal items and telephone in reach/Instruct patient to call for assistance before getting out of bed/chair/stretcher/Non-slip footwear applied when patient is off stretcher/[place] to call system/Physically safe environment - no spills, clutter or unnecessary equipment/Purposeful proactive rounding/Room/bathroom lighting operational, light cord in reach

## 2023-06-08 NOTE — H&P ADULT - PROBLEM SELECTOR PLAN 7
Chronic  - Patient reports sinus infection for the past 20 years  - On daily maintenance therapy with Augmentin 500mg-125mg

## 2023-06-08 NOTE — CARE COORDINATION ASSESSMENT. - NSPASTMEDSURGHISTORY_GEN_ALL_CORE_FT
PAST MEDICAL & SURGICAL HISTORY:  Chronic Back Pain      S/P TKR (Total Knee Replacement)  Right 2015      S/P Appendectomy  1968      S/P Cholecystectomy  1968      S/P Hysterectomy  1987      Diabetes      MRSA colonization      Osteoarthritis      Constipation      Lumbar back pain      Spastic colon      Cataract      Edema      Hypothyroidism      [place] filter in place  2009      H/O sinus surgery  2/2017      H/O Spinal surgery  2009      S/P TKR (total knee replacement)  Left 2009

## 2023-06-08 NOTE — H&P ADULT - NSHPREVIEWOFSYSTEMS_GEN_ALL_CORE
CONSTITUTIONAL: denies fever, chills, fatigue, weakness  HEENT: denies blurred vision, sore throat  SKIN: denies new lesions, rash  CARDIOVASCULAR: denies chest pain, chest pressure, palpitations  RESPIRATORY: denies shortness of breath, sputum production  GASTROINTESTINAL: admits nausea, vomiting, constipation, abdominal pain, rectal bleeding  GENITOURINARY: denies dysuria, discharge  NEUROLOGICAL: denies numbness, headache, focal weakness  MUSCULOSKELETAL: admits chronic low back pain, denies new joint pain, muscle aches

## 2023-06-08 NOTE — H&P ADULT - NSICDXPASTSURGICALHX_GEN_ALL_CORE_FT
PAST SURGICAL HISTORY:  [place] filter in place 2009    H/O sinus surgery 2/2017    H/O Spinal surgery 2009    S/P Appendectomy 1968    S/P Cholecystectomy 1968    S/P Hysterectomy 1987    S/P TKR (Total Knee Replacement) Right 2015    S/P TKR (total knee replacement) Left 2009

## 2023-06-08 NOTE — H&P ADULT - ATTENDING COMMENTS
83y female with PMHx of hypothyroidism, IBS-constipation predominant who presents to the ED with abdominal pain and constipation, admitted with constipation and stercoral proctitis. Admit to medicine. Aggressive bowel regimen. Will give one bolus of IV fluids and hold home lasix for now. Pain control with tylenol PRN - will give 1x dose of ofirmev. GI consulted Dr. Fowler group. Monitor for BM. Urinating but states decreased due to her severe constipation. Stercoral colitis likely from significant fecal load - would consider mesalamine suppository pending GI recs. Trend WBC - doubt infection at this time. D/w patient at bedside - agrees with plan. GI follow up.    Agree with H&P as outlined above, edited where appropriate.

## 2023-06-08 NOTE — H&P ADULT - PROBLEM SELECTOR PLAN 2
CT abdomen/pelvis: large fecal load with stercoral proctitis, no obstruction  - Patient with leukocytosis to 15.12  - Afebrile   - Would monitor off antibiotics at this time CT abdomen/pelvis: large fecal load with stercoral proctitis, no obstruction  - Patient with leukocytosis to 15.12  - Afebrile   - Would monitor off antibiotics at this time  - Would consider mesalamine suppository, but will defer to GI given questionable allergies to NSAIDs (particularly Advil which she reports a seizure from). Of note, she does take meloxicam without issue.

## 2023-06-08 NOTE — H&P ADULT - ASSESSMENT
82yo female with PMH of hypothyroidism, IBS-constipation predominant who presents to the ED with abdominal pain and constipation, admitted with constipation and stercoral proctitis.  84yo female with PMH of hypothyroidism, IBS-constipation predominant who presents to the ED with abdominal pain and constipation, admitted with constipation and stercoral proctitis.

## 2023-06-08 NOTE — H&P ADULT - HISTORY OF PRESENT ILLNESS
82yo female with PMH of hypothyroidism, IBS-constipation predominant who presents to the ED with abdominal pain and constipation. Patient endorses a history of chronic constipation and is dependent on Linzess, stool softeners and lactulose. Last bowel movement one week ago.     In the ED:  Vitals: Temp 97.8F | HR 96 | /95 | RR 18 | SpO2 96% on RA  Labs: WBC 15.12  CXR:   CT abdomen/pelvis: large fecal load with stercoral proctitis, no obstruction  ECG:  Received: Smog enema, manual disimpaction  84yo female with PMH of CVA, hypothyroidism, IBS-C, prediabetes, chronic lower extremity swelling who presents to the ED with abdominal pain and constipation. Patient endorses a history of chronic constipation and is dependent on Linzess, stool softeners and lactulose which she must take daily to have a bowel movement. She normally has a liquid-consistent bowel movement daily. Her last bowel movement one week ago. She reports lower abdominal pain that began this morning, mostly over the right and left lower quadrants in a cramping fashion. She admits to some drops of bright red blood per rectum as she is straining to have a bowel movement. Admits one episode of vomiting this morning, yellow-fluid consistency. She called her GI doctor as she was concerned that she was impacted and he declined disimpacting the patient. She then tried taking Linzess, 2 stool softeners, 4 pills of Ex-lax, 1 tablespoon of lactulose and magnesium oxide and 2 suppositories today with no bowel movement so she came to the ED for further management.     In the ED:  Vitals: Temp 97.8F | HR 96 | /95 | RR 18 | SpO2 96% on RA  Labs: WBC 15.12  CT abdomen/pelvis: large fecal load with stercoral proctitis, no obstruction  Received: Smog enema, manual disimpaction  84yo female with PMH of CVA, hypothyroidism, IBS-C, prediabetes, chronic lower extremity swelling who presents to the ED with abdominal pain and constipation. Patient endorses a history of chronic constipation and is dependent on Linzess, stool softeners and lactulose which she must take daily to have a bowel movement. She normally has a liquid-consistent bowel movement daily. Her last bowel movement one week ago. She reports lower abdominal pain that began this morning, mostly over the right and left lower quadrants in a cramping fashion. She admits to some drops of bright red blood per rectum as she is straining to have a bowel movement. Admits one episode of vomiting this morning, yellow-fluid consistency. She called her GI doctor as she was concerned that she was impacted and he declined disimpacting the patient. She then tried taking Linzess, 2 stool softeners, 4 pills of Ex-lax, 1 tablespoon of lactulose and magnesium oxide and 2 suppositories today with no bowel movement so she came to the ED for further management. States that she had a colonoscopy at age 52 and never again after this - stating that she is unable to tolerate the prep. Refused SMOG enema on admission and had unsuccessful attempt of disimpaction by ER. Reporting 7/10 pain - has multiple allergies and willing to trial ofirmev for pain - knows opiates will constipate her further.    In the ED:  Vitals: Temp 97.8F | HR 96 | /95 | RR 18 | SpO2 96% on RA  Labs: WBC 15.12  CT abdomen/pelvis: large fecal load with stercoral proctitis, no obstruction  Received: Smog enema, manual disimpaction  84yo female with PMH of CVA, hypothyroidism, IBS-C, prediabetes, chronic lower extremity swelling who presents to the ED with abdominal pain and constipation. Patient endorses a history of chronic constipation and is dependent on Linzess, stool softeners and lactulose which she must take daily to have a bowel movement. She normally has a liquid-consistent bowel movement daily. Her last bowel movement one week ago. She reports lower abdominal pain that began this morning, mostly over the right and left lower quadrants in a cramping fashion. She admits to some drops of bright red blood per rectum as she is straining to have a bowel movement. Admits one episode of vomiting this morning, yellow-fluid consistency. She called her GI doctor as she was concerned that she was impacted and he declined disimpacting the patient. She then tried taking Linzess, 2 stool softeners, 4 pills of Ex-lax, 1 tablespoon of lactulose and magnesium oxide and 2 suppositories today with no bowel movement so she came to the ED for further management. States that she had a colonoscopy at age 52 and never again after this - stating that she is unable to tolerate the prep. Refused SMOG enema on admission and had unsuccessful attempt of disimpaction by ER. Reporting 7/10 pain - has multiple allergies and willing to trial ofirmev for pain - knows opiates will constipate her further.    In the ED:  Vitals: Temp 97.8F | HR 96 | /95 | RR 18 | SpO2 96% on RA  Labs: WBC 15.12  CT abdomen/pelvis: large fecal load with stercoral proctitis, no obstruction  Refused SMOG enema in ER.

## 2023-06-08 NOTE — H&P ADULT - PROBLEM SELECTOR PLAN 5
History of CVA in 9/2021, admitted to [facility] hospital  - No longer on statin  - Continue ASA 81mg inpatient

## 2023-06-08 NOTE — H&P ADULT - PROBLEM SELECTOR PLAN 3
Chronic  - Continue home synthroid 100mcg daily   - Check TFTs inpatient to ensure no hypothyroidism-induced constipation

## 2023-06-08 NOTE — CARE COORDINATION ASSESSMENT. - OTHER PERTINENT DISCHARGE PLANNING INFORMATION:
Presents to ED with c/o abd pain, no bm.  Pt came from MD office, not receiving services PTA and no DME.  Pt states she is independent with ADL and does not anticipate need for services upon discharge.  Given IV tylenol in ED and bowel regimen.

## 2023-06-08 NOTE — H&P ADULT - NSHPSOCIALHISTORY_GEN_ALL_CORE
Denies use of etoh, tobacco and drugs    Lives with son  Occupation:  Alcohol: Denies past or present use  Tobacco: Denies past or present use  Drugs: Denies past or present use    Lives with son (owns her home)  Ambulates without assistive devices  Performs ADLs independently

## 2023-06-08 NOTE — H&P ADULT - PROBLEM SELECTOR PLAN 1
Patient presenting to the ED with constipation for one week   - Admit to [facility]  - CT abdomen/pelvis: large fecal load with stercoral proctitis, no obstruction  - Attempted manual disimpaction in ED which was unsuccessful in procuring bowel movement, likely as impaction is too proximal  - Patient refusing smog enema as she took two enemas at home which caused some rectal bleeding from her hemorrhoids and does not wish to reattempt another enema  - Continue home linzess, lactulose inpatient  - Add senna, miralax  - Would consider mesalamine suppository, but does have allergies to NSAIDs (particularly Advil which she reports a seizure from). Of note, she does take meloxicam without issue  - GI (Dr. Farrar) consulted, f/u recs Patient presenting to the ED with constipation for one week   - Admit to [facility]  - CT abdomen/pelvis: large fecal load with stercoral proctitis, no obstruction  - Attempted manual disimpaction in ED which was unsuccessful in procuring bowel movement, likely as impaction is too proximal  - Patient refusing smog enema as she took two enemas at home which caused some rectal bleeding from her hemorrhoids and does not wish to reattempt another enema  - Continue home linzess, lactulose inpatient  - Add senna, miralax BID  - GI (Dr. Farrar) consulted, f/u recs

## 2023-06-08 NOTE — CARE COORDINATION ASSESSMENT. - NSCAREPROVIDERS_GEN_ALL_CORE_FT
done CARE PROVIDERS:  Accepting Physician: Marvin Dangelo  Admitting: Marvin Dangelo  Attending: Werner Nath  Case Management: José Miguel Juarez  Consultant: Oc Farrar  Consultant: Radha Nath  Covering Team: Taylor Markham  Covering Team: Murray Santillan ED Attending: Kavitha Bolivar ED Nurse: Glenna Borrego  Nurse: Rosina Villarreal  Nurse: Marisol Noriega  Nurse: Ana Jung  Nurse: Kallie Chambers  Ordered: ADM, User  Outpatient Provider: Denzel August  Override: Kallie Chambers  PCA/Nursing Assistant: Elizabeth Kwan  PCA/Nursing Assistant: Jordan Martinez  Primary Team: Arron Galvin  Primary Team: Werner Nath  Primary Team: Nikky Navas  Respiratory Therapy: Pilar Rosen  : Yamilka Pendleton  Team: ALLY GRIER Gastroenter[facility], Team  UR// Supp. Assoc.: Samira Boss

## 2023-06-08 NOTE — H&P ADULT - PROBLEM SELECTOR PLAN 6
Chronic  - Reports A1c 5.8 with primary care doctor  - On metformin 500mg BID, which she reports that she uses for weight management   - Hold oral hypoglycemics inpatient  - Monitor blood glucose with daily BMP

## 2023-06-08 NOTE — PATIENT PROFILE ADULT - FALL HARM RISK - UNIVERSAL INTERVENTIONS
Last seen:9/21/20  Next appointment:1/11/20      
aliya all pertinent systems normal
Bed in lowest position, wheels locked, appropriate side rails in place/Call bell, personal items and telephone in reach/Instruct patient to call for assistance before getting out of bed or chair/Non-slip footwear when patient is out of bed/[place] to call system/Physically safe environment - no spills, clutter or unnecessary equipment/Purposeful Proactive Rounding/Room/bathroom lighting operational, light cord in reach

## 2023-06-08 NOTE — H&P ADULT - NSHPLABSRESULTS_GEN_ALL_CORE
.  CXR - elevated right hemidiaphragm, ?infiltrates/lung changes bilaterally improved from last study (personally reviewed - pending RADS report)    EKG - still pending.    All available data personally reviewed by attending physician.

## 2023-06-09 PROCEDURE — 99233 SBSQ HOSP IP/OBS HIGH 50: CPT

## 2023-06-09 RX ORDER — LACTULOSE 10 G/15ML
30 SOLUTION ORAL
Refills: 0 | Status: DISCONTINUED | OUTPATIENT
Start: 2023-06-09 | End: 2023-06-12

## 2023-06-09 RX ADMIN — Medication 100 MICROGRAM(S): at 05:27

## 2023-06-09 RX ADMIN — Medication 1 SUPPOSITORY(S): at 21:16

## 2023-06-09 RX ADMIN — LACTULOSE 20 GRAM(S): 10 SOLUTION ORAL at 05:27

## 2023-06-09 RX ADMIN — ENOXAPARIN SODIUM 40 MILLIGRAM(S): 100 INJECTION SUBCUTANEOUS at 05:26

## 2023-06-09 RX ADMIN — SENNA PLUS 2 TABLET(S): 8.6 TABLET ORAL at 21:16

## 2023-06-09 RX ADMIN — ONDANSETRON 4 MILLIGRAM(S): 8 TABLET, FILM COATED ORAL at 09:01

## 2023-06-09 RX ADMIN — POLYETHYLENE GLYCOL 3350 17 GRAM(S): 17 POWDER, FOR SOLUTION ORAL at 05:26

## 2023-06-09 RX ADMIN — Medication 81 MILLIGRAM(S): at 11:29

## 2023-06-09 RX ADMIN — ONDANSETRON 4 MILLIGRAM(S): 8 TABLET, FILM COATED ORAL at 21:42

## 2023-06-09 RX ADMIN — POLYETHYLENE GLYCOL 3350 17 GRAM(S): 17 POWDER, FOR SOLUTION ORAL at 18:31

## 2023-06-09 RX ADMIN — LACTULOSE 30 GRAM(S): 10 SOLUTION ORAL at 18:31

## 2023-06-10 DIAGNOSIS — R50.9 FEVER, UNSPECIFIED: ICD-10-CM

## 2023-06-10 LAB
ALBUMIN SERPL ELPH-MCNC: 3 G/DL — LOW (ref 3.3–5)
ALP SERPL-CCNC: 112 U/L — SIGNIFICANT CHANGE UP (ref 40–120)
ALT FLD-CCNC: 39 U/L — SIGNIFICANT CHANGE UP (ref 12–78)
ANION GAP SERPL CALC-SCNC: 6 MMOL/L — SIGNIFICANT CHANGE UP (ref 5–17)
AST SERPL-CCNC: 28 U/L — SIGNIFICANT CHANGE UP (ref 15–37)
BASOPHILS # BLD AUTO: 0.03 K/UL — SIGNIFICANT CHANGE UP (ref 0–0.2)
BASOPHILS NFR BLD AUTO: 0.2 % — SIGNIFICANT CHANGE UP (ref 0–2)
BILIRUB SERPL-MCNC: 0.6 MG/DL — SIGNIFICANT CHANGE UP (ref 0.2–1.2)
BUN SERPL-MCNC: 13 MG/DL — SIGNIFICANT CHANGE UP (ref 7–23)
CALCIUM SERPL-MCNC: 8.5 MG/DL — SIGNIFICANT CHANGE UP (ref 8.5–10.1)
CHLORIDE SERPL-SCNC: 99 MMOL/L — SIGNIFICANT CHANGE UP (ref 96–108)
CO2 SERPL-SCNC: 28 MMOL/L — SIGNIFICANT CHANGE UP (ref 22–31)
CREAT SERPL-MCNC: 0.76 MG/DL — SIGNIFICANT CHANGE UP (ref 0.5–1.3)
EGFR: 79 ML/MIN/1.73M2 — SIGNIFICANT CHANGE UP
EOSINOPHIL # BLD AUTO: 0.03 K/UL — SIGNIFICANT CHANGE UP (ref 0–0.5)
EOSINOPHIL NFR BLD AUTO: 0.2 % — SIGNIFICANT CHANGE UP (ref 0–6)
GLUCOSE SERPL-MCNC: 187 MG/DL — HIGH (ref 70–99)
HCT VFR BLD CALC: 34.9 % — SIGNIFICANT CHANGE UP (ref 34.5–45)
HGB BLD-MCNC: 11.4 G/DL — LOW (ref 11.5–15.5)
IMM GRANULOCYTES NFR BLD AUTO: 0.3 % — SIGNIFICANT CHANGE UP (ref 0–0.9)
LACTATE SERPL-SCNC: 1 MMOL/L — SIGNIFICANT CHANGE UP (ref 0.7–2)
LYMPHOCYTES # BLD AUTO: 1.23 K/UL — SIGNIFICANT CHANGE UP (ref 1–3.3)
LYMPHOCYTES # BLD AUTO: 9.8 % — LOW (ref 13–44)
MCHC RBC-ENTMCNC: 31.6 PG — SIGNIFICANT CHANGE UP (ref 27–34)
MCHC RBC-ENTMCNC: 32.7 GM/DL — SIGNIFICANT CHANGE UP (ref 32–36)
MCV RBC AUTO: 96.7 FL — SIGNIFICANT CHANGE UP (ref 80–100)
MONOCYTES # BLD AUTO: 1.33 K/UL — HIGH (ref 0–0.9)
MONOCYTES NFR BLD AUTO: 10.6 % — SIGNIFICANT CHANGE UP (ref 2–14)
NEUTROPHILS # BLD AUTO: 9.84 K/UL — HIGH (ref 1.8–7.4)
NEUTROPHILS NFR BLD AUTO: 78.9 % — HIGH (ref 43–77)
NRBC # BLD: 0 /100 WBCS — SIGNIFICANT CHANGE UP (ref 0–0)
PLATELET # BLD AUTO: 258 K/UL — SIGNIFICANT CHANGE UP (ref 150–400)
POTASSIUM SERPL-MCNC: 3.8 MMOL/L — SIGNIFICANT CHANGE UP (ref 3.5–5.3)
POTASSIUM SERPL-SCNC: 3.8 MMOL/L — SIGNIFICANT CHANGE UP (ref 3.5–5.3)
PROT SERPL-MCNC: 6.1 G/DL — SIGNIFICANT CHANGE UP (ref 6–8.3)
RAPID RVP RESULT: SIGNIFICANT CHANGE UP
RBC # BLD: 3.61 M/UL — LOW (ref 3.8–5.2)
RBC # FLD: 12.4 % — SIGNIFICANT CHANGE UP (ref 10.3–14.5)
SARS-COV-2 RNA SPEC QL NAA+PROBE: SIGNIFICANT CHANGE UP
SODIUM SERPL-SCNC: 133 MMOL/L — LOW (ref 135–145)
WBC # BLD: 12.5 K/UL — HIGH (ref 3.8–10.5)
WBC # FLD AUTO: 12.5 K/UL — HIGH (ref 3.8–10.5)

## 2023-06-10 PROCEDURE — 99233 SBSQ HOSP IP/OBS HIGH 50: CPT

## 2023-06-10 RX ORDER — SODIUM CHLORIDE 9 MG/ML
1000 INJECTION INTRAMUSCULAR; INTRAVENOUS; SUBCUTANEOUS
Refills: 0 | Status: DISCONTINUED | OUTPATIENT
Start: 2023-06-10 | End: 2023-06-11

## 2023-06-10 RX ADMIN — Medication 100 MICROGRAM(S): at 05:50

## 2023-06-10 RX ADMIN — Medication 650 MILLIGRAM(S): at 14:02

## 2023-06-10 RX ADMIN — SODIUM CHLORIDE 50 MILLILITER(S): 9 INJECTION INTRAMUSCULAR; INTRAVENOUS; SUBCUTANEOUS at 15:29

## 2023-06-10 RX ADMIN — Medication 650 MILLIGRAM(S): at 12:39

## 2023-06-10 RX ADMIN — POLYETHYLENE GLYCOL 3350 17 GRAM(S): 17 POWDER, FOR SOLUTION ORAL at 05:51

## 2023-06-10 RX ADMIN — Medication 81 MILLIGRAM(S): at 11:05

## 2023-06-10 RX ADMIN — LACTULOSE 30 GRAM(S): 10 SOLUTION ORAL at 05:50

## 2023-06-10 RX ADMIN — ENOXAPARIN SODIUM 40 MILLIGRAM(S): 100 INJECTION SUBCUTANEOUS at 05:51

## 2023-06-10 NOTE — CONSULT NOTE ADULT - SUBJECTIVE AND OBJECTIVE BOX
[facility]  Dayo Nath PA-C  [address] [place] [id number] [phone]      Chief Complaint:  Patient is a 83y old  Female who presents with a chief complaint of Constipation, stercoral proctitis (08 Jun 2023 11:15)      HPI:  84yo female with PMH of CVA, hypothyroidism, IBS-C, prediabetes, chronic lower extremity swelling who presents to the ED with abdominal pain and constipation. Patient endorses a history of chronic constipation and is dependent on Linzess, stool softeners and lactulose which she must take daily to have a bowel movement. She normally has a liquid-consistent bowel movement daily. Her last bowel movement one week ago. She reports lower abdominal pain that began this morning, mostly over the right and left lower quadrants in a cramping fashion. She admits to some drops of bright red blood per rectum as she is straining to have a bowel movement. Admits one episode of vomiting this morning, yellow-fluid consistency. She called her GI doctor as she was concerned that she was impacted and he declined disimpacting the patient. She then tried taking Linzess, 2 stool softeners, 4 pills of Ex-lax, 1 tablespoon of lactulose and magnesium oxide and 2 suppositories today with no bowel movement so she came to the ED for further management. States that she had a colonoscopy at age 52 and never again after this - stating that she is unable to tolerate the prep. Refused SMOG enema on admission and had unsuccessful attempt of disimpaction by ER. Reporting 7/10 pain - has multiple allergies and willing to trial ofirmev for pain - knows opiates will constipate her further.    INTERVAL HPI:  Pt s/e   Reports same history as above  Per patient, she is allergic to colonoscopy prep. Attempted enemas at home and had rectal bleeding. Wishes to hold off on smog enema now.  She states her primary GI is Dr. Tylor Taylor  Currently having abdominal pain and nausea  Otherwise no further complaints    Allergies:  peanuts (Other)  allergic to food coloring (Unknown)  Originally Entered as [Rash] reaction to [oregano spice] (Unknown)  food coloring red/blue, wheat, tide, morphine, ceclor, compazine, advil, mortim, iv contrast, shellfish, oregano, tomoatoes, pork, peas, mold/spores (Unknown)  shellfish (Unknown)  morphine (Unknown)  cephalosporins (Other)  pork (Unknown)  Advil (Unknown)  Ceclor (Other)  ciprofloxacin (Other)  Motrin (Unknown)  Compazine (Other)      Medications:  acetaminophen     Tablet .. 650 milliGRAM(s) Oral every 6 hours PRN  aspirin enteric coated 81 milliGRAM(s) Oral daily  enoxaparin Injectable 40 milliGRAM(s) SubCutaneous every 24 hours  hydrocortisone hemorrhoidal Suppository 1 Suppository(s) Rectal at bedtime  lactulose Syrup 20 Gram(s) Oral three times a day  levothyroxine 100 MICROGram(s) Oral daily  melatonin 3 milliGRAM(s) Oral at bedtime PRN  ondansetron Injectable 4 milliGRAM(s) IV Push every 8 hours PRN  polyethylene glycol 3350 17 Gram(s) Oral two times a day  senna 2 Tablet(s) Oral at bedtime      PMHX/PSHX:  Chronic Back Pain    Hypothyroidism    Edema    Cataract    Spastic colon    Lumbar back pain    Constipation    Osteoarthritis    MRSA cellulitis    MRSA colonization    Diabetes    S/P Hysterectomy    S/P Cholecystectomy    S/P Appendectomy    S/P TKR (Total Knee Replacement)    S/P TKR (total knee replacement)    H/O Spinal surgery    H/O sinus surgery    [place] filter in place        Family history:  No pertinent family history in first degree relatives    No pertinent family history in first degree relatives    FHx: myocardial infarction (Father)      ROS:     General:  No fevers, chills, night sweats, fatigue  Eyes:  Good vision, no reported pain  ENT:  No sore throat, pain, runny nose, dysphagia  CV:  No pain, palpitations, hypo/hypertension  Resp:  No dyspnea, cough, tachypnea, wheezing  GI:  +pain, +nausea, No vomiting, No diarrhea, No constipation, No weight loss, No fever, No pruritis, No rectal bleeding, No tarry stools, No dysphagia  :  No pain, bleeding, incontinence, nocturia  Muscle:  No pain, weakness  Neuro:  No weakness, tingling, memory problems  Psych:  No fatigue, insomnia, mood problems, depression  Endocrine:  No polyuria, polydipsia, cold/heat intolerance  Heme:  No petechiae, ecchymosis, easy bruisability  Skin:  No rash, tattoos, scars, edema      PHYSICAL EXAM:   Vital Signs:  Vital Signs Last 24 Hrs  T(C): 36.7 (08 Jun 2023 11:42), Max: 37.3 (08 Jun 2023 06:30)  T(F): 98 (08 Jun 2023 11:42), Max: 99.1 (08 Jun 2023 06:30)  HR: 87 (08 Jun 2023 11:42) (80 - 96)  BP: 123/73 (08 Jun 2023 11:42) (103/66 - 168/85)  BP(mean): --  RR: 16 (08 Jun 2023 11:42) (15 - 18)  SpO2: 94% (08 Jun 2023 11:42) (93% - 96%)    Parameters below as of 08 Jun 2023 11:42  Patient On (Oxygen Delivery Method): room air      Daily Height in cm: 157.48 (07 Jun 2023 21:57)    Daily     GENERAL:  Appears stated age  HEENT:  NC/AT  CHEST:  Full & symmetric excursion  HEART:  Regular rhythm  ABDOMEN:  Soft, non-tender, non-distended  EXTREMITIES:  no cyanosis, clubbing or edema  SKIN:  No rash  NEURO:  Alert,    LABS:                        12.1   11.07 )-----------( 237      ( 08 Jun 2023 04:28 )             37.6     06-08    141  |  111<H>  |  14  ----------------------------<  89  4.1   |  25  |  0.51    Ca    8.5      08 Jun 2023 04:28    TPro  5.7<L>  /  Alb  3.1<L>  /  TBili  0.5  /  DBili  x   /  AST  28  /  ALT  45  /  AlkPhos  94  06-08    LIVER FUNCTIONS - ( 08 Jun 2023 04:28 )  Alb: 3.1 g/dL / Pro: 5.7 g/dL / ALK PHOS: 94 U/L / ALT: 45 U/L / AST: 28 U/L / GGT: x               Lipase [id number]        Imaging:  < from: CT Abdomen and Pelvis No Cont (06.08.23 @ 00:16) >    ACC: [id number] EXAM:  CT ABDOMEN AND PELVIS   ORDERED BY: AMANDA EVANS     PROCEDURE DATE:  06/08/2023          INTERPRETATION:  CLINICAL INFORMATION: Abdominal pain. One week without   bowel movements.    COMPARISON: None.    CONTRAST/COMPLICATIONS:  IV Contrast: NONE  Oral Contrast: NONE  Complications: None reported at time of study completion    PROCEDURE:  CT of the Abdomen and Pelvis was performed.  Sagittal and coronal reformats were performed.    FINDINGS:  LOWER CHEST: Atherosclerotic aortic calcifications. Elevated right   diaphragm..    LIVER: Within normal limits.  BILE DUCTS: Normal caliber.  GALLBLADDER: Cholecystectomy.  SPLEEN: Within normal limits.  PANCREAS: Within normal limits.  ADRENALS: Within normal limits.  KIDNEYS/URETERS: Bilateral nephrolithiasis. No hydronephrosis..    BLADDER: Within normal limits.  REPRODUCTIVE ORGANS: Hysterectomy.    BOWEL: Rectal distention with stool, rectal wall thickening and   perirectal edema. Large fecal load throughout the colon. No bowel   obstruction. Appendix is not visualized. No evidence of inflammation in   the pericecal region.  PERITONEUM: No ascites.  VESSELS: IVC filter. Atherosclerotic calcifications.  RETROPERITONEUM/LYMPH NODES: No lymphadenopathy.  ABDOMINAL WALLright gluteal calcified granuloma..  BONES: Long segment posterior thoracic and lumbosacral spinal fusion.   Bilateral sacroiliac screw fixation.    IMPRESSION:  Large fecal load with stercoral proctitis. No bowel obstruction.    --- End of Report ---      ISSA MARES MD; Attending Radiologist  This document has been electronically signed. Jun 8 2023  1:00AM    < end of copied text >  
[facility], Division of Infectious Diseases  [facility], Y. [facility], [facility]  [phone]    MICHEL TRENT  83y, Female  [id number]    HPI--  HPI:  82yo female with PMH of CVA, hypothyroidism, IBS-C, prediabetes, chronic lower extremity swelling who presents to the ED with abdominal pain and constipation. Patient endorses a history of chronic constipation and is dependent on Linzess, stool softeners and lactulose which she must take daily to have a bowel movement. She normally has a liquid-consistent bowel movement daily. Her last bowel movement one week ago. She reports lower abdominal pain that began this morning, mostly over the right and left lower quadrants in a cramping fashion. She admits to some drops of bright red blood per rectum as she is straining to have a bowel movement. Admits one episode of vomiting this morning, yellow-fluid consistency. She called her GI doctor as she was concerned that she was impacted and he declined disimpacting the patient. She then tried taking Linzess, 2 stool softeners, 4 pills of Ex-lax, 1 tablespoon of lactulose and magnesium oxide and 2 suppositories today with no bowel movement so she came to the ED for further management. States that she had a colonoscopy at age 52 and never again after this - stating that she is unable to tolerate the prep. Refused SMOG enema on admission and had unsuccessful attempt of disimpaction by ER. Reporting 7/10 pain - has multiple allergies and willing to trial ofirmev for pain - knows opiates will constipate her further.    In the ED:  Vitals: Temp 97.8F | HR 96 | /95 | RR 18 | SpO2 96% on RA  Labs: WBC 15.12  CT abdomen/pelvis: large fecal load with stercoral proctitis, no obstruction  Refused SMOG enema in ER. (08 Jun 2023 01:45)    ID c/s on HD#2 for fever 102.5F rectally  Pt seen at bedside  No complaints  Feeling better than prior    Active Medications--  acetaminophen     Tablet .. 650 milliGRAM(s) Oral every 6 hours PRN  aspirin enteric coated 81 milliGRAM(s) Oral daily  enoxaparin Injectable 40 milliGRAM(s) SubCutaneous every 24 hours  hydrocortisone hemorrhoidal Suppository 1 Suppository(s) Rectal at bedtime  lactulose Syrup 30 Gram(s) Oral four times a day  levothyroxine 100 MICROGram(s) Oral daily  melatonin 3 milliGRAM(s) Oral at bedtime PRN  ondansetron Injectable 4 milliGRAM(s) IV Push every 8 hours PRN  polyethylene glycol 3350 17 Gram(s) Oral two times a day  senna 2 Tablet(s) Oral at bedtime  sodium chloride 0.9%. 1000 milliLiter(s) IV Continuous <Continuous>    Antimicrobials:     Immunologic:     ROS:  CONSTITUTIONAL: No fevers or chills. No weakness or headache. No weight changes.  EYES/ENT: No visual or hearing changes. No sore throat or throat pain .  NECK: No pain or stiffness  RESPIRATORY: No cough, wheezing, or hemoptysis. No shortness of breath  CARDIOVASCULAR: No chest pain or palpitations  GASTROINTESTINAL: No abdominal pain. No nausea or vomiting. No diarrhea or constipation.  GENITOURINARY: No dysuria, frequency or hematuria  NEUROLOGICAL: No numbness or weakness  SKIN: No itching or rashes  PSYCHIATRIC: Pleasant. Appropriate affect    Allergies: peanuts (Other)  allergic to food coloring (Unknown)  Originally Entered as [Rash] reaction to [oregano spice] (Unknown)  food coloring red/blue, wheat, tide, morphine, ceclor, compazine, advil, mortim, iv contrast, shellfish, oregano, tomoatoes, pork, peas, mold/spores (Unknown)  shellfish (Unknown)  morphine (Unknown)  cephalosporins (Other)  pork (Unknown)  Advil (Unknown)  Ceclor (Other)  ciprofloxacin (Other)  Motrin (Unknown)  Compazine (Other)    PMH -- Chronic Back Pain    Hypothyroidism    Edema    Cataract    Spastic colon    Lumbar back pain    Constipation    Osteoarthritis    MRSA cellulitis    MRSA colonization    Diabetes      PSH -- S/P Hysterectomy    S/P Cholecystectomy    S/P Appendectomy    S/P TKR (Total Knee Replacement)    S/P TKR (total knee replacement)    H/O Spinal surgery    H/O sinus surgery    [place] filter in place      FH -- No pertinent family history in first degree relatives    No pertinent family history in first degree relatives    FHx: myocardial infarction (Father)      Social History --  EtOH: denies   Tobacco: denies   Drug Use: denies     Travel/Environmental/Occupational History:    Physical Exam--  Vital Signs Last 24 Hrs  T(F): 99.9 (10 Reese 2023 14:00), Max: 102.5 (10 Reese 2023 12:35)  HR: 83 (10 Reese 2023 12:30) (75 - 83)  BP: 128/76 (10 Reese 2023 12:30) (119/68 - 135/72)  RR: 18 (10 Reese 2023 12:30) (17 - 18)  SpO2: 92% (10 Reese 2023 12:30) (91% - 92%)  General: nontoxic-appearing, no acute distress  HEENT: NC/AT, EOMI, anicteric,  Lungs: Clear bilaterally without rales, wheezing or rhonchi  Heart: Regular rate and rhythm. No murmur, rub or gallop.  Abdomen: Soft. Nondistended. Nontender  Extremities: No cyanosis or clubbing. No edema.   Skin: Warm. Dry. Good turgor. No rash.    Laboratory & Imaging Data:  CBC:                       11.4   12.50 )-----------( 258      ( 10 Reese 2023 13:45 )             34.9     CMP: 06-10    133<L>  |  99  |  13  ----------------------------<  187<H>  3.8   |  28  |  0.76    Ca    8.5      10 Reese 2023 13:45    TPro  6.1  /  Alb  3.0<L>  /  TBili  0.6  /  DBili  x   /  AST  28  /  ALT  39  /  AlkPhos  112  06-10    LIVER FUNCTIONS - ( 10 Reese 2023 13:45 )  Alb: 3.0 g/dL / Pro: 6.1 g/dL / ALK PHOS: 112 U/L / ALT: 39 U/L / AST: 28 U/L / GGT: x               Microbiology: reviewed        Radiology: reviewed    < from: CT Abdomen and Pelvis No Cont (06.08.23 @ 00:16) >    ACC: [id number] EXAM:  CT ABDOMEN AND PELVIS   ORDERED BY: AMANDA EVANS     PROCEDURE DATE:  06/08/2023          INTERPRETATION:  CLINICAL INFORMATION: Abdominal pain. One week without   bowel movements.    COMPARISON: None.    CONTRAST/COMPLICATIONS:  IV Contrast: NONE  Oral Contrast: NONE  Complications: None reported at time of study completion    PROCEDURE:  CT of the Abdomen and Pelvis was performed.  Sagittal and coronal reformats were performed.    FINDINGS:  LOWER CHEST: Atherosclerotic aortic calcifications. Elevated right   diaphragm..    LIVER: Within normal limits.  BILE DUCTS: Normal caliber.  GALLBLADDER: Cholecystectomy.  SPLEEN: Within normal limits.  PANCREAS: Within normal limits.  ADRENALS: Within normal limits.  KIDNEYS/URETERS: Bilateral nephrolithiasis. No hydronephrosis..    BLADDER: Within normal limits.  REPRODUCTIVE ORGANS: Hysterectomy.    BOWEL: Rectal distention with stool, rectal wall thickening and   perirectal edema. Large fecal load throughout the colon. No bowel   obstruction. Appendix is not visualized. No evidence of inflammation in   the pericecal region.  PERITONEUM: No ascites.  VESSELS: IVC filter. Atherosclerotic calcifications.  RETROPERITONEUM/LYMPH NODES: No lymphadenopathy.  ABDOMINAL WALLright gluteal calcified granuloma..  BONES: Long segment posterior thoracic and lumbosacral spinal fusion.   Bilateral sacroiliac screw fixation.    IMPRESSION:  Large fecal load with stercoral proctitis. No bowel obstruction.        --- End of Report ---            ISSA MARES MD; Attending Radiologist  This document has been electronically signed. Jun 8 2023  1:00AM    < end of copied text >

## 2023-06-10 NOTE — CONSULT NOTE ADULT - ASSESSMENT
82yo female with PMH of hypothyroidism, IBS-constipation predominant who presents to the ED with abdominal pain and constipation, admitted with constipation and stercoral proctitis.    Constipation/Stercoral Procitis  Fever on HD#2, leukocytosis  - imaging CT abdomen/pelvis: large fecal load with stercoral proctitis, no obstruction  - s/p unsuccessful manual disimpaction in ED; now w/ BM   - 6/10 febrile to 102.5F rectally  Recommendations:   Pt w/o focal sx. Denies urinary sx, URI sx.   Denies n/v, has some diarrhea now in setting of medication  Suspect fever reactive, but will evaluate  - send infectious w/u: RVP, cultures, GI PCR  Can monitor off Abx at this time    D/w Dr. Nath    Infectious Diseases will continue to follow. Please call with any questions.   Sasha Ness M.D.  \[facility]\"" Division of Infectious Diseases [phone]  
Constipation  Fecal impaction  Stercoral colitis    S/p attempt at manual disimpaction in ER that was unsuccessful  Cont pt's home bowel regimen  Discussed options of colonoscopy bowel prep vs enema  Pt wishes to hold off on both  However if no BM would order moviprep 1L or smog enema    I reviewed the overnight course of events on the unit, re-confirming the patient history. I discussed the care with the patient and their family  Differential diagnosis and plan of care discussed with patient after the evaluation  40 minutes spent on total encounter of which more than fifty percent of the encounter was spent counseling and/or coordinating care by the attending physician.  Advanced care planning was discussed with patient and family.  Advanced care planning forms were reviewed and discussed.  Risks, benefits and alternatives of gastroenterologic procedures were discussed in detail and all questions were answered.

## 2023-06-11 LAB
ANION GAP SERPL CALC-SCNC: 6 MMOL/L — SIGNIFICANT CHANGE UP (ref 5–17)
APPEARANCE UR: ABNORMAL
BASOPHILS # BLD AUTO: 0.05 K/UL — SIGNIFICANT CHANGE UP (ref 0–0.2)
BASOPHILS NFR BLD AUTO: 0.4 % — SIGNIFICANT CHANGE UP (ref 0–2)
BILIRUB UR-MCNC: NEGATIVE — SIGNIFICANT CHANGE UP
BUN SERPL-MCNC: 15 MG/DL — SIGNIFICANT CHANGE UP (ref 7–23)
CALCIUM SERPL-MCNC: 9 MG/DL — SIGNIFICANT CHANGE UP (ref 8.5–10.1)
CHLORIDE SERPL-SCNC: 100 MMOL/L — SIGNIFICANT CHANGE UP (ref 96–108)
CO2 SERPL-SCNC: 30 MMOL/L — SIGNIFICANT CHANGE UP (ref 22–31)
COLOR SPEC: YELLOW — SIGNIFICANT CHANGE UP
CREAT SERPL-MCNC: 0.63 MG/DL — SIGNIFICANT CHANGE UP (ref 0.5–1.3)
DIFF PNL FLD: ABNORMAL
EGFR: 89 ML/MIN/1.73M2 — SIGNIFICANT CHANGE UP
EOSINOPHIL # BLD AUTO: 0.23 K/UL — SIGNIFICANT CHANGE UP (ref 0–0.5)
EOSINOPHIL NFR BLD AUTO: 2 % — SIGNIFICANT CHANGE UP (ref 0–6)
GI PCR PANEL: SIGNIFICANT CHANGE UP
GLUCOSE SERPL-MCNC: 96 MG/DL — SIGNIFICANT CHANGE UP (ref 70–99)
GLUCOSE UR QL: NEGATIVE MG/DL — SIGNIFICANT CHANGE UP
HCT VFR BLD CALC: 35.9 % — SIGNIFICANT CHANGE UP (ref 34.5–45)
HGB BLD-MCNC: 11.6 G/DL — SIGNIFICANT CHANGE UP (ref 11.5–15.5)
IMM GRANULOCYTES NFR BLD AUTO: 0.4 % — SIGNIFICANT CHANGE UP (ref 0–0.9)
KETONES UR-MCNC: 15 MG/DL
LEUKOCYTE ESTERASE UR-ACNC: ABNORMAL
LYMPHOCYTES # BLD AUTO: 1.44 K/UL — SIGNIFICANT CHANGE UP (ref 1–3.3)
LYMPHOCYTES # BLD AUTO: 12.4 % — LOW (ref 13–44)
MCHC RBC-ENTMCNC: 31.3 PG — SIGNIFICANT CHANGE UP (ref 27–34)
MCHC RBC-ENTMCNC: 32.3 GM/DL — SIGNIFICANT CHANGE UP (ref 32–36)
MCV RBC AUTO: 96.8 FL — SIGNIFICANT CHANGE UP (ref 80–100)
MONOCYTES # BLD AUTO: 1.5 K/UL — HIGH (ref 0–0.9)
MONOCYTES NFR BLD AUTO: 13 % — SIGNIFICANT CHANGE UP (ref 2–14)
NEUTROPHILS # BLD AUTO: 8.3 K/UL — HIGH (ref 1.8–7.4)
NEUTROPHILS NFR BLD AUTO: 71.8 % — SIGNIFICANT CHANGE UP (ref 43–77)
NITRITE UR-MCNC: NEGATIVE — SIGNIFICANT CHANGE UP
NRBC # BLD: 0 /100 WBCS — SIGNIFICANT CHANGE UP (ref 0–0)
PH UR: 6 — SIGNIFICANT CHANGE UP (ref 5–8)
PLATELET # BLD AUTO: 275 K/UL — SIGNIFICANT CHANGE UP (ref 150–400)
POTASSIUM SERPL-MCNC: 4.1 MMOL/L — SIGNIFICANT CHANGE UP (ref 3.5–5.3)
POTASSIUM SERPL-SCNC: 4.1 MMOL/L — SIGNIFICANT CHANGE UP (ref 3.5–5.3)
PROT UR-MCNC: 30 MG/DL
RBC # BLD: 3.71 M/UL — LOW (ref 3.8–5.2)
RBC # FLD: 12.4 % — SIGNIFICANT CHANGE UP (ref 10.3–14.5)
SODIUM SERPL-SCNC: 136 MMOL/L — SIGNIFICANT CHANGE UP (ref 135–145)
SP GR SPEC: 1.02 — SIGNIFICANT CHANGE UP (ref 1–1.03)
UROBILINOGEN FLD QL: 0.2 MG/DL — SIGNIFICANT CHANGE UP (ref 0.2–1)
WBC # BLD: 11.57 K/UL — HIGH (ref 3.8–10.5)
WBC # FLD AUTO: 11.57 K/UL — HIGH (ref 3.8–10.5)

## 2023-06-11 PROCEDURE — 99233 SBSQ HOSP IP/OBS HIGH 50: CPT

## 2023-06-11 RX ORDER — FUROSEMIDE 40 MG
20 TABLET ORAL
Refills: 0 | Status: DISCONTINUED | OUTPATIENT
Start: 2023-06-11 | End: 2023-06-12

## 2023-06-11 RX ADMIN — Medication 650 MILLIGRAM(S): at 01:13

## 2023-06-11 RX ADMIN — Medication 100 MICROGRAM(S): at 05:58

## 2023-06-11 RX ADMIN — Medication 650 MILLIGRAM(S): at 02:15

## 2023-06-11 RX ADMIN — ENOXAPARIN SODIUM 40 MILLIGRAM(S): 100 INJECTION SUBCUTANEOUS at 05:58

## 2023-06-11 RX ADMIN — Medication 3 MILLIGRAM(S): at 01:14

## 2023-06-11 RX ADMIN — Medication 81 MILLIGRAM(S): at 12:06

## 2023-06-11 RX ADMIN — Medication 650 MILLIGRAM(S): at 23:31

## 2023-06-11 RX ADMIN — Medication 3 MILLIGRAM(S): at 23:31

## 2023-06-11 NOTE — PROGRESS NOTE ADULT - PROBLEM SELECTOR PLAN 9
had fever 102.5 rectally  RVP, Cultures sent  persist leukocytosis  lactic acid normal  procal pending  GI pcr sent.
had fever 102.5 rectally  RVP negative , blood Culture , Ua and Ucx pending  persist leukocytosis  lactic acid normal  procal minimum elevation   GI pcr negative

## 2023-06-11 NOTE — PROGRESS NOTE ADULT - PROBLEM SELECTOR PLAN 1
Patient presenting to the ED with constipation for one week   - CT abdomen/pelvis: large fecal load with stercoral proctitis, no obstruction  - Attempted manual disimpaction in ED which was unsuccessful in procuring bowel movement, likely as impaction is too proximal  - Continue home linzess, lactulose inpatient  - senna, miralax BID, smog enema   - GI consult noted   -  now + BM
Patient presenting to the ED with constipation for one week   - CT abdomen/pelvis: large fecal load with stercoral proctitis, no obstruction  - Attempted manual disimpaction in ED which was unsuccessful in procuring bowel movement, likely as impaction is too proximal  - Patient refusing smog enema as she took two enemas at home which caused some rectal bleeding from her hemorrhoids and does not wish to reattempt another enema today   - Continue home linzess, lactulose inpatient  - Add senna, miralax BID  - GI (Dr. Farrar) consulted, f/u recs
Patient presenting to the ED with constipation for one week   - CT abdomen/pelvis: large fecal load with stercoral proctitis, no obstruction  - Attempted manual disimpaction in ED which was unsuccessful in procuring bowel movement, likely as impaction is too proximal  - Continue home linzess, lactulose inpatient  - senna, miralax BID, smog enema   - GI consult note  + BM today
Patient presenting to the ED with constipation for one week   - CT abdomen/pelvis: large fecal load with stercoral proctitis, no obstruction  - Attempted manual disimpaction in ED which was unsuccessful in procuring bowel movement, likely as impaction is too proximal  - Continue home linzess, lactulose inpatient  - senna, miralax BID, smog enema   - GI consult noted   -  now + BM

## 2023-06-11 NOTE — PROGRESS NOTE ADULT - PROBLEM SELECTOR PLAN 4
Chronic  - On Lasix 40mg daily, will hold for now - as patient feels dehydrated  - Reports normal cardiac work up in the past, no documented history of heart failure  - Notes lower extremity swelling began after pelvic surgery
Chronic  - On Lasix 40mg daily, will hold for now - as patient feels dehydrated  - Reports normal cardiac work up in the past, no documented history of heart failure  - Notes lower extremity swelling began after pelvic surgery  -  restart lasix
Chronic  - On Lasix 40mg daily, will hold for now - as patient feels dehydrated  - Reports normal cardiac work up in the past, no documented history of heart failure  - Notes lower extremity swelling began after pelvic surgery
Chronic  - On Lasix 40mg daily, will hold for now - as patient feels dehydrated  - Reports normal cardiac work up in the past, no documented history of heart failure  - Notes lower extremity swelling began after pelvic surgery  -  restart lasix once hyponatremia resolves

## 2023-06-11 NOTE — PROGRESS NOTE ADULT - PROBLEM SELECTOR PLAN 7
Chronic  - Patient reports sinus infection for the past 20 years  - On daily maintenance therapy with Augmentin 500mg-125mg
Chronic  - Patient reports sinus infection for the past 20 years  - On daily maintenance therapy with Augmentin 500mg-125mg
Chronic  - Patient reports sinus infection for the past 20 years  - On daily maintenance therapy with Augmentin 500mg-125mg  -no active symptoms
Chronic  - Patient reports sinus infection for the past 20 years  - On daily maintenance therapy with Augmentin 500mg-125mg

## 2023-06-11 NOTE — PROGRESS NOTE ADULT - TIME BILLING
direct patient care including but not limited to reviewing chart, medications ,laboratory data, imaging reports, discussion of plan of care with consultants on the case, coordination of care with multidisciplinary team involved in the case and discussion of plan with patient.  Patient agreeable to plan of care and verbalized understanding the anticipated hospital course and treatment plan.

## 2023-06-11 NOTE — PROGRESS NOTE ADULT - PROBLEM SELECTOR PLAN 2
CT abdomen/pelvis: large fecal load with stercoral proctitis, no obstruction  - Patient with leukocytosis to 15.12, trending down   - Afebrile   - Would monitor off antibiotics at this time
CT abdomen/pelvis: large fecal load with stercoral proctitis, no obstruction  - Patient with leukocytosis to 15.12, trending down   - Afebrile   - Would monitor off antibiotics at this time
CT abdomen/pelvis: large fecal load with stercoral proctitis, no obstruction  - Patient with leukocytosis to 15.12, trending down
CT abdomen/pelvis: large fecal load with stercoral proctitis, no obstruction  - Patient with leukocytosis to 15.12, trending down

## 2023-06-11 NOTE — PROGRESS NOTE ADULT - PROBLEM SELECTOR PLAN 5
History of CVA in 9/2021, admitted to [facility] hospital  - No longer on statin  - Continue ASA 81mg inpatient
History of CVA in 9/2021, admitted to [facility] hospital  - No longer on statin  - Continue ASA 81mg inpatient
History of CVA in 9/2021, admitted to [facility] hospital  - No longer on statin  - Continue ASA 81mg inpatient
History of CVA in 9/2021, admitted to [facility] hospital  - No longer on statin  - Continue ASA 81mg inpatient

## 2023-06-11 NOTE — PROVIDER CONTACT NOTE (OTHER) - BACKGROUND
patient had diarrhea and sodium of 133, lasix held due to sodium  complaining of left ankle swelling and pain  refusing fluids, stating they are making her ankle worse

## 2023-06-11 NOTE — PROGRESS NOTE ADULT - PROBLEM SELECTOR PLAN 3
Chronic  - Continue home synthroid 100mcg daily   -TSH WNL
Chronic  - Continue home synthroid 100mcg daily   - Check TFTs
Chronic  - Continue home synthroid 100mcg daily   - Check TFTs
Chronic  - Continue home synthroid 100mcg daily   -TSH WNL

## 2023-06-12 ENCOUNTER — TRANSCRIPTION ENCOUNTER (OUTPATIENT)
Age: 83
End: 2023-06-12

## 2023-06-12 VITALS
RESPIRATION RATE: 18 BRPM | DIASTOLIC BLOOD PRESSURE: 76 MMHG | HEART RATE: 80 BPM | OXYGEN SATURATION: 92 % | SYSTOLIC BLOOD PRESSURE: 166 MMHG | TEMPERATURE: 99 F

## 2023-06-12 LAB
ANION GAP SERPL CALC-SCNC: 6 MMOL/L — SIGNIFICANT CHANGE UP (ref 5–17)
BASOPHILS # BLD AUTO: 0.04 K/UL — SIGNIFICANT CHANGE UP (ref 0–0.2)
BASOPHILS NFR BLD AUTO: 0.4 % — SIGNIFICANT CHANGE UP (ref 0–2)
BUN SERPL-MCNC: 14 MG/DL — SIGNIFICANT CHANGE UP (ref 7–23)
CALCIUM SERPL-MCNC: 9 MG/DL — SIGNIFICANT CHANGE UP (ref 8.5–10.1)
CHLORIDE SERPL-SCNC: 102 MMOL/L — SIGNIFICANT CHANGE UP (ref 96–108)
CO2 SERPL-SCNC: 31 MMOL/L — SIGNIFICANT CHANGE UP (ref 22–31)
CREAT SERPL-MCNC: 0.6 MG/DL — SIGNIFICANT CHANGE UP (ref 0.5–1.3)
EGFR: 90 ML/MIN/1.73M2 — SIGNIFICANT CHANGE UP
EOSINOPHIL # BLD AUTO: 0.23 K/UL — SIGNIFICANT CHANGE UP (ref 0–0.5)
EOSINOPHIL NFR BLD AUTO: 2.5 % — SIGNIFICANT CHANGE UP (ref 0–6)
GLUCOSE SERPL-MCNC: 114 MG/DL — HIGH (ref 70–99)
HCT VFR BLD CALC: 34 % — LOW (ref 34.5–45)
HGB BLD-MCNC: 11.1 G/DL — LOW (ref 11.5–15.5)
IMM GRANULOCYTES NFR BLD AUTO: 0.4 % — SIGNIFICANT CHANGE UP (ref 0–0.9)
LYMPHOCYTES # BLD AUTO: 1.35 K/UL — SIGNIFICANT CHANGE UP (ref 1–3.3)
LYMPHOCYTES # BLD AUTO: 14.8 % — SIGNIFICANT CHANGE UP (ref 13–44)
MCHC RBC-ENTMCNC: 30.9 PG — SIGNIFICANT CHANGE UP (ref 27–34)
MCHC RBC-ENTMCNC: 32.6 GM/DL — SIGNIFICANT CHANGE UP (ref 32–36)
MCV RBC AUTO: 94.7 FL — SIGNIFICANT CHANGE UP (ref 80–100)
MONOCYTES # BLD AUTO: 1.15 K/UL — HIGH (ref 0–0.9)
MONOCYTES NFR BLD AUTO: 12.6 % — SIGNIFICANT CHANGE UP (ref 2–14)
NEUTROPHILS # BLD AUTO: 6.31 K/UL — SIGNIFICANT CHANGE UP (ref 1.8–7.4)
NEUTROPHILS NFR BLD AUTO: 69.3 % — SIGNIFICANT CHANGE UP (ref 43–77)
NRBC # BLD: 0 /100 WBCS — SIGNIFICANT CHANGE UP (ref 0–0)
PLATELET # BLD AUTO: 310 K/UL — SIGNIFICANT CHANGE UP (ref 150–400)
POTASSIUM SERPL-MCNC: 3.9 MMOL/L — SIGNIFICANT CHANGE UP (ref 3.5–5.3)
POTASSIUM SERPL-SCNC: 3.9 MMOL/L — SIGNIFICANT CHANGE UP (ref 3.5–5.3)
RBC # BLD: 3.59 M/UL — LOW (ref 3.8–5.2)
RBC # FLD: 12.1 % — SIGNIFICANT CHANGE UP (ref 10.3–14.5)
SODIUM SERPL-SCNC: 139 MMOL/L — SIGNIFICANT CHANGE UP (ref 135–145)
WBC # BLD: 9.12 K/UL — SIGNIFICANT CHANGE UP (ref 3.8–10.5)
WBC # FLD AUTO: 9.12 K/UL — SIGNIFICANT CHANGE UP (ref 3.8–10.5)

## 2023-06-12 PROCEDURE — 80048 BASIC METABOLIC PNL TOTAL CA: CPT

## 2023-06-12 PROCEDURE — 96372 THER/PROPH/DIAG INJ SC/IM: CPT

## 2023-06-12 PROCEDURE — 83036 HEMOGLOBIN GLYCOSYLATED A1C: CPT

## 2023-06-12 PROCEDURE — 84145 PROCALCITONIN (PCT): CPT

## 2023-06-12 PROCEDURE — 83605 ASSAY OF LACTIC ACID: CPT

## 2023-06-12 PROCEDURE — 36415 COLL VENOUS BLD VENIPUNCTURE: CPT

## 2023-06-12 PROCEDURE — 87507 IADNA-DNA/RNA PROBE TQ 12-25: CPT

## 2023-06-12 PROCEDURE — 93005 ELECTROCARDIOGRAM TRACING: CPT

## 2023-06-12 PROCEDURE — 80053 COMPREHEN METABOLIC PANEL: CPT

## 2023-06-12 PROCEDURE — 74176 CT ABD & PELVIS W/O CONTRAST: CPT | Mod: MA

## 2023-06-12 PROCEDURE — 87086 URINE CULTURE/COLONY COUNT: CPT

## 2023-06-12 PROCEDURE — 85025 COMPLETE CBC W/AUTO DIFF WBC: CPT

## 2023-06-12 PROCEDURE — 87040 BLOOD CULTURE FOR BACTERIA: CPT

## 2023-06-12 PROCEDURE — 96375 TX/PRO/DX INJ NEW DRUG ADDON: CPT

## 2023-06-12 PROCEDURE — 99239 HOSP IP/OBS DSCHRG MGMT >30: CPT

## 2023-06-12 PROCEDURE — 0225U NFCT DS DNA&RNA 21 SARSCOV2: CPT

## 2023-06-12 PROCEDURE — 96374 THER/PROPH/DIAG INJ IV PUSH: CPT

## 2023-06-12 PROCEDURE — 99285 EMERGENCY DEPT VISIT HI MDM: CPT

## 2023-06-12 PROCEDURE — 83690 ASSAY OF LIPASE: CPT

## 2023-06-12 PROCEDURE — 81001 URINALYSIS AUTO W/SCOPE: CPT

## 2023-06-12 PROCEDURE — 71045 X-RAY EXAM CHEST 1 VIEW: CPT

## 2023-06-12 PROCEDURE — 84443 ASSAY THYROID STIM HORMONE: CPT

## 2023-06-12 RX ORDER — LACTULOSE 10 G/15ML
20 SOLUTION ORAL
Qty: 1 | Refills: 0
Start: 2023-06-12 | End: 2023-06-21

## 2023-06-12 RX ORDER — LACTULOSE 10 G/15ML
20 SOLUTION ORAL
Qty: 600 | Refills: 0
Start: 2023-06-12 | End: 2023-06-21

## 2023-06-12 RX ORDER — POLYETHYLENE GLYCOL 3350 17 G/17G
17 POWDER, FOR SOLUTION ORAL
Qty: 170 | Refills: 0
Start: 2023-06-12 | End: 2023-06-21

## 2023-06-12 RX ORDER — HYDROCORTISONE 1 %
1 OINTMENT (GRAM) TOPICAL
Qty: 30 | Refills: 0
Start: 2023-06-12 | End: 2023-07-11

## 2023-06-12 RX ORDER — SENNA PLUS 8.6 MG/1
2 TABLET ORAL
Qty: 60 | Refills: 0
Start: 2023-06-12 | End: 2023-07-11

## 2023-06-12 RX ORDER — FUROSEMIDE 40 MG
1 TABLET ORAL
Qty: 10 | Refills: 0
Start: 2023-06-12 | End: 2023-07-11

## 2023-06-12 RX ORDER — FUROSEMIDE 40 MG
1 TABLET ORAL
Qty: 0 | Refills: 0 | DISCHARGE

## 2023-06-12 RX ORDER — HYDROCORTISONE 1 %
1 OINTMENT (GRAM) TOPICAL
Qty: 2 | Refills: 0
Start: 2023-06-12 | End: 2023-07-11

## 2023-06-12 RX ORDER — POTASSIUM CHLORIDE 20 MEQ
1 PACKET (EA) ORAL
Qty: 0 | Refills: 0 | DISCHARGE

## 2023-06-12 RX ORDER — POLYETHYLENE GLYCOL 3350 17 G/17G
17 POWDER, FOR SOLUTION ORAL
Qty: 1020 | Refills: 0
Start: 2023-06-12 | End: 2023-07-11

## 2023-06-12 RX ORDER — ONDANSETRON 8 MG/1
1 TABLET, FILM COATED ORAL
Qty: 0 | Refills: 0 | DISCHARGE

## 2023-06-12 RX ADMIN — Medication 20 MILLIGRAM(S): at 11:27

## 2023-06-12 RX ADMIN — Medication 650 MILLIGRAM(S): at 12:27

## 2023-06-12 RX ADMIN — LACTULOSE 30 GRAM(S): 10 SOLUTION ORAL at 05:47

## 2023-06-12 RX ADMIN — Medication 650 MILLIGRAM(S): at 00:30

## 2023-06-12 RX ADMIN — POLYETHYLENE GLYCOL 3350 17 GRAM(S): 17 POWDER, FOR SOLUTION ORAL at 05:47

## 2023-06-12 RX ADMIN — Medication 650 MILLIGRAM(S): at 11:27

## 2023-06-12 RX ADMIN — ENOXAPARIN SODIUM 40 MILLIGRAM(S): 100 INJECTION SUBCUTANEOUS at 05:47

## 2023-06-12 RX ADMIN — Medication 100 MICROGRAM(S): at 05:47

## 2023-06-12 RX ADMIN — Medication 81 MILLIGRAM(S): at 11:27

## 2023-06-12 NOTE — DISCHARGE NOTE PROVIDER - ATTENDING DISCHARGE PHYSICAL EXAMINATION:
GENERAL: NAD, lying in bed   HEAD:  Normocephalic  EYES:  conjunctiva and sclera clear  ENT: Moist mucous membranes  NECK: Supple  CHEST/LUNG: Clear to auscultation bilaterally; No rales or rhonchi; no wheezing. Unlabored respirations  HEART: Regular rate and rhythm; S1S2+  ABDOMEN: Bowel sounds present; Soft, Nontender, Nondistended.   EXTREMITIES:  + distal Peripheral Pulses;  No cyanosis, or edema  NERVOUS SYSTEM:  Alert & Oriented X3;  No gross focal deficits   MSK: moves all extremities  SKIN: No rashes

## 2023-06-12 NOTE — PROGRESS NOTE ADULT - PROVIDER SPECIALTY LIST ADULT
Gastroenterology
Hospitalist
Infectious Disease
Infectious Disease
Gastroenterology
Hospitalist

## 2023-06-12 NOTE — DISCHARGE NOTE PROVIDER - HOSPITAL COURSE
ADMISSION DATE:  06-08-23    ---  FROM ADMISSION H+P:   HPI:  84yo female with PMH of CVA, hypothyroidism, IBS-C, prediabetes, chronic lower extremity swelling who presents to the ED with abdominal pain and constipation. Patient endorses a history of chronic constipation and is dependent on Linzess, stool softeners and lactulose which she must take daily to have a bowel movement. She normally has a liquid-consistent bowel movement daily. Her last bowel movement one week ago. She reports lower abdominal pain that began this morning, mostly over the right and left lower quadrants in a cramping fashion. She admits to some drops of bright red blood per rectum as she is straining to have a bowel movement. Admits one episode of vomiting this morning, yellow-fluid consistency. She called her GI doctor as she was concerned that she was impacted and he declined disimpacting the patient. She then tried taking Linzess, 2 stool softeners, 4 pills of Ex-lax, 1 tablespoon of lactulose and magnesium oxide and 2 suppositories today with no bowel movement so she came to the ED for further management. States that she had a colonoscopy at age 52 and never again after this - stating that she is unable to tolerate the prep. Refused SMOG enema on admission and had unsuccessful attempt of disimpaction by ER. Reporting 7/10 pain - has multiple allergies and willing to trial ofirmev for pain - knows opiates will constipate her further.    In the ED:  Vitals: Temp 97.8F | HR 96 | /95 | RR 18 | SpO2 96% on RA  Labs: WBC 15.12  CT abdomen/pelvis: large fecal load with stercoral proctitis, no obstruction (08 Jun 2023 01:45)      ---  HOSPITAL COURSE/PERTINENT LABS/PROCEDURES PERFORMED/PENDING TESTS:   Pt was admitted for constipation, seen By GI, laxatives and SOMG enema, with good BM out put and resolution of abd pain. had one episode of fever, RVP/GI PCR negative, Blood culture NTD. urine culture still pending. patient has since been afebrile, WBC normalized, ID consulted, monitored off antibiotic. patient requested to be discharged today, discussed with patient, if her culture come back positive, she will be notified and she may need to come back to hospital for treatment, patient agreed.     Patient is stable for discharge as per primary medical team and consultants.      Patient showed improvement throughout hospitalization. Patient was seen and examined on day of discharge. Patient was medically optimized for discharge with close outpatient follow up.    ---  PATIENT CONDITION:  - stable    --  VITALS:   T(C): 37.2 (06-12-23 @ 11:24), Max: 37.3 (06-11-23 @ 21:07)  HR: 80 (06-12-23 @ 11:24) (69 - 87)  BP: 166/76 (06-12-23 @ 11:24) (121/54 - 166/76)  RR: 18 (06-12-23 @ 11:24) (18 - 18)  SpO2: 92% (06-12-23 @ 11:24) (92% - 96%)

## 2023-06-12 NOTE — PROGRESS NOTE ADULT - SUBJECTIVE AND OBJECTIVE BOX
[facility]  Dayo Nath PA-C  [address] [place] [address]  [phone]      INTERVAL HPI/OVERNIGHT EVENTS:  Pt s/e  Reports she has had significant bowel movements and feels much improved  No further nausea  Tolerating diet    MEDICATIONS  (STANDING):  aspirin enteric coated 81 milliGRAM(s) Oral daily  enoxaparin Injectable 40 milliGRAM(s) SubCutaneous every 24 hours  hydrocortisone hemorrhoidal Suppository 1 Suppository(s) Rectal at bedtime  lactulose Syrup 30 Gram(s) Oral four times a day  levothyroxine 100 MICROGram(s) Oral daily  polyethylene glycol 3350 17 Gram(s) Oral two times a day  senna 2 Tablet(s) Oral at bedtime    MEDICATIONS  (PRN):  acetaminophen     Tablet .. 650 milliGRAM(s) Oral every 6 hours PRN Temp greater or equal to 38C (100.4F), Mild Pain (1 - 3)  melatonin 3 milliGRAM(s) Oral at bedtime PRN Insomnia  ondansetron Injectable 4 milliGRAM(s) IV Push every 8 hours PRN Nausea and/or Vomiting      Allergies    peanuts (Other)  allergic to food coloring (Unknown)  Originally Entered as [Rash] reaction to [oregano spice] (Unknown)  food coloring red/blue, wheat, tide, morphine, ceclor, compazine, advil, mortim, iv contrast, shellfish, oregano, tomoatoes, pork, peas, mold/spores (Unknown)  shellfish (Unknown)  morphine (Unknown)  cephalosporins (Other)  pork (Unknown)  Advil (Unknown)  Ceclor (Other)  ciprofloxacin (Other)  Motrin (Unknown)  Compazine (Other)    PHYSICAL EXAM:   Vital Signs:  Vital Signs Last 24 Hrs  T(C): 36.9 (10 Reese 2023 04:52), Max: 36.9 (10 Reese 2023 04:52)  T(F): 98.5 (10 Reese 2023 04:52), Max: 98.5 (10 Reese 2023 04:52)  HR: 75 (10 Reese 2023 04:52) (75 - 83)  BP: 119/68 (10 Reese 2023 04:52) (119/68 - 151/73)  BP(mean): --  RR: 18 (10 Reese 2023 04:52) (17 - 18)  SpO2: 92% (10 Reese 2023 04:52) (91% - 93%)    Parameters below as of 10 Reese 2023 04:52  Patient On (Oxygen Delivery Method): room air    GENERAL:  Appears stated age  HEENT:  NC/AT  CHEST:  Full & symmetric excursion  HEART:  Regular rhythm  ABDOMEN:  Soft, non-tender, non-distended  EXTEREMITIES:  no cyanosis  SKIN:  No rash  NEURO:  Alert  
[facility]  Dayo Nath PA-C  [address] [place] [address]  [phone]      INTERVAL HPI/OVERNIGHT EVENTS:  Pt s/e  overnight events noted; fevers  Reports abdominal pain resolved, +multiple BM  Tolerating diet  No GI complaints    MEDICATIONS  (STANDING):  aspirin enteric coated 81 milliGRAM(s) Oral daily  enoxaparin Injectable 40 milliGRAM(s) SubCutaneous every 24 hours  furosemide    Tablet 20 milliGRAM(s) Oral <User Schedule>  hydrocortisone hemorrhoidal Suppository 1 Suppository(s) Rectal at bedtime  lactulose Syrup 30 Gram(s) Oral four times a day  levothyroxine 100 MICROGram(s) Oral daily  polyethylene glycol 3350 17 Gram(s) Oral two times a day  senna 2 Tablet(s) Oral at bedtime    MEDICATIONS  (PRN):  acetaminophen     Tablet .. 650 milliGRAM(s) Oral every 6 hours PRN Temp greater or equal to 38C (100.4F), Mild Pain (1 - 3)  melatonin 3 milliGRAM(s) Oral at bedtime PRN Insomnia  ondansetron Injectable 4 milliGRAM(s) IV Push every 8 hours PRN Nausea and/or Vomiting      Allergies    peanuts (Other)  allergic to food coloring (Unknown)  Originally Entered as [Rash] reaction to [oregano spice] (Unknown)  food coloring red/blue, wheat, tide, morphine, ceclor, compazine, advil, mortim, iv contrast, shellfish, oregano, tomoatoes, pork, peas, mold/spores (Unknown)  shellfish (Unknown)  morphine (Unknown)  cephalosporins (Other)  pork (Unknown)  Advil (Unknown)  Ceclor (Other)  ciprofloxacin (Other)  Motrin (Unknown)  Compazine (Other)      PHYSICAL EXAM:   Vital Signs:  Vital Signs Last 24 Hrs  T(C): 36.7 (11 Jun 2023 04:52), Max: 39.2 (10 Reese 2023 12:35)  T(F): 98 (11 Jun 2023 04:52), Max: 102.5 (10 Reese 2023 12:35)  HR: 70 (11 Jun 2023 04:52) (70 - 83)  BP: 126/61 (11 Jun 2023 04:52) (109/77 - 128/76)  BP(mean): --  RR: 18 (11 Jun 2023 04:52) (18 - 18)  SpO2: 93% (11 Jun 2023 04:52) (92% - 93%)    Parameters below as of 11 Jun 2023 04:52  Patient On (Oxygen Delivery Method): room air    GENERAL:  Appears stated age  HEENT:  NC/AT  CHEST:  Full & symmetric excursion  HEART:  Regular rhythm  ABDOMEN:  Soft, non-tender, non-distended  EXTEREMITIES:  no cyanosis  SKIN:  No rash  NEURO:  Alert      LABS:                        11.6   11.57 )-----------( 275      ( 11 Jun 2023 08:15 )             35.9     06-11    136  |  100  |  15  ----------------------------<  96  4.1   |  30  |  0.63    Ca    9.0      11 Jun 2023 08:15    TPro  6.1  /  Alb  3.0<L>  /  TBili  0.6  /  DBili  x   /  AST  28  /  ALT  39  /  AlkPhos  112  06-10    
[facility], Division of Infectious Diseases  MIKE Lay Y. Patel, S. Shah, G. [facility]  [phone]    Name: MICHEL TRENT  Age: 83y  Gender: Female  MRN: [id number]    Interval History:  Patient seen and examined at bedside  No acute overnight events. Afebrile  No complaints, feeling better  Wants to go home tomorrow  Notes reviewed    Antibiotics:      Medications:  acetaminophen     Tablet .. 650 milliGRAM(s) Oral every 6 hours PRN  aspirin enteric coated 81 milliGRAM(s) Oral daily  enoxaparin Injectable 40 milliGRAM(s) SubCutaneous every 24 hours  furosemide    Tablet 20 milliGRAM(s) Oral <User Schedule>  hydrocortisone hemorrhoidal Suppository 1 Suppository(s) Rectal at bedtime  lactulose Syrup 30 Gram(s) Oral four times a day  levothyroxine 100 MICROGram(s) Oral daily  melatonin 3 milliGRAM(s) Oral at bedtime PRN  ondansetron Injectable 4 milliGRAM(s) IV Push every 8 hours PRN  polyethylene glycol 3350 17 Gram(s) Oral two times a day  senna 2 Tablet(s) Oral at bedtime      Review of Systems:  A 10-point review of systems was obtained.    Review of systems otherwise negative except as previously noted.    Allergies: peanuts (Other)  allergic to food coloring (Unknown)  Originally Entered as [Rash] reaction to [oregano spice] (Unknown)  food coloring red/blue, wheat, tide, morphine, ceclor, compazine, advil, mortim, iv contrast, shellfish, oregano, tomoatoes, pork, peas, mold/spores (Unknown)  shellfish (Unknown)  morphine (Unknown)  cephalosporins (Other)  pork (Unknown)  Advil (Unknown)  Ceclor (Other)  ciprofloxacin (Other)  Motrin (Unknown)  Compazine (Other)    For details regarding the patient's past medical history, social history, family history, and other miscellaneous elements, please refer the initial infectious diseases consultation and/or the admitting history and physical examination for this admission.    Objective:  Vitals:   T(C): 37.2 (23 @ 12:24), Max: 37.2 (23 @ 12:24)  HR: 94 (23 @ 12:24) (70 - 94)  BP: 132/70 (23 @ 12:24) (109/77 - 132/70)  RR: 18 (23 @ 12:24) (18 - 18)  SpO2: 92% (23 @ 12:24) (92% - 93%)    Physical Examination:  General: no acute distress  HEENT: NC/AT, EOMI,  Cardio: S1, S2 heard, RRR, no murmurs  Resp: decreased breath sounds  Abd: soft, NT, ND  Ext: no edema or cyanosis  Skin: warm, dry, no visible rash      Laboratory Studies:  CBC:                       11.6   11.57 )-----------( 275      ( 2023 08:15 )             35.9     CMP:     136  |  100  |  15  ----------------------------<  96  4.1   |  30  |  0.63    Ca    9.0      2023 08:15    TPro  6.1  /  Alb  3.0<L>  /  TBili  0.6  /  DBili  x   /  AST  28  /  ALT  39  /  AlkPhos  112  06-10    LIVER FUNCTIONS - ( 10 Reese 2023 13:45 )  Alb: 3.0 g/dL / Pro: 6.1 g/dL / ALK PHOS: 112 U/L / ALT: 39 U/L / AST: 28 U/L / GGT: x           Urinalysis Basic - ( 2023 13:00 )    Color: Yellow / Appearance: Cloudy / S.018 / pH: x  Gluc: x / Ketone: 15 mg/dL  / Bili: Negative / Urobili: 0.2 mg/dL   Blood: x / Protein: 30 mg/dL / Nitrite: Negative   Leuk Esterase: Moderate / RBC: 2 /HPF / WBC 10 /HPF   Sq Epi: x / Non Sq Epi: x / Bacteria: Few /HPF        Microbiology: reviewed        Radiology: reviewed      
[facility], Division of Infectious Diseases  MIKE Lay Y. Patel, S. Shah, G. [facility]  [phone]    Name: MICHEL TRENT  Age: 83y  Gender: Female  MRN: [id number]    Interval History:  Patient seen and examined at bedside this morning  No acute overnight events. Afebrile  No complaints  Wants to go home  Notes reviewed    Antibiotics:      Medications:  acetaminophen     Tablet .. 650 milliGRAM(s) Oral every 6 hours PRN  aspirin enteric coated 81 milliGRAM(s) Oral daily  enoxaparin Injectable 40 milliGRAM(s) SubCutaneous every 24 hours  furosemide    Tablet 20 milliGRAM(s) Oral <User Schedule>  hydrocortisone hemorrhoidal Suppository 1 Suppository(s) Rectal at bedtime  lactulose Syrup 30 Gram(s) Oral four times a day  levothyroxine 100 MICROGram(s) Oral daily  melatonin 3 milliGRAM(s) Oral at bedtime PRN  ondansetron Injectable 4 milliGRAM(s) IV Push every 8 hours PRN  polyethylene glycol 3350 17 Gram(s) Oral two times a day  senna 2 Tablet(s) Oral at bedtime      Review of Systems:  A 10-point review of systems was obtained.   Review of systems otherwise negative except as previously noted.    Allergies: peanuts (Other)  allergic to food coloring (Unknown)  Originally Entered as [Rash] reaction to [oregano spice] (Unknown)  food coloring red/blue, wheat, tide, morphine, ceclor, compazine, advil, mortim, iv contrast, shellfish, oregano, tomoatoes, pork, peas, mold/spores (Unknown)  shellfish (Unknown)  morphine (Unknown)  cephalosporins (Other)  pork (Unknown)  Advil (Unknown)  Ceclor (Other)  ciprofloxacin (Other)  Motrin (Unknown)  Compazine (Other)    For details regarding the patient's past medical history, social history, family history, and other miscellaneous elements, please refer the initial infectious diseases consultation and/or the admitting history and physical examination for this admission.    Objective:  Vitals:   T(C): 36.8 (23 @ 04:43), Max: 37.3 (23 @ 21:07)  HR: 69 (23 @ 04:43) (69 - 94)  BP: 121/54 (23 @ 04:43) (121/54 - 134/59)  RR: 18 (23 @ 04:43) (18 - 18)  SpO2: 96% (23 @ 04:43) (92% - 96%)    Physical Examination:  General: no acute distress  HEENT: NC/AT, EOMI,  Cardio: S1, S2 heard, RRR, no murmurs  Resp: breath sounds heard bilaterally, no rales, wheezes or rhonchi  Abd: soft, NT, ND  Ext: no edema or cyanosis  Skin: warm, dry, no visible rash      Laboratory Studies:  CBC:                       11.1   9.12  )-----------( 310      ( 2023 08:20 )             34.0     CMP:     139  |  102  |  14  ----------------------------<  114<H>  3.9   |  31  |  0.60    Ca    9.0      2023 08:20    TPro  6.1  /  Alb  3.0<L>  /  TBili  0.6  /  DBili  x   /  AST  28  /  ALT  39  /  AlkPhos  112  06-10    LIVER FUNCTIONS - ( 10 Reese 2023 13:45 )  Alb: 3.0 g/dL / Pro: 6.1 g/dL / ALK PHOS: 112 U/L / ALT: 39 U/L / AST: 28 U/L / GGT: x           Urinalysis Basic - ( 2023 13:00 )    Color: Yellow / Appearance: Cloudy / S.018 / pH: x  Gluc: x / Ketone: 15 mg/dL  / Bili: Negative / Urobili: 0.2 mg/dL   Blood: x / Protein: 30 mg/dL / Nitrite: Negative   Leuk Esterase: Moderate / RBC: 2 /HPF / WBC 10 /HPF   Sq Epi: x / Non Sq Epi: x / Bacteria: Few /HPF        Microbiology: reviewed    Culture - Blood (collected 06-10-23 @ 13:45)  Source: .Blood Blood-Venous  Preliminary Report (23 @ 21:01):    No growth to date.          Radiology: reviewed      
[facility]  Dayo Nath PA-C  [address] [place] [address]  [phone]      INTERVAL HPI/OVERNIGHT EVENTS:  Pt s/e  +BM after smog enemas  Pt still having abdominal pain and still feels constipated and nauseous  Otherwise denies GI complaints    MEDICATIONS  (STANDING):  aspirin enteric coated 81 milliGRAM(s) Oral daily  enoxaparin Injectable 40 milliGRAM(s) SubCutaneous every 24 hours  hydrocortisone hemorrhoidal Suppository 1 Suppository(s) Rectal at bedtime  lactulose Syrup 20 Gram(s) Oral three times a day  levothyroxine 100 MICROGram(s) Oral daily  polyethylene glycol 3350 17 Gram(s) Oral two times a day  senna 2 Tablet(s) Oral at bedtime  sorbitol 70%/mineral oil/magnesium hydroxide/glycerin Enema 120 milliLiter(s) Rectal every 12 hours    MEDICATIONS  (PRN):  acetaminophen     Tablet .. 650 milliGRAM(s) Oral every 6 hours PRN Temp greater or equal to 38C (100.4F), Mild Pain (1 - 3)  melatonin 3 milliGRAM(s) Oral at bedtime PRN Insomnia  ondansetron Injectable 4 milliGRAM(s) IV Push every 8 hours PRN Nausea and/or Vomiting      Allergies    peanuts (Other)  allergic to food coloring (Unknown)  Originally Entered as [Rash] reaction to [oregano spice] (Unknown)  food coloring red/blue, wheat, tide, morphine, ceclor, compazine, advil, mortim, iv contrast, shellfish, oregano, tomoatoes, pork, peas, mold/spores (Unknown)  shellfish (Unknown)  morphine (Unknown)  cephalosporins (Other)  pork (Unknown)  Advil (Unknown)  Ceclor (Other)  ciprofloxacin (Other)  Motrin (Unknown)  Compazine (Other)        PHYSICAL EXAM:   Vital Signs:  Vital Signs Last 24 Hrs  T(C): 37.1 (09 Jun 2023 05:03), Max: 37.5 (08 Jun 2023 21:03)  T(F): 98.8 (09 Jun 2023 05:03), Max: 99.5 (08 Jun 2023 21:03)  HR: 86 (09 Jun 2023 05:03) (77 - 86)  BP: 127/65 (09 Jun 2023 05:03) (112/69 - 127/65)  BP(mean): --  RR: 16 (09 Jun 2023 05:03) (16 - 16)  SpO2: 92% (09 Jun 2023 05:03) (92% - 92%)    Parameters below as of 09 Jun 2023 05:03  Patient On (Oxygen Delivery Method): room air      GENERAL:  Appears stated age  HEENT:  NC/AT  CHEST:  Full & symmetric excursion  HEART:  Regular rhythm  ABDOMEN:  Soft, non-tender, non-distended  EXTEREMITIES:  no cyanosis  SKIN:  No rash  NEURO:  Alert      LABS:                        12.1   11.07 )-----------( 237      ( 08 Jun 2023 04:28 )             37.6     06-08    141  |  111<H>  |  14  ----------------------------<  89  4.1   |  25  |  0.51    Ca    8.5      08 Jun 2023 04:28    TPro  5.7<L>  /  Alb  3.1<L>  /  TBili  0.5  /  DBili  x   /  AST  28  /  ALT  45  /  AlkPhos  94  06-08  
[facility]  Dayo Nath PA-C  [address] [place] [address]  [phone]      INTERVAL HPI/OVERNIGHT EVENTS:  Pt s/e  Reports feeling well, +BMs and no abdominal pain    MEDICATIONS  (STANDING):  aspirin enteric coated 81 milliGRAM(s) Oral daily  enoxaparin Injectable 40 milliGRAM(s) SubCutaneous every 24 hours  furosemide    Tablet 20 milliGRAM(s) Oral <User Schedule>  hydrocortisone hemorrhoidal Suppository 1 Suppository(s) Rectal at bedtime  lactulose Syrup 30 Gram(s) Oral four times a day  levothyroxine 100 MICROGram(s) Oral daily  polyethylene glycol 3350 17 Gram(s) Oral two times a day  senna 2 Tablet(s) Oral at bedtime    MEDICATIONS  (PRN):  acetaminophen     Tablet .. 650 milliGRAM(s) Oral every 6 hours PRN Temp greater or equal to 38C (100.4F), Mild Pain (1 - 3)  melatonin 3 milliGRAM(s) Oral at bedtime PRN Insomnia  ondansetron Injectable 4 milliGRAM(s) IV Push every 8 hours PRN Nausea and/or Vomiting      Allergies    peanuts (Other)  allergic to food coloring (Unknown)  Originally Entered as [Rash] reaction to [oregano spice] (Unknown)  food coloring red/blue, wheat, tide, morphine, ceclor, compazine, advil, mortim, iv contrast, shellfish, oregano, tomoatoes, pork, peas, mold/spores (Unknown)  shellfish (Unknown)  morphine (Unknown)  cephalosporins (Other)  pork (Unknown)  Advil (Unknown)  Ceclor (Other)  ciprofloxacin (Other)  Motrin (Unknown)  Compazine (Other)      PHYSICAL EXAM:   Vital Signs:  Vital Signs Last 24 Hrs  T(C): 37.2 (2023 11:24), Max: 37.3 (2023 21:07)  T(F): 98.9 (2023 11:24), Max: 99.1 (2023 21:07)  HR: 80 (2023 11:24) (69 - 87)  BP: 166/76 (2023 11:24) (121/54 - 166/76)  BP(mean): --  RR: 18 (2023 11:24) (18 - 18)  SpO2: 92% (2023 11:24) (92% - 96%)    Parameters below as of 2023 11:24  Patient On (Oxygen Delivery Method): room air      GENERAL:  Appears stated age  HEENT:  NC/AT  CHEST:  Full & symmetric excursion  HEART:  Regular rhythm  ABDOMEN:  Soft, non-tender, non-distended  EXTEREMITIES:  no cyanosis  SKIN:  No rash  NEURO:  Alert      LABS:                        11.1   9.12  )-----------( 310      ( 2023 08:20 )             34.0     06-12    139  |  102  |  14  ----------------------------<  114<H>  3.9   |  31  |  0.60    Ca    9.0      2023 08:20    TPro  6.1  /  Alb  3.0<L>  /  TBili  0.6  /  DBili  x   /  AST  28  /  ALT  39  /  AlkPhos  112  06-10      Urinalysis Basic - ( 2023 13:00 )    Color: Yellow / Appearance: Cloudy / S.018 / pH: x  Gluc: x / Ketone: 15 mg/dL  / Bili: Negative / Urobili: 0.2 mg/dL   Blood: x / Protein: 30 mg/dL / Nitrite: Negative   Leuk Esterase: Moderate / RBC: 2 /HPF / WBC 10 /HPF   Sq Epi: x / Non Sq Epi: x / Bacteria: Few /HPF  
Patient is a 83y old  Female who presents with a chief complaint of Constipation, stercoral proctitis (10 Reese 2023 10:59)      Subjective:  INTERVAL HPI/OVERNIGHT EVENTS: Patient seen and examined at bedside.  Patient states she had no abd pain today, now multiple BM after enema and loose stool.    MEDICATIONS  (STANDING):  aspirin enteric coated 81 milliGRAM(s) Oral daily  enoxaparin Injectable 40 milliGRAM(s) SubCutaneous every 24 hours  hydrocortisone hemorrhoidal Suppository 1 Suppository(s) Rectal at bedtime  lactulose Syrup 30 Gram(s) Oral four times a day  levothyroxine 100 MICROGram(s) Oral daily  polyethylene glycol 3350 17 Gram(s) Oral two times a day  senna 2 Tablet(s) Oral at bedtime    MEDICATIONS  (PRN):  acetaminophen     Tablet .. 650 milliGRAM(s) Oral every 6 hours PRN Temp greater or equal to 38C (100.4F), Mild Pain (1 - 3)  melatonin 3 milliGRAM(s) Oral at bedtime PRN Insomnia  ondansetron Injectable 4 milliGRAM(s) IV Push every 8 hours PRN Nausea and/or Vomiting      Allergies    peanuts (Other)  allergic to food coloring (Unknown)  Originally Entered as [Rash] reaction to [oregano spice] (Unknown)  food coloring red/blue, wheat, tide, morphine, ceclor, compazine, advil, mortim, iv contrast, shellfish, oregano, tomoatoes, pork, peas, mold/spores (Unknown)  shellfish (Unknown)  morphine (Unknown)  cephalosporins (Other)  pork (Unknown)  Advil (Unknown)  Ceclor (Other)  ciprofloxacin (Other)  Motrin (Unknown)  Compazine (Other)    Intolerances        REVIEW OF SYSTEMS:  CONSTITUTIONAL: + fever   HEENT:  No headache, no sore throat  RESPIRATORY: No cough or shortness of breath  CARDIOVASCULAR: No chest pain or palpitations  GASTROINTESTINAL: No abd pain, nausea, vomiting, or diarrhea      Objective:  Vital Signs Last 24 Hrs  T(C): 37.7 (10 Reese 2023 14:00), Max: 39.2 (10 Reese 2023 12:35)  T(F): 99.9 (10 Reese 2023 14:00), Max: 102.5 (10 Reese 2023 12:35)  HR: 83 (10 Reese 2023 12:30) (75 - 83)  BP: 128/76 (10 Reese 2023 12:30) (119/68 - 135/72)  BP(mean): --  RR: 18 (10 Reese 2023 12:30) (17 - 18)  SpO2: 92% (10 Reese 2023 12:30) (91% - 92%)    Parameters below as of 10 Reese 2023 12:30  Patient On (Oxygen Delivery Method): room air        GENERAL: NAD, lying in bed comfortably  HEAD:  Normocephalic  EYES:  conjunctiva and sclera clear  ENT: Moist mucous membranes  NECK: Supple  CHEST/LUNG: Clear to auscultation bilaterally; No rales or rhonchi; no wheezing. Unlabored respirations  HEART: Regular rate and rhythm; S1S2+  ABDOMEN: Bowel sounds present; Soft, Nontender, Nondistended.   EXTREMITIES:  + distal Peripheral Pulses;  No cyanosis, mild B/L ankle edema, varicose veins   NERVOUS SYSTEM:  Alert & Oriented X3;  No gross focal deficits   MSK: moves all extremities  SKIN: No rashes     LABS:                        11.4   12.50 )-----------( 258      ( 10 Reese 2023 13:45 )             34.9     10 Reese 2023 13:45    133    |  99     |  13     ----------------------------<  187    3.8     |  28     |  0.76     Ca    8.5        10 Reese 2023 13:45    TPro  x      /  Alb  3.0    /  TBili  x      /  DBili  x      /  AST  28     /  ALT  39     /  AlkPhos  x      10 Reese 2023 13:45        CAPILLARY BLOOD GLUCOSE              RADIOLOGY & ADDITIONAL TESTS:    Personally reviewed.     Consultant(s) Notes Reviewed:  [x] YES  [ ] NO    Plan of care discussed with patient; all questions answered  
Patient is a 83y old  Female who presents with a chief complaint of Constipation, stercoral proctitis (10 Reese 2023 17:01)      Subjective:  INTERVAL HPI/OVERNIGHT EVENTS: Patient seen and examined at bedside.  Patient has no complaints at this time. , has BM today and yesterday, no n/v/cp/sob/abd pain /f/chills   MEDICATIONS  (STANDING):  aspirin enteric coated 81 milliGRAM(s) Oral daily  enoxaparin Injectable 40 milliGRAM(s) SubCutaneous every 24 hours  furosemide    Tablet 20 milliGRAM(s) Oral <User Schedule>  hydrocortisone hemorrhoidal Suppository 1 Suppository(s) Rectal at bedtime  lactulose Syrup 30 Gram(s) Oral four times a day  levothyroxine 100 MICROGram(s) Oral daily  polyethylene glycol 3350 17 Gram(s) Oral two times a day  senna 2 Tablet(s) Oral at bedtime    MEDICATIONS  (PRN):  acetaminophen     Tablet .. 650 milliGRAM(s) Oral every 6 hours PRN Temp greater or equal to 38C (100.4F), Mild Pain (1 - 3)  melatonin 3 milliGRAM(s) Oral at bedtime PRN Insomnia  ondansetron Injectable 4 milliGRAM(s) IV Push every 8 hours PRN Nausea and/or Vomiting      Allergies    peanuts (Other)  allergic to food coloring (Unknown)  Originally Entered as [Rash] reaction to [oregano spice] (Unknown)  food coloring red/blue, wheat, tide, morphine, ceclor, compazine, advil, mortim, iv contrast, shellfish, oregano, tomoatoes, pork, peas, mold/spores (Unknown)  shellfish (Unknown)  morphine (Unknown)  cephalosporins (Other)  pork (Unknown)  Advil (Unknown)  Ceclor (Other)  ciprofloxacin (Other)  Motrin (Unknown)  Compazine (Other)    Intolerances        REVIEW OF SYSTEMS:  CONSTITUTIONAL: No fever or chills  HEENT:  No headache, no sore throat  RESPIRATORY: No cough or shortness of breath  CARDIOVASCULAR: No chest pain or palpitations  GASTROINTESTINAL: No abd pain, nausea, vomiting, or diarrhea      Objective:  Vital Signs Last 24 Hrs  T(C): 36.7 (11 Jun 2023 04:52), Max: 39.2 (10 Reese 2023 12:35)  T(F): 98 (11 Jun 2023 04:52), Max: 102.5 (10 Reese 2023 12:35)  HR: 70 (11 Jun 2023 04:52) (70 - 83)  BP: 126/61 (11 Jun 2023 04:52) (109/77 - 128/76)  BP(mean): --  RR: 18 (11 Jun 2023 04:52) (18 - 18)  SpO2: 93% (11 Jun 2023 04:52) (92% - 93%)    Parameters below as of 11 Jun 2023 04:52  Patient On (Oxygen Delivery Method): room air        GENERAL: NAD, lying in bed   HEAD:  Normocephalic  EYES:  conjunctiva and sclera clear  ENT: Moist mucous membranes  NECK: Supple  CHEST/LUNG: Clear to auscultation bilaterally; No rales or rhonchi; no wheezing. Unlabored respirations  HEART: Regular rate and rhythm; S1S2+  ABDOMEN: Bowel sounds present; Soft, Nontender, Nondistended.   EXTREMITIES:  + distal Peripheral Pulses;  No cyanosis, or edema  NERVOUS SYSTEM:  Alert & Oriented X3;  No gross focal deficits   MSK: moves all extremities  SKIN: No rashes     LABS:                        11.6   11.57 )-----------( 275      ( 11 Jun 2023 08:15 )             35.9     11 Jun 2023 08:15    136    |  100    |  15     ----------------------------<  96     4.1     |  30     |  0.63     Ca    9.0        11 Jun 2023 08:15    TPro  6.1    /  Alb  3.0    /  TBili  0.6    /  DBili  x      /  AST  28     /  ALT  39     /  AlkPhos  112    10 Reese 2023 13:45        CAPILLARY BLOOD GLUCOSE              RADIOLOGY & ADDITIONAL TESTS:    Personally reviewed.     Consultant(s) Notes Reviewed:  [x] YES  [ ] NO    Plan of care discussed with patient ; all questions answered  
Patient is a 83y old  Female who presents with a chief complaint of Constipation, stercoral proctitis (09 Jun 2023 11:44)      Subjective:  INTERVAL HPI/OVERNIGHT EVENTS: Patient seen and examined at bedside.  Patient had BM today, still has some abd pain, no n/v     MEDICATIONS  (STANDING):  aspirin enteric coated 81 milliGRAM(s) Oral daily  enoxaparin Injectable 40 milliGRAM(s) SubCutaneous every 24 hours  hydrocortisone hemorrhoidal Suppository 1 Suppository(s) Rectal at bedtime  lactulose Syrup 30 Gram(s) Oral four times a day  levothyroxine 100 MICROGram(s) Oral daily  polyethylene glycol 3350 17 Gram(s) Oral two times a day  senna 2 Tablet(s) Oral at bedtime  sorbitol 70%/mineral oil/magnesium hydroxide/glycerin Enema 120 milliLiter(s) Rectal every 12 hours    MEDICATIONS  (PRN):  acetaminophen     Tablet .. 650 milliGRAM(s) Oral every 6 hours PRN Temp greater or equal to 38C (100.4F), Mild Pain (1 - 3)  melatonin 3 milliGRAM(s) Oral at bedtime PRN Insomnia  ondansetron Injectable 4 milliGRAM(s) IV Push every 8 hours PRN Nausea and/or Vomiting      Allergies    peanuts (Other)  allergic to food coloring (Unknown)  Originally Entered as [Rash] reaction to [oregano spice] (Unknown)  food coloring red/blue, wheat, tide, morphine, ceclor, compazine, advil, mortim, iv contrast, shellfish, oregano, tomoatoes, pork, peas, mold/spores (Unknown)  shellfish (Unknown)  morphine (Unknown)  cephalosporins (Other)  pork (Unknown)  Advil (Unknown)  Ceclor (Other)  ciprofloxacin (Other)  Motrin (Unknown)  Compazine (Other)    Intolerances        REVIEW OF SYSTEMS:  CONSTITUTIONAL: No fever or chills  HEENT:  No headache, no sore throat  RESPIRATORY: No cough or shortness of breath  CARDIOVASCULAR: No chest pain or palpitations  GASTROINTESTINAL: + abd pain, nausea      Objective:  Vital Signs Last 24 Hrs  T(C): 36.7 (09 Jun 2023 12:36), Max: 37.5 (08 Jun 2023 21:03)  T(F): 98.1 (09 Jun 2023 12:36), Max: 99.5 (08 Jun 2023 21:03)  HR: 83 (09 Jun 2023 12:36) (77 - 86)  BP: 151/73 (09 Jun 2023 12:36) (112/69 - 151/73)  BP(mean): --  RR: 17 (09 Jun 2023 12:36) (16 - 17)  SpO2: 93% (09 Jun 2023 12:36) (92% - 93%)    Parameters below as of 09 Jun 2023 12:36  Patient On (Oxygen Delivery Method): room air        GENERAL: NAD, lying in bed   HEAD:  Normocephalic  EYES:  conjunctiva and sclera clear  ENT: Moist mucous membranes  NECK: Supple  CHEST/LUNG: Clear to auscultation bilaterally; No rales or rhonchi; no wheezing. Unlabored respirations  HEART: Regular rate and rhythm; S1S2+  ABDOMEN: Bowel sounds present; Soft, Nontender, Nondistended.   EXTREMITIES:  + distal Peripheral Pulses;  No cyanosis, or edema  NERVOUS SYSTEM:  Alert & Oriented X3;  No gross focal deficits   MSK: moves all extremities  SKIN: No rashes     LABS:      Ca    8.5        08 Jun 2023 04:28          CAPILLARY BLOOD GLUCOSE              RADIOLOGY & ADDITIONAL TESTS:    Personally reviewed.     Consultant(s) Notes Reviewed:  [x] YES  [ ] NO    Plan of care discussed with patient; all questions answered  
Patient is a 83y old  Female who presents with a chief complaint of Constipation, stercoral proctitis (08 Jun 2023 01:45)      Subjective:  INTERVAL HPI/OVERNIGHT EVENTS: Patient seen and examined at bedside.  Patient c/o abd pain   MEDICATIONS  (STANDING):  aspirin enteric coated 81 milliGRAM(s) Oral daily  enoxaparin Injectable 40 milliGRAM(s) SubCutaneous every 24 hours  lactulose Syrup 20 Gram(s) Oral three times a day  levothyroxine 100 MICROGram(s) Oral daily  polyethylene glycol 3350 17 Gram(s) Oral two times a day  senna 2 Tablet(s) Oral at bedtime    MEDICATIONS  (PRN):  acetaminophen     Tablet .. 650 milliGRAM(s) Oral every 6 hours PRN Temp greater or equal to 38C (100.4F), Mild Pain (1 - 3)  melatonin 3 milliGRAM(s) Oral at bedtime PRN Insomnia  ondansetron Injectable 4 milliGRAM(s) IV Push every 8 hours PRN Nausea and/or Vomiting      Allergies    peanuts (Other)  allergic to food coloring (Unknown)  Originally Entered as [Rash] reaction to [oregano spice] (Unknown)  food coloring red/blue, wheat, tide, morphine, ceclor, compazine, advil, mortim, iv contrast, shellfish, oregano, tomoatoes, pork, peas, mold/spores (Unknown)  shellfish (Unknown)  morphine (Unknown)  cephalosporins (Other)  pork (Unknown)  Advil (Unknown)  Ceclor (Other)  ciprofloxacin (Other)  Motrin (Unknown)  Compazine (Other)    Intolerances        REVIEW OF SYSTEMS:  CONSTITUTIONAL: No fever or chills  HEENT:  No headache, no sore throat  RESPIRATORY: No cough or shortness of breath  CARDIOVASCULAR: No chest pain or palpitations  GASTROINTESTINAL: + abd pain       Objective:  Vital Signs Last 24 Hrs  T(C): 36.9 (08 Jun 2023 10:52), Max: 37.3 (08 Jun 2023 06:30)  T(F): 98.4 (08 Jun 2023 10:52), Max: 99.1 (08 Jun 2023 06:30)  HR: 86 (08 Jun 2023 10:52) (80 - 96)  BP: 115/70 (08 Jun 2023 10:52) (103/66 - 168/85)  BP(mean): --  RR: 15 (08 Jun 2023 10:52) (15 - 18)  SpO2: 95% (08 Jun 2023 10:52) (93% - 96%)    Parameters below as of 08 Jun 2023 10:52  Patient On (Oxygen Delivery Method): room air        GENERAL: NAD, lying in bed   HEAD:  Normocephalic  EYES:  conjunctiva and sclera clear  ENT: Moist mucous membranes  NECK: Supple  CHEST/LUNG: Clear to auscultation bilaterally; No rales or rhonchi; no wheezing. Unlabored respirations  HEART: Regular rate and rhythm; S1S2+  ABDOMEN: Bowel sounds present; Soft, Nondistended. B/L LQ tenderness without rebound   EXTREMITIES:  + distal Peripheral Pulses;  No cyanosis, or edema  NERVOUS SYSTEM:  Alert & Oriented X3;  No gross focal deficits   MSK: moves all extremities  SKIN: No rashes     LABS:                        12.1   11.07 )-----------( 237      ( 08 Jun 2023 04:28 )             37.6     08 Jun 2023 04:28    141    |  111    |  14     ----------------------------<  89     4.1     |  25     |  0.51     Ca    8.5        08 Jun 2023 04:28    TPro  5.7    /  Alb  3.1    /  TBili  0.5    /  DBili  x      /  AST  28     /  ALT  45     /  AlkPhos  94     08 Jun 2023 04:28        CAPILLARY BLOOD GLUCOSE              RADIOLOGY & ADDITIONAL TESTS:    Personally reviewed.     Consultant(s) Notes Reviewed:  [x] YES  [ ] NO    Plan of care discussed with patient; all questions answered

## 2023-06-12 NOTE — DISCHARGE NOTE PROVIDER - NSDCCPCAREPLAN_GEN_ALL_CORE_FT
PRINCIPAL DISCHARGE DIAGNOSIS  Diagnosis: Constipation  Assessment and Plan of Treatment: you were seen by GI , laxatives , enema used with good result.      SECONDARY DISCHARGE DIAGNOSES  Diagnosis: Fever  Assessment and Plan of Treatment: you had one time fever at hospital, blood culture and urine culture were sent, you will be notified if culture become postive and further management will be discussed

## 2023-06-12 NOTE — PROGRESS NOTE ADULT - REASON FOR ADMISSION
Constipation, stercoral proctitis

## 2023-06-12 NOTE — DISCHARGE NOTE PROVIDER - NSDCMRMEDTOKEN_GEN_ALL_CORE_FT
amoxicillin-clavulanate 500 mg-125 mg oral tablet: 1 tab(s) orally once a day  Aspirin Low Dose 81 mg oral delayed release tablet: 1 tab(s) orally once a day, START 10/1/21  hydrocortisone 25 mg rectal suppository: 1 suppository(ies) rectal once a day (at bedtime)  lactulose 10 g/15 mL oral syrup: 20 gram(s) orally 3 times a day  Lasix 40 mg oral tablet: 1 tab(s) orally every 72 hours  Linzess 290 mcg oral capsule: 1 cap(s) orally once a day  meloxicam 15 mg oral tablet: 1 tab(s) orally once a day  metFORMIN 500 mg oral tablet, extended release: 1 tab(s) orally 2 times a day  multivitamin: 1 tab(s) orally once a day  polyethylene glycol 3350 oral powder for reconstitution: 17 gram(s) orally 2 times a day as needed for  constipation  senna leaf extract oral tablet: 2 tab(s) orally once a day (at bedtime)  Synthroid 100 mcg (0.1 mg) oral tablet: 1 tab(s) orally once a day   amoxicillin-clavulanate 500 mg-125 mg oral tablet: 1 tab(s) orally once a day  Aspirin Low Dose 81 mg oral delayed release tablet: 1 tab(s) orally once a day, START 10/1/21  hydrocortisone 25 mg rectal suppository: 1 suppository(ies) rectal once a day (at bedtime)  Lasix 40 mg oral tablet: 1 tab(s) orally every 72 hours  Linzess 290 mcg oral capsule: 1 cap(s) orally once a day  meloxicam 15 mg oral tablet: 1 tab(s) orally once a day  metFORMIN 500 mg oral tablet, extended release: 1 tab(s) orally 2 times a day  multivitamin: 1 tab(s) orally once a day  polyethylene glycol 3350 oral powder for reconstitution: 17 gram(s) orally 2 times a day as needed for  constipation  senna leaf extract oral tablet: 2 tab(s) orally once a day (at bedtime)  Synthroid 100 mcg (0.1 mg) oral tablet: 1 tab(s) orally once a day

## 2023-06-12 NOTE — PROGRESS NOTE ADULT - ASSESSMENT
82yo female with PMH of hypothyroidism, IBS-constipation predominant who presents to the ED with abdominal pain and constipation, admitted with constipation and stercoral proctitis.
Constipation  Fecal impaction  Stercoral colitis    +BMs however pt still feels constipated  Cont smog enemas as ordered  Cont pt's home bowel regimen  Anti-emetics prn  Avoid narcotics to prevent worsening constipation  D/w pt    I reviewed the overnight course of events on the unit, re-confirming the patient history. I discussed the care with the patient and their family  Differential diagnosis and plan of care discussed with patient after the evaluation  40 minutes spent on total encounter of which more than fifty percent of the encounter was spent counseling and/or coordinating care by the attending physician.  Advanced care planning was discussed with patient and family.  Advanced care planning forms were reviewed and discussed.  Risks, benefits and alternatives of gastroenterologic procedures were discussed in detail and all questions were answered.  
Constipation  Fecal impaction  Stercoral colitis    +multiple BMs  clinically improved  cont home bowel regimen  reg diet  can follow with her outpatient GI Dr Taylor upon d/c  D/w pt    I reviewed the overnight course of events on the unit, re-confirming the patient history. I discussed the care with the patient and their family  Differential diagnosis and plan of care discussed with patient after the evaluation  40 minutes spent on total encounter of which more than fifty percent of the encounter was spent counseling and/or coordinating care by the attending physician.  Advanced care planning was discussed with patient and family.  Advanced care planning forms were reviewed and discussed.  Risks, benefits and alternatives of gastroenterologic procedures were discussed in detail and all questions were answered.
Constipation  Fecal impaction  Stercoral colitis    +multiple BMs  clinically improved  cont home bowel regimen  reg diet  no GI objection to d/c planning  can follow with her outpatient GI Dr Taylor  D/w pt    I reviewed the overnight course of events on the unit, re-confirming the patient history. I discussed the care with the patient and their family  Differential diagnosis and plan of care discussed with patient after the evaluation  40 minutes spent on total encounter of which more than fifty percent of the encounter was spent counseling and/or coordinating care by the attending physician.  Advanced care planning was discussed with patient and family.  Advanced care planning forms were reviewed and discussed.  Risks, benefits and alternatives of gastroenterologic procedures were discussed in detail and all questions were answered.
84yo female with PMH of hypothyroidism, IBS-constipation predominant who presents to the ED with abdominal pain and constipation, admitted with constipation and stercoral proctitis.    Constipation/Stercoral Procitis  Fever on HD#2, leukocytosis  - imaging CT abdomen/pelvis: large fecal load with stercoral proctitis, no obstruction  - s/p unsuccessful manual disimpaction in ED; now w/ BM   - 6/10 febrile to 102.5F rectally  - RVP negative  - GI PCR negative  - BCx NGTD  Recommendations:   Pt w/o focal sx. Denies urinary sx, URI sx.   Denies n/v, has some diarrhea now in setting of medication  Suspect fever reactive, now resolved  Can monitor off Abx at this time    Stable from ID standpoint  D/c planning per primary team    D/w Dr. Nath  Infectious Diseases will continue to follow. Please call with any questions.   Sasha Ness M.D.  [facility] Division of Infectious Diseases [phone]  
82yo female with PMH of hypothyroidism, IBS-constipation predominant who presents to the ED with abdominal pain and constipation, admitted with constipation and stercoral proctitis.    Constipation/Stercoral Procitis  Fever on HD#2, leukocytosis  - imaging CT abdomen/pelvis: large fecal load with stercoral proctitis, no obstruction  - s/p unsuccessful manual disimpaction in ED; now w/ BM   - 6/10 febrile to 102.5F rectally  - RVP negative  - GI PCR negative  Recommendations:   Pt w/o focal sx. Denies urinary sx, URI sx.   Denies n/v, has some diarrhea now in setting of medication  Suspect fever reactive, now resolved  F/u pending cx  Can monitor off Abx at this time    If all w/u remains negative, no objection to d/c planning per primary team    D/w Dr. Nath    Infectious Diseases will continue to follow. Please call with any questions.   Sasha Ness M.D.  [facility] [phone]  
84yo female with PMH of hypothyroidism, IBS-constipation predominant who presents to the ED with abdominal pain and constipation, admitted with constipation and stercoral proctitis.
Constipation  Fecal impaction  Stercoral colitis    +multiple BMs  clinically improved  cont home bowel regimen  reg diet  can follow with her outpatient GI Dr Taylor upon d/c  D/w pt    I reviewed the overnight course of events on the unit, re-confirming the patient history. I discussed the care with the patient and their family  Differential diagnosis and plan of care discussed with patient after the evaluation  40 minutes spent on total encounter of which more than fifty percent of the encounter was spent counseling and/or coordinating care by the attending physician.  Advanced care planning was discussed with patient and family.  Advanced care planning forms were reviewed and discussed.  Risks, benefits and alternatives of gastroenterologic procedures were discussed in detail and all questions were answered.  
82yo female with PMH of hypothyroidism, IBS-constipation predominant who presents to the ED with abdominal pain and constipation, admitted with constipation and stercoral proctitis.
82yo female with PMH of hypothyroidism, IBS-constipation predominant who presents to the ED with abdominal pain and constipation, admitted with constipation and stercoral proctitis.

## 2023-06-12 NOTE — DISCHARGE NOTE NURSING/CASE MANAGEMENT/SOCIAL WORK - PATIENT PORTAL LINK FT
You can access the FollowMyHealth Patient Portal offered by [facility] by registering at the following website: [email or web address]. By joining [facility]’s FollowMyHealth portal, you will also be able to view your health information using other applications (apps) compatible with our system.

## 2023-06-13 LAB
CULTURE RESULTS: SIGNIFICANT CHANGE UP
SPECIMEN SOURCE: SIGNIFICANT CHANGE UP

## 2023-06-15 LAB
CULTURE RESULTS: SIGNIFICANT CHANGE UP
SPECIMEN SOURCE: SIGNIFICANT CHANGE UP

## 2023-08-09 NOTE — CHART NOTE - NSCHARTNOTEFT_GEN_A_CORE
Patient will need home oxygen to increase pre-hospital capacity for COVID-19  ICD 10 CODE U07.1 COVID-19 Patient/Caregiver provided printed discharge information.

## 2023-10-05 NOTE — ED ADULT NURSE NOTE - CAS EDN DISCHARGE ASSESSMENT
H&P reviewed. The patient was examined and there are no changes to the H&P.  
Alert and oriented to person, place and time

## 2023-11-27 NOTE — PATIENT PROFILE ADULT. - PATIENT REPRESENTATIVE: ( YOU CAN CHOOSE ANY PERSON THAT CAN ASSIST YOU WITH YOUR HEALTH CARE PREFERENCES, DOES NOT HAVE TO BE A SPOUSE, IMMEDIATE FAMILY OR SIGNIFICANT OTHER/PARTNER)
Noted. Will make Dr. Isiah Mao aware so she can discuss with patient at his upcoming appointment on 11/29 Yes

## 2024-02-06 NOTE — ED ADULT NURSE NOTE - GASTROINTESTINAL WDL
Yes - the patient is able to be screened
Abdomen soft, nontender, nondistended, bowel sounds present in all 4 quadrants.

## 2025-01-05 NOTE — PHYSICAL THERAPY INITIAL EVALUATION ADULT - GAIT DEVIATIONS NOTED, PT EVAL
Universal Safety Interventions
decreased milton/decreased step length/decreased stride length/decreased weight-shifting ability